# Patient Record
Sex: FEMALE | Race: WHITE | Employment: OTHER | URBAN - METROPOLITAN AREA
[De-identification: names, ages, dates, MRNs, and addresses within clinical notes are randomized per-mention and may not be internally consistent; named-entity substitution may affect disease eponyms.]

---

## 2018-06-15 LAB — DIABETIC RETINOPATHY: NEGATIVE

## 2020-04-05 ENCOUNTER — APPOINTMENT (OUTPATIENT)
Dept: CT IMAGING | Age: 75
End: 2020-04-05
Payer: MEDICARE

## 2020-04-05 ENCOUNTER — HOSPITAL ENCOUNTER (EMERGENCY)
Age: 75
Discharge: HOME | End: 2020-04-05
Attending: EMERGENCY MEDICINE
Payer: MEDICARE

## 2020-04-05 ENCOUNTER — APPOINTMENT (OUTPATIENT)
Dept: GENERAL RADIOLOGY | Age: 75
End: 2020-04-05
Payer: MEDICARE

## 2020-04-05 ENCOUNTER — APPOINTMENT (OUTPATIENT)
Dept: ULTRASOUND IMAGING | Age: 75
End: 2020-04-05
Payer: MEDICARE

## 2020-04-05 VITALS
HEART RATE: 78 BPM | TEMPERATURE: 97.9 F | BODY MASS INDEX: 27.6 KG/M2 | DIASTOLIC BLOOD PRESSURE: 75 MMHG | HEIGHT: 62 IN | OXYGEN SATURATION: 96 % | WEIGHT: 150 LBS | SYSTOLIC BLOOD PRESSURE: 189 MMHG | RESPIRATION RATE: 16 BRPM

## 2020-04-05 LAB
ALBUMIN SERPL-MCNC: 4.2 G/DL (ref 3.5–5.2)
ALP BLD-CCNC: 110 U/L (ref 35–104)
ALT SERPL-CCNC: 16 U/L (ref 0–32)
ANION GAP SERPL CALCULATED.3IONS-SCNC: 14 MMOL/L (ref 7–16)
AST SERPL-CCNC: 52 U/L (ref 0–31)
BASOPHILS ABSOLUTE: 0.07 E9/L (ref 0–0.2)
BASOPHILS RELATIVE PERCENT: 0.6 % (ref 0–2)
BILIRUB SERPL-MCNC: 0.6 MG/DL (ref 0–1.2)
BUN BLDV-MCNC: 17 MG/DL (ref 8–23)
CALCIUM SERPL-MCNC: 9.3 MG/DL (ref 8.6–10.2)
CHLORIDE BLD-SCNC: 99 MMOL/L (ref 98–107)
CO2: 24 MMOL/L (ref 22–29)
CREAT SERPL-MCNC: 0.6 MG/DL (ref 0.5–1)
EOSINOPHILS ABSOLUTE: 0.23 E9/L (ref 0.05–0.5)
EOSINOPHILS RELATIVE PERCENT: 2 % (ref 0–6)
GFR AFRICAN AMERICAN: >60
GFR NON-AFRICAN AMERICAN: >60 ML/MIN/1.73
GLUCOSE BLD-MCNC: 232 MG/DL (ref 74–99)
HCT VFR BLD CALC: 36.6 % (ref 34–48)
HEMOGLOBIN: 11.7 G/DL (ref 11.5–15.5)
IMMATURE GRANULOCYTES #: 0.11 E9/L
IMMATURE GRANULOCYTES %: 1 % (ref 0–5)
LIPASE: 24 U/L (ref 13–60)
LYMPHOCYTES ABSOLUTE: 0.9 E9/L (ref 1.5–4)
LYMPHOCYTES RELATIVE PERCENT: 7.8 % (ref 20–42)
MCH RBC QN AUTO: 37 PG (ref 26–35)
MCHC RBC AUTO-ENTMCNC: 32 % (ref 32–34.5)
MCV RBC AUTO: 115.8 FL (ref 80–99.9)
MONOCYTES ABSOLUTE: 0.57 E9/L (ref 0.1–0.95)
MONOCYTES RELATIVE PERCENT: 5 % (ref 2–12)
NEUTROPHILS ABSOLUTE: 9.6 E9/L (ref 1.8–7.3)
NEUTROPHILS RELATIVE PERCENT: 83.6 % (ref 43–80)
PDW BLD-RTO: 13.8 FL (ref 11.5–15)
PLATELET # BLD: 447 E9/L (ref 130–450)
PMV BLD AUTO: 10.1 FL (ref 7–12)
POTASSIUM REFLEX MAGNESIUM: 5.4 MMOL/L (ref 3.5–5)
RBC # BLD: 3.16 E12/L (ref 3.5–5.5)
SODIUM BLD-SCNC: 137 MMOL/L (ref 132–146)
TOTAL PROTEIN: 7.2 G/DL (ref 6.4–8.3)
TROPONIN: <0.01 NG/ML (ref 0–0.03)
WBC # BLD: 11.5 E9/L (ref 4.5–11.5)

## 2020-04-05 PROCEDURE — 99284 EMERGENCY DEPT VISIT MOD MDM: CPT

## 2020-04-05 PROCEDURE — 85025 COMPLETE CBC W/AUTO DIFF WBC: CPT

## 2020-04-05 PROCEDURE — 83690 ASSAY OF LIPASE: CPT

## 2020-04-05 PROCEDURE — 72131 CT LUMBAR SPINE W/O DYE: CPT

## 2020-04-05 PROCEDURE — 84484 ASSAY OF TROPONIN QUANT: CPT

## 2020-04-05 PROCEDURE — 93005 ELECTROCARDIOGRAM TRACING: CPT | Performed by: EMERGENCY MEDICINE

## 2020-04-05 PROCEDURE — 80053 COMPREHEN METABOLIC PANEL: CPT

## 2020-04-05 PROCEDURE — 71045 X-RAY EXAM CHEST 1 VIEW: CPT

## 2020-04-05 PROCEDURE — 93971 EXTREMITY STUDY: CPT

## 2020-04-05 PROCEDURE — 96372 THER/PROPH/DIAG INJ SC/IM: CPT

## 2020-04-05 PROCEDURE — 6360000002 HC RX W HCPCS: Performed by: EMERGENCY MEDICINE

## 2020-04-05 PROCEDURE — 6370000000 HC RX 637 (ALT 250 FOR IP): Performed by: EMERGENCY MEDICINE

## 2020-04-05 PROCEDURE — 96376 TX/PRO/DX INJ SAME DRUG ADON: CPT

## 2020-04-05 PROCEDURE — 96374 THER/PROPH/DIAG INJ IV PUSH: CPT

## 2020-04-05 RX ORDER — ONDANSETRON 2 MG/ML
4 INJECTION INTRAMUSCULAR; INTRAVENOUS ONCE
Status: COMPLETED | OUTPATIENT
Start: 2020-04-05 | End: 2020-04-05

## 2020-04-05 RX ORDER — HYDROCODONE BITARTRATE AND ACETAMINOPHEN 7.5; 325 MG/1; MG/1
1 TABLET ORAL EVERY 6 HOURS PRN
COMMUNITY
End: 2020-07-27 | Stop reason: SDUPTHER

## 2020-04-05 RX ORDER — OXYCODONE HYDROCHLORIDE AND ACETAMINOPHEN 5; 325 MG/1; MG/1
1 TABLET ORAL ONCE
Status: COMPLETED | OUTPATIENT
Start: 2020-04-05 | End: 2020-04-05

## 2020-04-05 RX ORDER — KETOROLAC TROMETHAMINE 30 MG/ML
30 INJECTION, SOLUTION INTRAMUSCULAR; INTRAVENOUS ONCE
Status: COMPLETED | OUTPATIENT
Start: 2020-04-05 | End: 2020-04-05

## 2020-04-05 RX ADMIN — ONDANSETRON 4 MG: 2 INJECTION INTRAMUSCULAR; INTRAVENOUS at 16:20

## 2020-04-05 RX ADMIN — KETOROLAC TROMETHAMINE 30 MG: 30 INJECTION, SOLUTION INTRAMUSCULAR at 15:48

## 2020-04-05 RX ADMIN — OXYCODONE HYDROCHLORIDE AND ACETAMINOPHEN 1 TABLET: 5; 325 TABLET ORAL at 15:48

## 2020-04-05 RX ADMIN — ONDANSETRON 4 MG: 2 INJECTION INTRAMUSCULAR; INTRAVENOUS at 17:41

## 2020-04-05 ASSESSMENT — ENCOUNTER SYMPTOMS
VOMITING: 0
BACK PAIN: 1
BOWEL INCONTINENCE: 0
DIARRHEA: 0
SINUS PAIN: 0
PHOTOPHOBIA: 0
ABDOMINAL PAIN: 0
CHEST TIGHTNESS: 0
SHORTNESS OF BREATH: 0
NAUSEA: 0
EYE REDNESS: 0
SINUS PRESSURE: 0
COLOR CHANGE: 0

## 2020-04-05 ASSESSMENT — PAIN SCALES - GENERAL
PAINLEVEL_OUTOF10: 10
PAINLEVEL_OUTOF10: 10

## 2020-04-05 ASSESSMENT — PAIN DESCRIPTION - LOCATION: LOCATION: BACK

## 2020-04-05 ASSESSMENT — PAIN DESCRIPTION - PAIN TYPE: TYPE: CHRONIC PAIN

## 2020-04-05 ASSESSMENT — PAIN DESCRIPTION - FREQUENCY: FREQUENCY: CONTINUOUS

## 2020-04-05 ASSESSMENT — PAIN DESCRIPTION - PROGRESSION: CLINICAL_PROGRESSION: NOT CHANGED

## 2020-04-05 ASSESSMENT — PAIN DESCRIPTION - DESCRIPTORS: DESCRIPTORS: SHARP;SHOOTING

## 2020-04-05 NOTE — ED PROVIDER NOTES
Cardiovascular: Negative for chest pain, palpitations and leg swelling. Gastrointestinal: Negative for abdominal pain, bowel incontinence, diarrhea, nausea and vomiting. Genitourinary: Negative for bladder incontinence, decreased urine volume, difficulty urinating, dysuria, flank pain, frequency and hematuria. Musculoskeletal: Positive for back pain. Negative for arthralgias, gait problem, myalgias, neck pain and neck stiffness. Skin: Negative for color change, rash and wound. Allergic/Immunologic: Negative for immunocompromised state. Neurological: Negative for dizziness, tingling, syncope, speech difficulty, weakness, light-headedness, numbness, headaches and paresthesias. Hematological: Negative for adenopathy. Does not bruise/bleed easily. Physical Exam  Vitals signs and nursing note reviewed. Constitutional:       General: She is in acute distress. Appearance: She is obese. She is diaphoretic. HENT:      Head: Normocephalic and atraumatic. Nose: Nose normal. No congestion or rhinorrhea. Mouth/Throat:      Mouth: Mucous membranes are moist.      Pharynx: Oropharynx is clear. No oropharyngeal exudate or posterior oropharyngeal erythema. Eyes:      Conjunctiva/sclera: Conjunctivae normal.      Pupils: Pupils are equal, round, and reactive to light. Neck:      Musculoskeletal: Normal range of motion and neck supple. Cardiovascular:      Rate and Rhythm: Normal rate and regular rhythm. Pulses: Normal pulses. Heart sounds: Normal heart sounds. No murmur. No friction rub. No gallop. Pulmonary:      Effort: Pulmonary effort is normal. No respiratory distress. Breath sounds: Normal breath sounds. No stridor. No wheezing, rhonchi or rales. Abdominal:      General: Abdomen is flat. Bowel sounds are normal. There is no distension. Palpations: Abdomen is soft. There is no mass. Tenderness: There is no abdominal tenderness.       Hernia: No hernia is present. Comments: Ostomy Left lower abdomen   Musculoskeletal:         General: No swelling, tenderness, deformity or signs of injury. Right lower leg: No edema. Left lower leg: No edema. Comments: Compartments are soft and compressible, pulses are was in dorsal pedal and posterior tibial bilaterally; sensation intact in all dermatomal distributions; negative straight leg raise bilaterally   Skin:     General: Skin is warm. Capillary Refill: Capillary refill takes less than 2 seconds. Coloration: Skin is not jaundiced. Findings: No bruising, erythema or rash. Neurological:      Mental Status: She is alert and oriented to person, place, and time. Procedures     MDM     ED Course as of Apr 05 1832   Sun Apr 05, 2020   1738 Patient is in no distress. She states that she feels \"no better\". Work-up is negative for signs of DVT, lumbar fractures or dislocations. She does have osteoarthritis which is not surprising given her age. She remains neurovascularly intact. She reports that she does not believe that she will be able to stand or to walk and she lives alone at home. I discussed with her that we will attempt to stand and ambulate her here and if she is unable to do so she would likely require admission with likely subsequent acute rehab placement. Patient had some hesitation at this idea because she \"does not think it will help\". Will assess ability to stand and walk. She is requesting additional antiemetic medication. Given her reported new onset nausea as well as her initial stated presentation with him diaphoretic with increased pain, consideration to withdrawal from her chronic opioid use at home is a consideration. [JL]   1756 Pt ambulating without difficulty around ED, pt has known chronic pain was folowing w pain management but stoped. Pt will be discharged she is to call pcp tomorrow to d/w adjusting her medications.  We will rx non-noncarotic of lumbar intervertebral disc        Disposition:  Patient's disposition: Discharge to home  Patient's condition is stable.        Sebastian Ortega DO  Resident  04/05/20 0304

## 2020-04-06 LAB
EKG ATRIAL RATE: 70 BPM
EKG P AXIS: 63 DEGREES
EKG P-R INTERVAL: 186 MS
EKG Q-T INTERVAL: 410 MS
EKG QRS DURATION: 82 MS
EKG QTC CALCULATION (BAZETT): 442 MS
EKG R AXIS: -2 DEGREES
EKG T AXIS: 18 DEGREES
EKG VENTRICULAR RATE: 70 BPM

## 2020-04-06 PROCEDURE — 93010 ELECTROCARDIOGRAM REPORT: CPT | Performed by: INTERNAL MEDICINE

## 2020-04-08 ENCOUNTER — TELEPHONE (OUTPATIENT)
Dept: PRIMARY CARE CLINIC | Age: 75
End: 2020-04-08

## 2020-04-08 NOTE — TELEPHONE ENCOUNTER
[facility] ED Follow Up Call    Attempted to call that patient for ED follow up. Currently the patient is not accepting calls at the listed phone number.

## 2020-05-30 LAB
ALBUMIN SERPL-MCNC: NORMAL G/DL
ALP BLD-CCNC: NORMAL U/L
ALT SERPL-CCNC: NORMAL U/L
ANION GAP SERPL CALCULATED.3IONS-SCNC: NORMAL MMOL/L
AST SERPL-CCNC: NORMAL U/L
AVERAGE GLUCOSE: NORMAL
BASOPHILS ABSOLUTE: NORMAL
BASOPHILS RELATIVE PERCENT: NORMAL
BILIRUB SERPL-MCNC: NORMAL MG/DL
BUN BLDV-MCNC: NORMAL MG/DL
CALCIUM SERPL-MCNC: NORMAL MG/DL
CHLORIDE BLD-SCNC: NORMAL MMOL/L
CHOLESTEROL, TOTAL: NORMAL
CHOLESTEROL/HDL RATIO: NORMAL
CO2: NORMAL
CREAT SERPL-MCNC: NORMAL MG/DL
EOSINOPHILS ABSOLUTE: NORMAL
EOSINOPHILS RELATIVE PERCENT: NORMAL
GFR CALCULATED: NORMAL
GLUCOSE BLD-MCNC: NORMAL MG/DL
HBA1C MFR BLD: 5.7 %
HCT VFR BLD CALC: NORMAL %
HDLC SERPL-MCNC: NORMAL MG/DL
HEMOGLOBIN: NORMAL
LDL CHOLESTEROL CALCULATED: NORMAL
LYMPHOCYTES ABSOLUTE: NORMAL
LYMPHOCYTES RELATIVE PERCENT: NORMAL
MCH RBC QN AUTO: NORMAL PG
MCHC RBC AUTO-ENTMCNC: NORMAL G/DL
MCV RBC AUTO: NORMAL FL
MONOCYTES ABSOLUTE: NORMAL
MONOCYTES RELATIVE PERCENT: NORMAL
NEUTROPHILS ABSOLUTE: NORMAL
NEUTROPHILS RELATIVE PERCENT: NORMAL
PLATELET # BLD: NORMAL 10*3/UL
PMV BLD AUTO: NORMAL FL
POTASSIUM SERPL-SCNC: NORMAL MMOL/L
RBC # BLD: NORMAL 10*6/UL
SODIUM BLD-SCNC: NORMAL MMOL/L
TOTAL PROTEIN: NORMAL
TRIGL SERPL-MCNC: NORMAL MG/DL
VLDLC SERPL CALC-MCNC: NORMAL MG/DL
WBC # BLD: NORMAL 10*3/UL

## 2020-07-23 ENCOUNTER — TELEPHONE (OUTPATIENT)
Dept: FAMILY MEDICINE CLINIC | Age: 75
End: 2020-07-23

## 2020-07-24 RX ORDER — ZOLPIDEM TARTRATE 5 MG/1
5 TABLET ORAL NIGHTLY PRN
Qty: 30 TABLET | Refills: 0 | Status: SHIPPED | OUTPATIENT
Start: 2020-07-24 | End: 2020-08-23

## 2020-07-27 RX ORDER — ZOLPIDEM TARTRATE 5 MG/1
5 TABLET ORAL NIGHTLY PRN
Qty: 30 TABLET | Refills: 0 | OUTPATIENT
Start: 2020-07-27 | End: 2020-08-26

## 2020-07-27 RX ORDER — HYDROCODONE BITARTRATE AND ACETAMINOPHEN 7.5; 325 MG/1; MG/1
1 TABLET ORAL EVERY 6 HOURS PRN
Qty: 120 TABLET | Refills: 0 | Status: SHIPPED
Start: 2020-07-27 | End: 2020-08-24 | Stop reason: SDUPTHER

## 2020-07-27 RX ORDER — CITALOPRAM 20 MG/1
20 TABLET ORAL DAILY
COMMUNITY
End: 2020-10-30 | Stop reason: SDUPTHER

## 2020-07-27 RX ORDER — CYCLOBENZAPRINE HCL 10 MG
10 TABLET ORAL 2 TIMES DAILY PRN
COMMUNITY
End: 2022-08-21

## 2020-07-27 RX ORDER — LORAZEPAM 0.5 MG/1
0.5 TABLET ORAL DAILY PRN
COMMUNITY
End: 2020-09-09 | Stop reason: SDUPTHER

## 2020-07-27 RX ORDER — ZOLPIDEM TARTRATE 5 MG/1
1 TABLET ORAL NIGHTLY
COMMUNITY
Start: 2020-03-09 | End: 2020-08-24 | Stop reason: SDUPTHER

## 2020-07-27 RX ORDER — LORATADINE 10 MG/1
10 TABLET ORAL DAILY
COMMUNITY
End: 2022-09-02

## 2020-07-27 RX ORDER — TRIAMCINOLONE ACETONIDE 55 UG/1
2 SPRAY, METERED NASAL DAILY
COMMUNITY
End: 2021-05-17 | Stop reason: SDUPTHER

## 2020-08-24 RX ORDER — ZOLPIDEM TARTRATE 5 MG/1
5 TABLET ORAL NIGHTLY
Qty: 30 TABLET | Refills: 2 | Status: SHIPPED | OUTPATIENT
Start: 2020-08-24 | End: 2020-09-23

## 2020-08-24 RX ORDER — HYDROCODONE BITARTRATE AND ACETAMINOPHEN 7.5; 325 MG/1; MG/1
1 TABLET ORAL EVERY 6 HOURS PRN
Qty: 120 TABLET | Refills: 0 | Status: SHIPPED
Start: 2020-08-24 | End: 2020-09-18 | Stop reason: SDUPTHER

## 2020-08-24 RX ORDER — FOLIC ACID 1 MG/1
1 TABLET ORAL DAILY
Qty: 30 TABLET | Refills: 5 | Status: SHIPPED
Start: 2020-08-24 | End: 2021-06-04 | Stop reason: SDUPTHER

## 2020-09-11 RX ORDER — LORAZEPAM 0.5 MG/1
0.5 TABLET ORAL DAILY PRN
Qty: 30 TABLET | Refills: 2 | Status: SHIPPED
Start: 2020-09-11 | End: 2020-09-14 | Stop reason: SDUPTHER

## 2020-09-11 RX ORDER — FUROSEMIDE 20 MG/1
20 TABLET ORAL DAILY
Qty: 90 TABLET | Refills: 1 | Status: SHIPPED
Start: 2020-09-11 | End: 2020-10-02 | Stop reason: SDUPTHER

## 2020-09-11 RX ORDER — OMEPRAZOLE 40 MG/1
40 CAPSULE, DELAYED RELEASE ORAL DAILY
Qty: 90 CAPSULE | Refills: 1 | Status: SHIPPED
Start: 2020-09-11 | End: 2021-03-29 | Stop reason: SDUPTHER

## 2020-09-14 RX ORDER — LORAZEPAM 0.5 MG/1
0.5 TABLET ORAL DAILY PRN
Qty: 30 TABLET | Refills: 2 | Status: SHIPPED | OUTPATIENT
Start: 2020-09-14 | End: 2020-10-14

## 2020-09-17 ENCOUNTER — TELEPHONE (OUTPATIENT)
Dept: FAMILY MEDICINE CLINIC | Age: 75
End: 2020-09-17

## 2020-09-17 NOTE — TELEPHONE ENCOUNTER
Pt said she asked for an rx over a week ago, is still not at her pharmacy,  Dr. Darrell Alva did print out rx for pt., she was not aware she had to pick it up. She said we have always phoned it in. Rx phoned to [facility] this time since pt. Said she was not aware, in the future she is to pick it up.

## 2020-09-18 RX ORDER — HYDROCODONE BITARTRATE AND ACETAMINOPHEN 7.5; 325 MG/1; MG/1
1 TABLET ORAL EVERY 6 HOURS PRN
Qty: 120 TABLET | Refills: 0 | Status: SHIPPED | OUTPATIENT
Start: 2020-09-18 | End: 2020-10-18

## 2020-10-02 ENCOUNTER — OFFICE VISIT (OUTPATIENT)
Dept: FAMILY MEDICINE CLINIC | Age: 75
End: 2020-10-02
Payer: MEDICARE

## 2020-10-02 VITALS
TEMPERATURE: 97.5 F | HEIGHT: 62 IN | HEART RATE: 72 BPM | OXYGEN SATURATION: 97 % | WEIGHT: 155 LBS | BODY MASS INDEX: 28.52 KG/M2 | SYSTOLIC BLOOD PRESSURE: 138 MMHG | DIASTOLIC BLOOD PRESSURE: 78 MMHG

## 2020-10-02 PROCEDURE — 99214 OFFICE O/P EST MOD 30 MIN: CPT | Performed by: FAMILY MEDICINE

## 2020-10-02 RX ORDER — METOPROLOL SUCCINATE 50 MG/1
50 TABLET, EXTENDED RELEASE ORAL DAILY
COMMUNITY
End: 2020-10-05 | Stop reason: CLARIF

## 2020-10-02 RX ORDER — FUROSEMIDE 20 MG/1
20 TABLET ORAL DAILY
Qty: 30 TABLET | Refills: 5 | Status: SHIPPED
Start: 2020-10-02 | End: 2021-05-25 | Stop reason: SDUPTHER

## 2020-10-02 RX ORDER — SULFAMETHOXAZOLE AND TRIMETHOPRIM 800; 160 MG/1; MG/1
1 TABLET ORAL 2 TIMES DAILY
Qty: 20 TABLET | Refills: 0 | Status: SHIPPED | OUTPATIENT
Start: 2020-10-02 | End: 2020-10-12

## 2020-10-02 ASSESSMENT — PATIENT HEALTH QUESTIONNAIRE - PHQ9
1. LITTLE INTEREST OR PLEASURE IN DOING THINGS: 0
SUM OF ALL RESPONSES TO PHQ9 QUESTIONS 1 & 2: 0
SUM OF ALL RESPONSES TO PHQ QUESTIONS 1-9: 0
2. FEELING DOWN, DEPRESSED OR HOPELESS: 0
SUM OF ALL RESPONSES TO PHQ QUESTIONS 1-9: 0

## 2020-10-02 ASSESSMENT — ENCOUNTER SYMPTOMS
ABDOMINAL PAIN: 1
DIARRHEA: 1

## 2020-10-02 NOTE — PROGRESS NOTES
10/2/20  Darlyn Wong : 1945 Sex: female  Age: 66 y.o. No chief complaint on file. Overall check up   Herniated stoma problems           Current Outpatient Medications:     metoprolol succinate (TOPROL XL) 50 MG extended release tablet, Take 50 mg by mouth daily, Disp: , Rfl:     furosemide (LASIX) 20 MG tablet, Take 1 tablet by mouth daily, Disp: 30 tablet, Rfl: 5    sulfamethoxazole-trimethoprim (BACTRIM DS;SEPTRA DS) 800-160 MG per tablet, Take 1 tablet by mouth 2 times daily for 10 days, Disp: 20 tablet, Rfl: 0    HYDROcodone-acetaminophen (NORCO) 7.5-325 MG per tablet, Take 1 tablet by mouth every 6 hours as needed for Pain for up to 30 days. , Disp: 120 tablet, Rfl: 0    LORazepam (ATIVAN) 0.5 MG tablet, Take 1 tablet by mouth daily as needed for Anxiety for up to 30 days. , Disp: 30 tablet, Rfl: 2    omeprazole (PRILOSEC) 40 MG delayed release capsule, Take 1 capsule by mouth daily, Disp: 90 capsule, Rfl: 1    folic acid (FOLVITE) 1 MG tablet, Take 1 tablet by mouth daily, Disp: 30 tablet, Rfl: 5    citalopram (CELEXA) 20 MG tablet, Take 20 mg by mouth daily, Disp: , Rfl:     diclofenac sodium (VOLTAREN) 1 % GEL, Apply 2 g topically 2 times daily, Disp: , Rfl:     loratadine (CLARITIN) 10 MG tablet, Take 10 mg by mouth daily prn, Disp: , Rfl:     triamcinolone (NASACORT ALLERGY 24HR) 55 MCG/ACT nasal inhaler, 2 sprays by Each Nostril route daily Prn, Disp: , Rfl:     Cholecalciferol (VITAMIN D3 PO), Take by mouth daily, Disp: , Rfl:     cyclobenzaprine (FLEXERIL) 10 MG tablet, Take 10 mg by mouth 2 times daily as needed, Disp: , Rfl:     aspirin 325 MG EC tablet, Take 325 mg by mouth daily, Disp: , Rfl:     Multiple Vitamins-Minerals (THERAPEUTIC MULTIVITAMIN-MINERALS) tablet, Take 1 tablet by mouth daily, Disp: , Rfl:     hydroxyurea (HYDREA) 500 MG chemo capsule, Take  by mouth daily. , Disp: , Rfl:     simvastatin (ZOCOR) 40 MG tablet, Take 40 mg by mouth nightly., Disp: , Rfl: Allergies   Allergen Reactions    Cataflam [Diclofenac]     Celebrex [Celecoxib]     Lamisil [Terbinafine] Swelling    Levaquin [Levofloxacin]     Lipitor [Atorvastatin Calcium]     Oxycodone        Review of Systems   Constitutional: Negative for appetite change, chills, diaphoresis, fatigue, fever and unexpected weight change. HENT: Negative for congestion, dental problem, ear discharge, ear pain, facial swelling, hearing loss, mouth sores, rhinorrhea, sinus pain, sore throat, tinnitus and voice change. Eyes: Negative for photophobia and visual disturbance (no visual changes). Respiratory: Negative for apnea, cough, chest tightness, shortness of breath and wheezing. Cardiovascular: Negative for chest pain and palpitations. Gastrointestinal: Positive for abdominal pain (stoma swelling and chrons type situation ) and diarrhea. Negative for abdominal distention, constipation, nausea, rectal pain and vomiting. Endocrine: Negative for cold intolerance, heat intolerance and polyuria. Genitourinary: Positive for dysuria, frequency and urgency. Negative for hematuria. Musculoskeletal: Negative for arthralgias, back pain, gait problem, joint swelling, myalgias, neck pain and neck stiffness. Skin: Negative for rash and wound. Allergic/Immunologic: Negative for environmental allergies. Neurological: Positive for weakness. Negative for dizziness, tremors, seizures, syncope, facial asymmetry, speech difficulty, light-headedness, numbness and headaches. Hematological: Does not bruise/bleed easily. Psychiatric/Behavioral: Negative for agitation, hallucinations and suicidal ideas. The patient is not nervous/anxious.         Past Medical History:   Diagnosis Date    Arthritis     Cro[facility] disease ([facility]     Diabetes mellitus ([facility]     Hyperlipidemia     Hypertension     Insomnia     [facility]od[facility]plastic disease ([facility]     Ulcerative colitis with intestinal obstruction ([facility]     Unspecified diseases of blood and blood-forming organs      Past Surgical History:   Procedure Laterality Date    ADENOIDECTOMY      CARPAL TUNNEL RELEASE Right     CERVICAL FUSION      COLON SURGERY      COLOSTOMY      EYE SURGERY Bilateral     CATARACT    HERNIA REPAIR      JOINT REPLACEMENT      rt and lt    JOINT REPLACEMENT Bilateral     TONSILLECTOMY      TUBAL LIGATION       Family History   Problem Relation Age of Onset    Breast Cancer Mother     Diabetes Father     Heart Failure Sister     Diabetes Sister      Social History     Socioeconomic History    Marital status:       Spouse name: Not on file    Number of children: Not on file    Years of education: Not on file    Highest education level: Not on file   Occupational History    Not on file   Social Needs    Financial resource strain: Not on file    Food insecurity     Worry: Not on file     Inability: Not on file    Transportation needs     Medical: Not on file     Non-medical: Not on file   Tobacco Use    Smoking status: Never Smoker    Smokeless tobacco: Never Used   Substance and Sexual Activity    Alcohol use: No    Drug use: No    Sexual activity: Not on file   Lifestyle    Physical activity     Days per week: Not on file     Minutes per session: Not on file    Stress: Not on file   Relationships    Social connections     Talks on phone: Not on file     Gets together: Not on file     Attends Oriental orthodox service: Not on file     Active member of club or organization: Not on file     Attends meetings of clubs or organizations: Not on file     Relationship status: Not on file    Intimate partner violence     Fear of current or ex partner: Not on file     Emotionally abused: Not on file     Physically abused: Not on file     Forced sexual activity: Not on file   Other Topics Concern    Not on file   Social History Narrative    Not on file       Patient Active Problem List   Diagnosis    [facility]    Acute blood loss anemia    Anorectal pain    Anal stricture    Attention to ileostomy ([address]    CAD (coronary artery disease), native coronary artery    Chronic back pain    Crohn's disease without complication ([address]    Demand ischemia ([address]    Dyslipidemia    Fatigue associated with anemia    History of anticoagulant therapy    History of Nikolai's disease    History of total right hip replacement    History of ulcerative colitis    Hypertension    Myeloproliferative disorder ([address]    Other hyperlipidemia    Pain of right fibula    Post-operative state    Pressure ulcer of buttock    Severe protein-calorie malnutrition (HCC)    Type 2 diabetes mellitus with diabetic nephropathy (HCC)    UTI (urinary tract infection)        Vitals:    10/02/20 1456 10/02/20 1506   BP: (!) 150/86 138/78   Pulse: 72    Temp: 97.5 °F (36.4 °C)    TempSrc: Temporal    SpO2: 97%    Weight: 155 lb (70.3 kg)    Height: 5' 2\" (1.575 m)        Physical Exam  Vitals signs reviewed. Constitutional:       General: She is not in acute distress. Appearance: She is ill-appearing. HENT:      Head: Normocephalic. Right Ear: Tympanic membrane, ear canal and external ear normal. There is no impacted cerumen. Left Ear: Tympanic membrane, ear canal and external ear normal. There is no impacted cerumen. Nose: No rhinorrhea. Mouth/Throat:      Pharynx: No posterior oropharyngeal erythema. Eyes:      General:         Right eye: No discharge. Left eye: No discharge. Extraocular Movements: Extraocular movements intact. Pupils: Pupils are equal, round, and reactive to light. Neck:      Musculoskeletal: Neck supple. Vascular: No carotid bruit. Cardiovascular:      Rate and Rhythm: Regular rhythm. Heart sounds: Normal heart sounds. No murmur. No gallop. Pulmonary:      Breath sounds: Rhonchi present. No wheezing or rales.    Abdominal:      General: There is distension (stoma abdomen). Palpations: Abdomen is soft. There is no mass. Tenderness: There is no abdominal tenderness. There is no guarding or rebound. Hernia: A hernia (of stoma ) is present. Musculoskeletal: Normal range of motion. Lymphadenopathy:      Cervical: No cervical adenopathy. Skin:     General: Skin is warm and dry. Findings: No bruising or rash. Neurological:      General: No focal deficit present. Mental Status: She is alert and oriented to person, place, and time. Cranial Nerves: No cranial nerve deficit. Motor: No weakness. Coordination: Coordination normal.      Gait: Gait normal.   Psychiatric:         Mood and Affect: Mood normal.         Behavior: Behavior normal.         Assessment and Plan:  Diagnoses and all orders for this visit:    Secondary hypertension    Attention to ileostomy [facility])    Coronary artery disease involving native coronary artery with angina pectoris, unspecified whether native or transplanted heart ([facility]    Crohn's disease without complication, unspecified gastrointestinal tract location [facility])    Type 2 diabetes mellitus with diabetic nephropathy, with long-term current use of insulin ([facility]    History of total right hip replacement    History of ulcerative colitis    Myeloproliferative disorder ([facility]    Other hyperlipidemia    Other orders  -     furosemide (LASIX) 20 MG tablet; Take 1 tablet by mouth daily  -     sulfamethoxazole-trimethoprim (BACTRIM DS;SEPTRA DS) 800-160 MG per tablet; Take 1 tablet by mouth 2 times daily for 10 days          Discussions/Education provided to patients during visit:  [] Discussed the importance to stop smoking. [x] Advised to monitor eating habits. [] Reviewed and discussed Imaging results. [x] Reviewed and discussed Lab results. [x] Discussed the importance of drinking plenty of fluids. [x] Cut down on Salt, Caffeine, and Sugar. [x] Continue Medications as Discussed.   [x] Communicated with patient any

## 2020-10-04 PROBLEM — K50.90 CROHN'S DISEASE WITHOUT COMPLICATION (HCC): Status: ACTIVE | Noted: 2020-10-04

## 2020-10-04 PROBLEM — M89.8X6 PAIN OF RIGHT FIBULA: Status: ACTIVE | Noted: 2020-10-04

## 2020-10-04 PROBLEM — Z92.29 HISTORY OF ANTICOAGULANT THERAPY: Status: ACTIVE | Noted: 2020-10-04

## 2020-10-04 PROBLEM — Z96.641 HISTORY OF TOTAL RIGHT HIP REPLACEMENT: Status: ACTIVE | Noted: 2020-10-04

## 2020-10-04 PROBLEM — Z87.39 HISTORY OF REITER'S DISEASE: Status: ACTIVE | Noted: 2020-10-04

## 2020-10-04 PROBLEM — G89.29 CHRONIC BACK PAIN: Status: ACTIVE | Noted: 2020-10-04

## 2020-10-04 PROBLEM — E78.49 OTHER HYPERLIPIDEMIA: Status: ACTIVE | Noted: 2020-10-04

## 2020-10-04 PROBLEM — M54.9 CHRONIC BACK PAIN: Status: ACTIVE | Noted: 2020-10-04

## 2020-10-04 PROBLEM — Z87.19 HISTORY OF ULCERATIVE COLITIS: Status: ACTIVE | Noted: 2020-10-04

## 2020-10-04 PROBLEM — D47.1 MYELOPROLIFERATIVE DISORDER (HCC): Status: ACTIVE | Noted: 2020-10-04

## 2020-10-04 PROBLEM — D64.9 FATIGUE ASSOCIATED WITH ANEMIA: Status: ACTIVE | Noted: 2020-10-04

## 2020-10-04 ASSESSMENT — ENCOUNTER SYMPTOMS
PHOTOPHOBIA: 0
COUGH: 0
SORE THROAT: 0
FACIAL SWELLING: 0
NAUSEA: 0
CHEST TIGHTNESS: 0
RHINORRHEA: 0
CONSTIPATION: 0
VOMITING: 0
ABDOMINAL DISTENTION: 0
SHORTNESS OF BREATH: 0
SINUS PAIN: 0
VOICE CHANGE: 0
WHEEZING: 0
BACK PAIN: 0
APNEA: 0
RECTAL PAIN: 0

## 2020-10-05 RX ORDER — METOPROLOL TARTRATE 50 MG/1
50 TABLET, FILM COATED ORAL DAILY
COMMUNITY
End: 2020-10-05 | Stop reason: SDUPTHER

## 2020-10-05 RX ORDER — METOPROLOL TARTRATE 50 MG/1
50 TABLET, FILM COATED ORAL DAILY
Qty: 90 TABLET | Refills: 1 | Status: SHIPPED
Start: 2020-10-05 | End: 2021-04-09 | Stop reason: SDUPTHER

## 2020-10-16 RX ORDER — SULFAMETHOXAZOLE AND TRIMETHOPRIM 800; 160 MG/1; MG/1
1 TABLET ORAL 2 TIMES DAILY
Qty: 20 TABLET | Refills: 0 | Status: SHIPPED | OUTPATIENT
Start: 2020-10-16 | End: 2020-10-26

## 2020-10-19 RX ORDER — HYDROCODONE BITARTRATE AND ACETAMINOPHEN 7.5; 325 MG/1; MG/1
1 TABLET ORAL 4 TIMES DAILY
COMMUNITY
End: 2020-10-19 | Stop reason: SDUPTHER

## 2020-10-19 RX ORDER — HYDROCODONE BITARTRATE AND ACETAMINOPHEN 7.5; 325 MG/1; MG/1
1 TABLET ORAL 4 TIMES DAILY
Qty: 120 TABLET | Refills: 0 | Status: SHIPPED
Start: 2020-10-19 | End: 2020-11-17 | Stop reason: SDUPTHER

## 2020-10-30 RX ORDER — CITALOPRAM 20 MG/1
20 TABLET ORAL DAILY
Qty: 90 TABLET | Refills: 1 | Status: SHIPPED
Start: 2020-10-30 | End: 2021-06-25 | Stop reason: SDUPTHER

## 2020-11-17 RX ORDER — LORAZEPAM 0.5 MG/1
TABLET ORAL
COMMUNITY
Start: 2020-10-20 | End: 2020-12-17 | Stop reason: SDUPTHER

## 2020-11-17 RX ORDER — ZOLPIDEM TARTRATE 5 MG/1
5 TABLET ORAL NIGHTLY PRN
Qty: 30 TABLET | Refills: 2 | Status: SHIPPED | OUTPATIENT
Start: 2020-11-17 | End: 2020-12-01

## 2020-11-17 RX ORDER — HYDROCODONE BITARTRATE AND ACETAMINOPHEN 7.5; 325 MG/1; MG/1
1 TABLET ORAL 4 TIMES DAILY
Qty: 120 TABLET | Refills: 0 | Status: SHIPPED
Start: 2020-11-17 | End: 2020-12-14 | Stop reason: SDUPTHER

## 2020-11-30 RX ORDER — HYDROXYUREA 500 MG/1
500 CAPSULE ORAL DAILY
Qty: 90 CAPSULE | Refills: 1 | Status: SHIPPED
Start: 2020-11-30 | End: 2021-06-24 | Stop reason: SDUPTHER

## 2020-12-14 RX ORDER — HYDROCODONE BITARTRATE AND ACETAMINOPHEN 7.5; 325 MG/1; MG/1
1 TABLET ORAL 4 TIMES DAILY
Qty: 120 TABLET | Refills: 0 | Status: SHIPPED
Start: 2020-12-14 | End: 2021-01-11 | Stop reason: SDUPTHER

## 2020-12-17 RX ORDER — LORAZEPAM 0.5 MG/1
0.5 TABLET ORAL EVERY EVENING
Qty: 30 TABLET | Refills: 2 | Status: SHIPPED | OUTPATIENT
Start: 2020-12-17 | End: 2021-01-16

## 2021-01-11 DIAGNOSIS — G89.29 CHRONIC MIDLINE LOW BACK PAIN WITH LEFT-SIDED SCIATICA: ICD-10-CM

## 2021-01-11 DIAGNOSIS — M54.42 CHRONIC MIDLINE LOW BACK PAIN WITH LEFT-SIDED SCIATICA: ICD-10-CM

## 2021-01-11 RX ORDER — HYDROCODONE BITARTRATE AND ACETAMINOPHEN 7.5; 325 MG/1; MG/1
1 TABLET ORAL 4 TIMES DAILY
Qty: 120 TABLET | Refills: 0 | Status: SHIPPED
Start: 2021-01-11 | End: 2021-02-08 | Stop reason: SDUPTHER

## 2021-02-08 ENCOUNTER — OFFICE VISIT (OUTPATIENT)
Dept: FAMILY MEDICINE CLINIC | Age: 76
End: 2021-02-08
Payer: MEDICARE

## 2021-02-08 VITALS
SYSTOLIC BLOOD PRESSURE: 112 MMHG | DIASTOLIC BLOOD PRESSURE: 58 MMHG | WEIGHT: 158.5 LBS | OXYGEN SATURATION: 96 % | BODY MASS INDEX: 29.17 KG/M2 | HEART RATE: 63 BPM | TEMPERATURE: 97.5 F | HEIGHT: 62 IN

## 2021-02-08 DIAGNOSIS — Z87.19 HISTORY OF ULCERATIVE COLITIS: ICD-10-CM

## 2021-02-08 DIAGNOSIS — R73.01 IFG (IMPAIRED FASTING GLUCOSE): ICD-10-CM

## 2021-02-08 DIAGNOSIS — I15.9 SECONDARY HYPERTENSION: Primary | ICD-10-CM

## 2021-02-08 DIAGNOSIS — K31.9 STOMACH PROBLEMS: ICD-10-CM

## 2021-02-08 DIAGNOSIS — E78.49 OTHER HYPERLIPIDEMIA: ICD-10-CM

## 2021-02-08 DIAGNOSIS — K50.90 CROHN'S DISEASE WITHOUT COMPLICATION, UNSPECIFIED GASTROINTESTINAL TRACT LOCATION (HCC): ICD-10-CM

## 2021-02-08 DIAGNOSIS — G89.29 CHRONIC MIDLINE LOW BACK PAIN WITH LEFT-SIDED SCIATICA: ICD-10-CM

## 2021-02-08 DIAGNOSIS — M54.42 CHRONIC MIDLINE LOW BACK PAIN WITH LEFT-SIDED SCIATICA: ICD-10-CM

## 2021-02-08 PROCEDURE — 1123F ACP DISCUSS/DSCN MKR DOCD: CPT | Performed by: FAMILY MEDICINE

## 2021-02-08 PROCEDURE — 1090F PRES/ABSN URINE INCON ASSESS: CPT | Performed by: FAMILY MEDICINE

## 2021-02-08 PROCEDURE — 1036F TOBACCO NON-USER: CPT | Performed by: FAMILY MEDICINE

## 2021-02-08 PROCEDURE — G8400 PT W/DXA NO RESULTS DOC: HCPCS | Performed by: FAMILY MEDICINE

## 2021-02-08 PROCEDURE — 4040F PNEUMOC VAC/ADMIN/RCVD: CPT | Performed by: FAMILY MEDICINE

## 2021-02-08 PROCEDURE — G8427 DOCREV CUR MEDS BY ELIG CLIN: HCPCS | Performed by: FAMILY MEDICINE

## 2021-02-08 PROCEDURE — G8417 CALC BMI ABV UP PARAM F/U: HCPCS | Performed by: FAMILY MEDICINE

## 2021-02-08 PROCEDURE — 99214 OFFICE O/P EST MOD 30 MIN: CPT | Performed by: FAMILY MEDICINE

## 2021-02-08 PROCEDURE — G8484 FLU IMMUNIZE NO ADMIN: HCPCS | Performed by: FAMILY MEDICINE

## 2021-02-08 RX ORDER — ASPIRIN 81 MG/1
81 TABLET ORAL
Status: ON HOLD | COMMUNITY
End: 2022-09-03 | Stop reason: SDUPTHER

## 2021-02-08 RX ORDER — CYCLOBENZAPRINE HCL 10 MG
10 TABLET ORAL 2 TIMES DAILY PRN
COMMUNITY
End: 2021-02-08

## 2021-02-08 RX ORDER — DICYCLOMINE HCL 20 MG
20 TABLET ORAL 2 TIMES DAILY
Qty: 60 TABLET | Refills: 2 | Status: SHIPPED
Start: 2021-02-08 | End: 2021-09-10 | Stop reason: SDUPTHER

## 2021-02-08 RX ORDER — ZOLPIDEM TARTRATE 5 MG/1
TABLET ORAL
COMMUNITY
Start: 2021-01-21 | End: 2021-02-24 | Stop reason: SDUPTHER

## 2021-02-08 RX ORDER — HYDROCODONE BITARTRATE AND ACETAMINOPHEN 7.5; 325 MG/1; MG/1
1 TABLET ORAL 4 TIMES DAILY
Qty: 120 TABLET | Refills: 0 | Status: SHIPPED
Start: 2021-02-08 | End: 2021-03-08 | Stop reason: SDUPTHER

## 2021-02-08 RX ORDER — LORAZEPAM 0.5 MG/1
TABLET ORAL
COMMUNITY
Start: 2021-01-21 | End: 2021-04-09 | Stop reason: SDUPTHER

## 2021-02-08 RX ORDER — SIMVASTATIN 40 MG
40 TABLET ORAL NIGHTLY
Qty: 90 TABLET | Refills: 1 | Status: SHIPPED
Start: 2021-02-08 | End: 2021-08-18 | Stop reason: SDUPTHER

## 2021-02-08 SDOH — ECONOMIC STABILITY: TRANSPORTATION INSECURITY
IN THE PAST 12 MONTHS, HAS THE LACK OF TRANSPORTATION KEPT YOU FROM MEDICAL APPOINTMENTS OR FROM GETTING MEDICATIONS?: NOT ASKED

## 2021-02-08 SDOH — ECONOMIC STABILITY: FOOD INSECURITY: WITHIN THE PAST 12 MONTHS, THE FOOD YOU BOUGHT JUST DIDN'T LAST AND YOU DIDN'T HAVE MONEY TO GET MORE.: NOT ASKED

## 2021-02-08 ASSESSMENT — PATIENT HEALTH QUESTIONNAIRE - PHQ9
SUM OF ALL RESPONSES TO PHQ QUESTIONS 1-9: 1
2. FEELING DOWN, DEPRESSED OR HOPELESS: 0
SUM OF ALL RESPONSES TO PHQ9 QUESTIONS 1 & 2: 1
1. LITTLE INTEREST OR PLEASURE IN DOING THINGS: 1
SUM OF ALL RESPONSES TO PHQ QUESTIONS 1-9: 1

## 2021-02-08 ASSESSMENT — ENCOUNTER SYMPTOMS
SHORTNESS OF BREATH: 0
NAUSEA: 0
RHINORRHEA: 0
FACIAL SWELLING: 0
BACK PAIN: 0
SORE THROAT: 0
APNEA: 0
DIARRHEA: 0
SINUS PAIN: 0
PHOTOPHOBIA: 0
ABDOMINAL DISTENTION: 1
VOMITING: 0
CONSTIPATION: 0
CHEST TIGHTNESS: 0
WHEEZING: 0
RECTAL PAIN: 0
COUGH: 0
VOICE CHANGE: 0
ABDOMINAL PAIN: 1

## 2021-02-08 NOTE — PROGRESS NOTES
21  [facility] Vanna : 1945 Sex: female  Age: 66 y.o. Chief Complaint   Patient presents with    Abdominal Pain     crohns flare up wants referral to [facility], needs refills, wants flu shot today, wants Covid vax    Other     needs AWV       Overall situation   stoma problems   Bowel problems         Current Outpatient Medications:     aspirin 81 MG EC tablet, Take 81 mg by mouth, Disp: , Rfl:     LORazepam (ATIVAN) 0.5 MG tablet, , Disp: , Rfl:     simvastatin (ZOCOR) 40 MG tablet, Take 1 tablet by mouth nightly, Disp: 90 tablet, Rfl: 1    HYDROcodone-acetaminophen (NORCO) 7.5-325 MG per tablet, Take 1 tablet by mouth 4 times daily for 30 days. , Disp: 120 tablet, Rfl: 0    hydroxyurea (HYDREA) 500 MG chemo capsule, Take 1 capsule by mouth daily, Disp: 90 capsule, Rfl: 1    citalopram (CELEXA) 20 MG tablet, Take 1 tablet by mouth daily, Disp: 90 tablet, Rfl: 1    metoprolol tartrate (LOPRESSOR) 50 MG tablet, Take 1 tablet by mouth daily, Disp: 90 tablet, Rfl: 1    furosemide (LASIX) 20 MG tablet, Take 1 tablet by mouth daily, Disp: 30 tablet, Rfl: 5    omeprazole (PRILOSEC) 40 MG delayed release capsule, Take 1 capsule by mouth daily, Disp: 90 capsule, Rfl: 1    folic acid (FOLVITE) 1 MG tablet, Take 1 tablet by mouth daily, Disp: 30 tablet, Rfl: 5    diclofenac sodium (VOLTAREN) 1 % GEL, Apply 2 g topically 2 times daily, Disp: , Rfl:     loratadine (CLARITIN) 10 MG tablet, Take 10 mg by mouth daily prn, Disp: , Rfl:     triamcinolone (NASACORT ALLERGY 24HR) 55 MCG/ACT nasal inhaler, 2 sprays by Each Nostril route daily Prn, Disp: , Rfl:     Cholecalciferol (VITAMIN D3 PO), Take by mouth daily, Disp: , Rfl:     cyclobenzaprine (FLEXERIL) 10 MG tablet, Take 10 mg by mouth 2 times daily as needed, Disp: , Rfl:     Multiple Vitamins-Minerals (THERAPEUTIC MULTIVITAMIN-MINERALS) tablet, Take 1 tablet by mouth daily, Disp: , Rfl:     zolpidem (AMBIEN) 5 MG tablet, take 1 tablet by mouth at bedtime if needed for sleep, Disp: , Rfl:   Allergies   Allergen Reactions    Cataflam [Diclofenac]     Celebrex [Celecoxib]     Lamisil [Terbinafine] Swelling    Levaquin [Levofloxacin]     Lipitor [Atorvastatin Calcium]     Oxycodone        Review of Systems   Constitutional: Negative for appetite change, chills, diaphoresis, fatigue, fever and unexpected weight change. HENT: Negative for congestion, dental problem, ear discharge, ear pain, facial swelling, hearing loss, mouth sores, rhinorrhea, sinus pain, sore throat, tinnitus and voice change. Eyes: Negative for photophobia and visual disturbance (no visual changes). Respiratory: Negative for apnea, cough, chest tightness, shortness of breath and wheezing. Cardiovascular: Negative for chest pain and palpitations. Gastrointestinal: Positive for abdominal distention and abdominal pain (sstoma bulging ). Negative for constipation, diarrhea, nausea, rectal pain and vomiting. Endocrine: Negative for cold intolerance, heat intolerance and polyuria. Genitourinary: Negative for dysuria, frequency, hematuria and urgency. Musculoskeletal: Negative for arthralgias, back pain, gait problem, joint swelling, myalgias, neck pain and neck stiffness. Skin: Negative for rash and wound. Allergic/Immunologic: Negative for environmental allergies. Neurological: Negative for dizziness, tremors, seizures, syncope, facial asymmetry, speech difficulty, weakness, light-headedness, numbness and headaches. Hematological: Does not bruise/bleed easily. Psychiatric/Behavioral: Negative for agitation, hallucinations and suicidal ideas. The patient is not nervous/anxious.         Past Medical History:   Diagnosis Date    Arthritis     [facility] [address]     Diabetes mellitus ([facility] [address] [facility]     Hyperlipidemia     Hypertension     Insomnia     Myelodysplastic disease ([facility] [address] [facility]     Ulcerative colitis with intestinal obstruction ([facility] [address]     Unspecified diseases of blood and blood-forming organs      Past Surgical History:   Procedure Laterality Date    ADENOIDECTOMY      CARPAL TUNNEL RELEASE Right     CERVICAL FUSION      COLON SURGERY      COLOSTOMY      EYE SURGERY Bilateral     CATARACT    HERNIA REPAIR      JOINT REPLACEMENT      rt and lt    JOINT REPLACEMENT Bilateral     TONSILLECTOMY      TUBAL LIGATION       Family History   Problem Relation Age of Onset    Breast Cancer Mother     Diabetes Father     Heart Failure Sister     Diabetes Sister      Social History     Socioeconomic History    Marital status:       Spouse name: Not on file    Number of children: Not on file    Years of education: Not on file    Highest education level: Not on file   Occupational History    Not on file   Social Needs    Financial resource strain: Not hard at all    Food insecurity     Worry: Not on file     Inability: Not on file    Transportation needs     Medical: Not on file     Non-medical: Not on file   Tobacco Use    Smoking status: Never Smoker    Smokeless tobacco: Never Used   Substance and Sexual Activity    Alcohol use: No    Drug use: No    Sexual activity: Not on file   Lifestyle    Physical activity     Days per week: Not on file     Minutes per session: Not on file    Stress: Not on file   Relationships    Social connections     Talks on phone: Not on file     Gets together: Not on file     Attends Zoroastrianism service: Not on file     Active member of club or organization: Not on file     Attends meetings of clubs or organizations: Not on file     Relationship status: Not on file    Intimate partner violence     Fear of current or ex partner: Not on file     Emotionally abused: Not on file     Physically abused: Not on file     Forced sexual activity: Not on file   Other Topics Concern    Not on file   Social History Narrative    Not on file       Patient Active Problem List   Diagnosis    [facility]'s disease of rectum ([facility] [address]    Acute blood loss anemia    Anorectal pain    Anal stricture    Attention to ileostomy ([address]    CAD (coronary artery disease), native coronary artery    Chronic back pain    Crohn's disease without complication ([address]    Demand ischemia ([address]    Dyslipidemia    Fatigue associated with anemia    History of anticoagulant therapy    History of Nikolai's disease    History of total right hip replacement    History of ulcerative colitis    Hypertension    Myeloproliferative disorder ([address]    Other hyperlipidemia    Pain of right fibula    Pressure ulcer of buttock    Severe protein-calorie malnutrition (HCC)    Type 2 diabetes mellitus with diabetic nephropathy (HCC)        Vitals:    02/08/21 1431   BP: (!) 112/58   Site: Left Upper Arm   Position: Sitting   Cuff Size: Large Adult   Pulse: 63   Temp: 97.5 °F (36.4 °C)   TempSrc: Temporal   SpO2: 96%   Weight: 158 lb 8 oz (71.9 kg)   Height: 5' 2\" (1.575 m)       Physical Exam  Vitals signs reviewed. Constitutional:       General: She is not in acute distress. Appearance: She is ill-appearing. HENT:      Head: Normocephalic. Right Ear: Tympanic membrane, ear canal and external ear normal. There is no impacted cerumen. Left Ear: Tympanic membrane, ear canal and external ear normal. There is no impacted cerumen. Nose: No rhinorrhea. Mouth/Throat:      Pharynx: No posterior oropharyngeal erythema. Eyes:      General:         Right eye: No discharge. Left eye: No discharge. Extraocular Movements: Extraocular movements intact. Pupils: Pupils are equal, round, and reactive to light. Neck:      Musculoskeletal: Neck supple. Vascular: No carotid bruit. Cardiovascular:      Rate and Rhythm: Regular rhythm. Heart sounds: Normal heart sounds. No murmur. No gallop. Pulmonary:      Breath sounds: Rhonchi (occasional ) present. No wheezing or rales. Abdominal:      Palpations: Abdomen is soft. There is no mass. Tenderness: There is no abdominal tenderness. There is no guarding or rebound. Hernia: No hernia is present. Comments: Colostomy with bulging around it when  sitting and standing    Musculoskeletal: Normal range of motion. Lymphadenopathy:      Cervical: No cervical adenopathy. Skin:     General: Skin is warm and dry. Findings: No bruising or rash. Neurological:      General: No focal deficit present. Mental Status: She is alert and oriented to person, place, and time. Cranial Nerves: Cranial nerve deficit present. Motor: No weakness. Coordination: Coordination normal.      Gait: Gait normal.   Psychiatric:         Mood and Affect: Mood normal.         Behavior: Behavior normal.         Assessment and Plan:  Melanie Farley was seen today for abdominal pain and other. Diagnoses and all orders for this visit:    Secondary hypertension    Chronic midline low back pain with left-sided sciatica  -     HYDROcodone-acetaminophen (NORCO) 7.5-325 MG per tablet; Take 1 tablet by mouth 4 times daily for 30 days. Crohn's disease without complication, unspecified gastrointestinal tract location [facility])    History of ulcerative colitis    Other orders  -     simvastatin (ZOCOR) 40 MG tablet; Take 1 tablet by mouth nightly          Discussions/Education provided to patients during visit:  [] Discussed the importance to stop smoking. [] Advised to monitor eating habits. [] Reviewed and discussed Imaging results. [x] Reviewed and discussed Lab results. [x] Discussed the importance of drinking plenty of fluids. [x] Cut down on Salt, Caffeine, and Sugar. [x] Continue Medications as Discussed. [x] Communicated with patient any concerns, to phone office. Stoma nurse chad will refer her to   Labs soon      No follow-ups on file.       Seen By:  Mega Bowser DO

## 2021-02-17 ENCOUNTER — TELEPHONE (OUTPATIENT)
Dept: FAMILY MEDICINE CLINIC | Age: 76
End: 2021-02-17

## 2021-02-17 RX ORDER — BENZONATATE 100 MG/1
100 CAPSULE ORAL 3 TIMES DAILY PRN
Qty: 30 CAPSULE | Refills: 0 | Status: SHIPPED | OUTPATIENT
Start: 2021-02-17 | End: 2021-02-24

## 2021-02-17 NOTE — TELEPHONE ENCOUNTER
Cough is back. She gets her next Covid vaccine 3-2-21 she wants to be able to get it. Can you send in something for her cough. Denies-fever, SOB, fatigue, loss of taste,smell. Last office visit 2-8-21.    Her pharmacy is [facility] [place]

## 2021-02-24 DIAGNOSIS — F51.01 PRIMARY INSOMNIA: Primary | ICD-10-CM

## 2021-02-24 RX ORDER — ZOLPIDEM TARTRATE 5 MG/1
5 TABLET ORAL NIGHTLY
Qty: 30 TABLET | Refills: 2 | Status: SHIPPED
Start: 2021-02-24 | End: 2021-05-25 | Stop reason: SDUPTHER

## 2021-03-01 ENCOUNTER — TELEPHONE (OUTPATIENT)
Dept: FAMILY MEDICINE CLINIC | Age: 76
End: 2021-03-01

## 2021-03-03 ENCOUNTER — TELEPHONE (OUTPATIENT)
Dept: FAMILY MEDICINE CLINIC | Age: 76
End: 2021-03-03

## 2021-03-03 NOTE — TELEPHONE ENCOUNTER
Spoke with patient and she stated she will call the office closer to her appointment with Marcus Hurst on 05- and have her blood work done so that he can review results at appointment.

## 2021-03-08 DIAGNOSIS — M54.42 CHRONIC MIDLINE LOW BACK PAIN WITH LEFT-SIDED SCIATICA: ICD-10-CM

## 2021-03-08 DIAGNOSIS — G89.29 CHRONIC MIDLINE LOW BACK PAIN WITH LEFT-SIDED SCIATICA: ICD-10-CM

## 2021-03-08 RX ORDER — HYDROCODONE BITARTRATE AND ACETAMINOPHEN 7.5; 325 MG/1; MG/1
1 TABLET ORAL 4 TIMES DAILY
Qty: 120 TABLET | Refills: 0 | Status: SHIPPED
Start: 2021-03-08 | End: 2021-04-01 | Stop reason: SDUPTHER

## 2021-03-29 RX ORDER — OMEPRAZOLE 40 MG/1
40 CAPSULE, DELAYED RELEASE ORAL DAILY
Qty: 90 CAPSULE | Refills: 1 | Status: SHIPPED
Start: 2021-03-29 | End: 2021-04-02 | Stop reason: SDUPTHER

## 2021-04-01 DIAGNOSIS — M54.42 CHRONIC MIDLINE LOW BACK PAIN WITH LEFT-SIDED SCIATICA: ICD-10-CM

## 2021-04-01 DIAGNOSIS — G89.29 CHRONIC MIDLINE LOW BACK PAIN WITH LEFT-SIDED SCIATICA: ICD-10-CM

## 2021-04-01 RX ORDER — HYDROCODONE BITARTRATE AND ACETAMINOPHEN 7.5; 325 MG/1; MG/1
1 TABLET ORAL 4 TIMES DAILY
Qty: 120 TABLET | Refills: 0 | Status: SHIPPED | OUTPATIENT
Start: 2021-04-01 | End: 2021-05-01

## 2021-04-02 RX ORDER — OMEPRAZOLE 40 MG/1
40 CAPSULE, DELAYED RELEASE ORAL DAILY
Qty: 90 CAPSULE | Refills: 1 | Status: SHIPPED
Start: 2021-04-02 | End: 2021-10-01 | Stop reason: SDUPTHER

## 2021-04-09 DIAGNOSIS — F41.9 ANXIETY: Primary | ICD-10-CM

## 2021-04-09 RX ORDER — METOPROLOL TARTRATE 50 MG/1
50 TABLET, FILM COATED ORAL DAILY
Qty: 90 TABLET | Refills: 1 | Status: SHIPPED
Start: 2021-04-09 | End: 2021-11-04 | Stop reason: SDUPTHER

## 2021-04-09 RX ORDER — LORAZEPAM 0.5 MG/1
0.5 TABLET ORAL DAILY
Qty: 30 TABLET | Refills: 2 | Status: SHIPPED | OUTPATIENT
Start: 2021-04-09 | End: 2021-05-09

## 2021-04-15 ENCOUNTER — HOSPITAL ENCOUNTER (OUTPATIENT)
Dept: WOUND CARE | Age: 76
Discharge: HOME | End: 2021-04-15
Payer: MEDICARE

## 2021-04-15 PROCEDURE — 99212 OFFICE O/P EST SF 10 MIN: CPT

## 2021-04-15 NOTE — FLOWSHEET NOTE
Clinical Level of Care Assessment    Outpatient Ostomy Care      NAME:  Errol Lyn  YOB: 1945  MEDICAL RECORD NUMBER:  [id number]   DATE:  4/15/2021      Patient Adrianna Pagan Assessment- Document in Flowsheet I&O   Points   Review of chart []   0   Assess Complete Ostomy tab in Navigator for assessment of; stoma status, peristomal skin, presence of hernia/stool consistency/diet/related medications   Simple adjustments to pouch size/pouch system, new stoma pattern, accessory addition/deletion. []   1   Assess Complete Ostomy tab in Navigator for assessment of; stoma status, peristomal skin, presence of hernia/stool consistency/diet/related medications   Moderate adjustments to pouch size/pouch system, new stoma pattern, accessory addition/deletion. Observe patient/caregiver with hands-on care. 1-2 adjustments to pouch size/system/skin care/accessory addition or deletion. [x]   2   Assess Complete Ostomy tab in Navigator for assessment of; stoma status, peristomal skin, presence of hernia/stool consistency/diet/related medications   Complex adjustments to pouch size/pouch system, new stoma pattern, accessory addition/deletion. 3 or more complex adjustments to pouch size/system/skin care/accessory addition or deletion. Observe patient/caregiver with hands-on care. Assess patient/patient abdomen for optimal pre-marked stoma site. Assess patient abdomen for type of hernia belt/accessory needed. []   3         Ambulation Status Documented in CN Clinical Note  Status Definition Points   Independent Independently able to ambulate. Fully able (without any assistance) to get on/off exam table/chair. []   0   Minimal Physical Assistance Requires physical assistance of one person to ambulate and/or position patient to be examined. Includes necessary physical assistance to position lower extremities on/off stool.    [x]   1   Moderate Physical Assistance Requires at least one staff member to physically assist patient in ambulating into treatment room, and/or on off chair/bed. Requires assistance to bathroom. []   2   Full Assistance Requires assistance of at least two staff members to transfer patient into treatment room and/or on/off bed/chair. \"Total Transfer\". Unable to use bathroom requires bedside commode and/or bedpan []   3       Teaching Effort Documented in [facility] Clinical Note  Effort Definition Points   No Teaching  []   0   General Initial/Simple lesson:  Assess readiness to learn, assess patient learning style to determine educational flow/special needs for learning. Teaching related to 1-3 topics  Documentation in [facility] completed. []   1   Intermediate Assess readiness to learn, assess patient learning style to determine educational flow/special needs for learning. Teaching related to 3-4 topics. Hernia belt application and care considerations  Documentation in [facility] completed. [x]   2   Complex Assess readiness to learn, assess patient learning style to determine educational flow/special needs for learning. Teaching of greater than 5 additional topics   Pre-operative ostomy education with review of written resources for patient/family/caregiver as needed. Demonstration/return demonstration of ostomy irrigation  Documentation in [facility] completed. []   3     Patient Assessment and Planning in [facility] Clinical Note   Planning Definition Points   Simple Simple pouch change procedure completed and reviewed with patient/family/caregiver   Documentation in [facility] completed. [x]   1   Intermediate Moderate level of follow-up needs:   Pouch change/discharge procedure revised and reviewed with patient/caregiver. Communications with outside resources; i.e. Telephone calls to Surgeon/ PCP, family/caregiver, home health, ECF. Documentation in [facility] completed.      []   2   Complex Complex level of instructions/changes:   Family/Caregiver learning/demonstration/return demonstration visit. Pouching/discharge procedure revised/reviewed with patient/family/caregiver. Contact with outside resources; i.e. communication with Surgeon/ PCP, home health, ECF. Contact/referral to ostomy appliance supplier for new or additional products. Review when to call [facility] or schedule follow-up visit. Referral to Emergency Department   Documentation in [facility] completed. []   3       Is this the Patient's First Visit with [facility] @ [facility]? Yes    Is this Patient Established to this [facility] within the last 3 years? 04/15/21 1300   Ileostomy Ileostomy   No Placement Date or Time found. Pre-existing: Yes  Ileostomy Type: Ileostomy   Stomal Appliance 2 piece; Changed   Stoma  Assessment Red;Moist;Protrudes   Peristomal Assessment Excoriation   Treatment Bag change;Site care;Stoma powder   Stool Color Brown   Stool Appearance Watery   Stool Amount Small   Yes            Clinical Level of Care      Points  0-3  Level 1 []     Points  4-6  Level 2 [x]     Points  7-8  Level 3 []     Points  9-10  Level 4 []     Points  11-12  Level 5 []     Patient brought own supplies. Pouch change with alon convex pouch and barrier ring. Belt also applied.   Patient educated on need for adequate nutrition, 5-6 small meals daily  Patient to make an appointment with a local gastroenterologist.      Electronically signed by Kindra Kc RN on 4/15/2021 at 4:48 PM

## 2021-04-21 ENCOUNTER — TELEPHONE (OUTPATIENT)
Dept: FAMILY MEDICINE CLINIC | Age: 76
End: 2021-04-21

## 2021-04-21 NOTE — TELEPHONE ENCOUNTER
Saw pt. Yesterday for problems with her colostomy leaking. Needs order  \"ET Nurse to Eval and treat from Dr. Jamie Nathan as pt.'s Clev. Surgeon is not at [facility]. Any more. Fx order to [phone].

## 2021-04-22 ENCOUNTER — TELEPHONE (OUTPATIENT)
Dept: FAMILY MEDICINE CLINIC | Age: 76
End: 2021-04-22

## 2021-05-04 DIAGNOSIS — M54.42 CHRONIC MIDLINE LOW BACK PAIN WITH LEFT-SIDED SCIATICA: Primary | ICD-10-CM

## 2021-05-04 DIAGNOSIS — G89.29 CHRONIC MIDLINE LOW BACK PAIN WITH LEFT-SIDED SCIATICA: Primary | ICD-10-CM

## 2021-05-04 RX ORDER — HYDROCODONE BITARTRATE AND ACETAMINOPHEN 7.5; 325 MG/1; MG/1
1 TABLET ORAL 4 TIMES DAILY
Qty: 120 TABLET | Refills: 0 | Status: SHIPPED
Start: 2021-05-04 | End: 2021-06-02 | Stop reason: SDUPTHER

## 2021-05-04 RX ORDER — HYDROCODONE BITARTRATE AND ACETAMINOPHEN 7.5; 325 MG/1; MG/1
1 TABLET ORAL 4 TIMES DAILY
COMMUNITY
End: 2021-05-04 | Stop reason: SDUPTHER

## 2021-05-11 ENCOUNTER — TELEPHONE (OUTPATIENT)
Dept: FAMILY MEDICINE CLINIC | Age: 76
End: 2021-05-11

## 2021-05-14 DIAGNOSIS — M54.42 CHRONIC MIDLINE LOW BACK PAIN WITH LEFT-SIDED SCIATICA: ICD-10-CM

## 2021-05-14 DIAGNOSIS — Z87.19 HISTORY OF ULCERATIVE COLITIS: ICD-10-CM

## 2021-05-14 DIAGNOSIS — R73.01 IFG (IMPAIRED FASTING GLUCOSE): ICD-10-CM

## 2021-05-14 DIAGNOSIS — I15.9 SECONDARY HYPERTENSION: ICD-10-CM

## 2021-05-14 DIAGNOSIS — G89.29 CHRONIC MIDLINE LOW BACK PAIN WITH LEFT-SIDED SCIATICA: ICD-10-CM

## 2021-05-14 DIAGNOSIS — K50.90 CROHN'S DISEASE WITHOUT COMPLICATION, UNSPECIFIED GASTROINTESTINAL TRACT LOCATION (HCC): ICD-10-CM

## 2021-05-14 DIAGNOSIS — E78.49 OTHER HYPERLIPIDEMIA: ICD-10-CM

## 2021-05-14 LAB
ALBUMIN SERPL-MCNC: 4.2 G/DL (ref 3.5–5.2)
ALP BLD-CCNC: 95 U/L (ref 35–104)
ALT SERPL-CCNC: 9 U/L (ref 0–32)
ANION GAP SERPL CALCULATED.3IONS-SCNC: 19 MMOL/L (ref 7–16)
ANISOCYTOSIS: ABNORMAL
AST SERPL-CCNC: 31 U/L (ref 0–31)
BASOPHILS ABSOLUTE: 0.09 E9/L (ref 0–0.2)
BASOPHILS RELATIVE PERCENT: 1 % (ref 0–2)
BILIRUB SERPL-MCNC: 0.4 MG/DL (ref 0–1.2)
BUN BLDV-MCNC: 14 MG/DL (ref 6–23)
BURR CELLS: ABNORMAL
CALCIUM SERPL-MCNC: 9.9 MG/DL (ref 8.6–10.2)
CHLORIDE BLD-SCNC: 100 MMOL/L (ref 98–107)
CHOLESTEROL, TOTAL: 212 MG/DL (ref 0–199)
CO2: 20 MMOL/L (ref 22–29)
CREAT SERPL-MCNC: 0.7 MG/DL (ref 0.5–1)
EOSINOPHILS ABSOLUTE: 0.28 E9/L (ref 0.05–0.5)
EOSINOPHILS RELATIVE PERCENT: 3 % (ref 0–6)
GFR AFRICAN AMERICAN: >60
GFR NON-AFRICAN AMERICAN: >60 ML/MIN/1.73
GLUCOSE BLD-MCNC: 99 MG/DL (ref 74–99)
HBA1C MFR BLD: 6.5 % (ref 4–5.6)
HCT VFR BLD CALC: 44.8 % (ref 34–48)
HDLC SERPL-MCNC: 52 MG/DL
HEMOGLOBIN: 14.5 G/DL (ref 11.5–15.5)
IMMATURE GRANULOCYTES #: 0.05 E9/L
IMMATURE GRANULOCYTES %: 0.5 % (ref 0–5)
LDL CHOLESTEROL CALCULATED: 124 MG/DL (ref 0–99)
LYMPHOCYTES ABSOLUTE: 1.21 E9/L (ref 1.5–4)
LYMPHOCYTES RELATIVE PERCENT: 12.9 % (ref 20–42)
MCH RBC QN AUTO: 38.4 PG (ref 26–35)
MCHC RBC AUTO-ENTMCNC: 32.4 % (ref 32–34.5)
MCV RBC AUTO: 118.5 FL (ref 80–99.9)
MONOCYTES ABSOLUTE: 0.68 E9/L (ref 0.1–0.95)
MONOCYTES RELATIVE PERCENT: 7.3 % (ref 2–12)
NEUTROPHILS ABSOLUTE: 7.05 E9/L (ref 1.8–7.3)
NEUTROPHILS RELATIVE PERCENT: 75.3 % (ref 43–80)
OVALOCYTES: ABNORMAL
PDW BLD-RTO: 14.2 FL (ref 11.5–15)
PLATELET # BLD: 399 E9/L (ref 130–450)
PMV BLD AUTO: 9.9 FL (ref 7–12)
POIKILOCYTES: ABNORMAL
POTASSIUM SERPL-SCNC: 4.9 MMOL/L (ref 3.5–5)
RBC # BLD: 3.78 E12/L (ref 3.5–5.5)
SODIUM BLD-SCNC: 139 MMOL/L (ref 132–146)
T4 FREE: 1.23 NG/DL (ref 0.93–1.7)
TOTAL PROTEIN: 7.1 G/DL (ref 6.4–8.3)
TRIGL SERPL-MCNC: 178 MG/DL (ref 0–149)
TSH SERPL DL<=0.05 MIU/L-ACNC: 1.75 UIU/ML (ref 0.27–4.2)
VLDLC SERPL CALC-MCNC: 36 MG/DL
WBC # BLD: 9.4 E9/L (ref 4.5–11.5)

## 2021-05-17 ENCOUNTER — OFFICE VISIT (OUTPATIENT)
Dept: FAMILY MEDICINE CLINIC | Age: 76
End: 2021-05-17
Payer: MEDICARE

## 2021-05-17 VITALS
SYSTOLIC BLOOD PRESSURE: 128 MMHG | TEMPERATURE: 97.8 F | WEIGHT: 162 LBS | HEIGHT: 62 IN | BODY MASS INDEX: 29.81 KG/M2 | RESPIRATION RATE: 20 BRPM | DIASTOLIC BLOOD PRESSURE: 80 MMHG | HEART RATE: 89 BPM | OXYGEN SATURATION: 99 %

## 2021-05-17 VITALS
HEIGHT: 62 IN | DIASTOLIC BLOOD PRESSURE: 80 MMHG | TEMPERATURE: 97.8 F | OXYGEN SATURATION: 99 % | RESPIRATION RATE: 20 BRPM | WEIGHT: 162 LBS | HEART RATE: 89 BPM | SYSTOLIC BLOOD PRESSURE: 128 MMHG | BODY MASS INDEX: 29.81 KG/M2

## 2021-05-17 DIAGNOSIS — Z78.0 ASYMPTOMATIC MENOPAUSAL STATE: ICD-10-CM

## 2021-05-17 DIAGNOSIS — D47.1 MYELOPROLIFERATIVE DISORDER (HCC): ICD-10-CM

## 2021-05-17 DIAGNOSIS — Z43.2 ATTENTION TO ILEOSTOMY (HCC): ICD-10-CM

## 2021-05-17 DIAGNOSIS — Z00.00 ROUTINE GENERAL MEDICAL EXAMINATION AT A HEALTH CARE FACILITY: ICD-10-CM

## 2021-05-17 DIAGNOSIS — Z87.19 HISTORY OF ULCERATIVE COLITIS: Primary | ICD-10-CM

## 2021-05-17 DIAGNOSIS — Z79.4 TYPE 2 DIABETES MELLITUS WITH DIABETIC NEPHROPATHY, WITH LONG-TERM CURRENT USE OF INSULIN (HCC): ICD-10-CM

## 2021-05-17 DIAGNOSIS — E11.21 TYPE 2 DIABETES MELLITUS WITH DIABETIC NEPHROPATHY, WITH LONG-TERM CURRENT USE OF INSULIN (HCC): ICD-10-CM

## 2021-05-17 DIAGNOSIS — E43 SEVERE PROTEIN-CALORIE MALNUTRITION (HCC): ICD-10-CM

## 2021-05-17 DIAGNOSIS — I25.119 CORONARY ARTERY DISEASE INVOLVING NATIVE CORONARY ARTERY WITH ANGINA PECTORIS, UNSPECIFIED WHETHER NATIVE OR TRANSPLANTED HEART (HCC): ICD-10-CM

## 2021-05-17 PROCEDURE — 4040F PNEUMOC VAC/ADMIN/RCVD: CPT | Performed by: FAMILY MEDICINE

## 2021-05-17 PROCEDURE — 99214 OFFICE O/P EST MOD 30 MIN: CPT | Performed by: FAMILY MEDICINE

## 2021-05-17 PROCEDURE — G8400 PT W/DXA NO RESULTS DOC: HCPCS | Performed by: FAMILY MEDICINE

## 2021-05-17 PROCEDURE — 1123F ACP DISCUSS/DSCN MKR DOCD: CPT | Performed by: FAMILY MEDICINE

## 2021-05-17 PROCEDURE — 4040F PNEUMOC VAC/ADMIN/RCVD: CPT | Performed by: NURSE PRACTITIONER

## 2021-05-17 PROCEDURE — G8417 CALC BMI ABV UP PARAM F/U: HCPCS | Performed by: FAMILY MEDICINE

## 2021-05-17 PROCEDURE — 1090F PRES/ABSN URINE INCON ASSESS: CPT | Performed by: FAMILY MEDICINE

## 2021-05-17 PROCEDURE — 1036F TOBACCO NON-USER: CPT | Performed by: FAMILY MEDICINE

## 2021-05-17 PROCEDURE — 1123F ACP DISCUSS/DSCN MKR DOCD: CPT | Performed by: NURSE PRACTITIONER

## 2021-05-17 PROCEDURE — G8427 DOCREV CUR MEDS BY ELIG CLIN: HCPCS | Performed by: FAMILY MEDICINE

## 2021-05-17 PROCEDURE — G0438 PPPS, INITIAL VISIT: HCPCS | Performed by: NURSE PRACTITIONER

## 2021-05-17 RX ORDER — TRIAMCINOLONE ACETONIDE 55 UG/1
2 SPRAY, METERED NASAL DAILY
Qty: 1 INHALER | Refills: 5 | Status: SHIPPED
Start: 2021-05-17 | End: 2022-07-26

## 2021-05-17 ASSESSMENT — LIFESTYLE VARIABLES
AUDIT TOTAL SCORE: INCOMPLETE
HOW OFTEN DO YOU HAVE A DRINK CONTAINING ALCOHOL: NEVER
HOW OFTEN DO YOU HAVE A DRINK CONTAINING ALCOHOL: 0
AUDIT-C TOTAL SCORE: INCOMPLETE

## 2021-05-17 ASSESSMENT — ENCOUNTER SYMPTOMS
COUGH: 0
VOICE CHANGE: 0
CHEST TIGHTNESS: 0
WHEEZING: 0
ABDOMINAL DISTENTION: 0
SINUS PAIN: 0
DIARRHEA: 0
APNEA: 0
BACK PAIN: 0
PHOTOPHOBIA: 0
VOMITING: 0
SORE THROAT: 0
NAUSEA: 0
FACIAL SWELLING: 0
RHINORRHEA: 0
CONSTIPATION: 0
SHORTNESS OF BREATH: 0
RECTAL PAIN: 0
ABDOMINAL PAIN: 1

## 2021-05-17 ASSESSMENT — PATIENT HEALTH QUESTIONNAIRE - PHQ9
SUM OF ALL RESPONSES TO PHQ QUESTIONS 1-9: 0
1. LITTLE INTEREST OR PLEASURE IN DOING THINGS: 0
SUM OF ALL RESPONSES TO PHQ9 QUESTIONS 1 & 2: 0
1. LITTLE INTEREST OR PLEASURE IN DOING THINGS: 0
SUM OF ALL RESPONSES TO PHQ9 QUESTIONS 1 & 2: 0

## 2021-05-17 NOTE — PROGRESS NOTES
21  Amber Andrade : 1945 Sex: female  Age: 64 y.o. Chief Complaint   Patient presents with   [address]     lab review today     Other     wants to talk about the stoma team going there, needs a new script fpr handi cap placard    Incontinence     patient is incontineceoff and on       Overall check up       Review of Systems   Constitutional: Negative for appetite change, chills, diaphoresis, fatigue, fever and unexpected weight change. HENT: Negative for congestion, dental problem, ear discharge, ear pain, facial swelling, hearing loss, mouth sores, rhinorrhea, sinus pain, sore throat, tinnitus and voice change. Eyes: Negative for photophobia and visual disturbance (no visual changes). Respiratory: Negative for apnea, cough, chest tightness, shortness of breath and wheezing. Cardiovascular: Negative for chest pain and palpitations. Gastrointestinal: Positive for abdominal pain. Negative for abdominal distention, constipation, diarrhea, nausea, rectal pain and vomiting. Problems with eating and stoma    Endocrine: Negative for cold intolerance, heat intolerance and polyuria. Genitourinary: Negative for dysuria, frequency, hematuria and urgency. Musculoskeletal: Positive for arthralgias. Negative for back pain, gait problem, joint swelling, myalgias, neck pain and neck stiffness. Skin: Negative for rash and wound. Allergic/Immunologic: Negative for environmental allergies. Neurological: Negative for dizziness, tremors, seizures, syncope, facial asymmetry, speech difficulty, weakness, light-headedness, numbness and headaches. Hematological: Does not bruise/bleed easily. Psychiatric/Behavioral: Negative for agitation, hallucinations and suicidal ideas. The patient is nervous/anxious. Physical Exam  Vitals reviewed. Constitutional:       General: She is not in acute distress. Appearance: She is not ill-appearing. HENT:      Head: Normocephalic. Right Ear: Tympanic membrane, ear canal and external ear normal. There is no impacted cerumen. Left Ear: Tympanic membrane, ear canal and external ear normal. There is no impacted cerumen. Nose: No rhinorrhea. Mouth/Throat:      Pharynx: No posterior oropharyngeal erythema. Eyes:      General:         Right eye: No discharge. Left eye: No discharge. Extraocular Movements: Extraocular movements intact. Pupils: Pupils are equal, round, and reactive to light. Neck:      Vascular: No carotid bruit. Cardiovascular:      Rate and Rhythm: Regular rhythm. Heart sounds: Murmur (1/6) heard. No gallop. Pulmonary:      Breath sounds: Normal breath sounds. No wheezing, rhonchi or rales. Abdominal:      Palpations: Abdomen is soft. There is no mass. Tenderness: There is abdominal tenderness. There is no guarding or rebound. Hernia: A hernia (stoma and hernia looks secure ) is present. Comments: stoma   Musculoskeletal:         General: Normal range of motion. Cervical back: Neck supple. Comments: Multiple muscular apin area back joints    Lymphadenopathy:      Cervical: No cervical adenopathy. Skin:     General: Skin is warm and dry. Findings: No bruising or rash. Neurological:      General: No focal deficit present. Mental Status: She is alert and oriented to person, place, and time. Cranial Nerves: No cranial nerve deficit. Motor: Weakness present. Coordination: Coordination normal.      Gait: Gait normal.   Psychiatric:         Mood and Affect: Mood normal.         Behavior: Behavior normal.      Comments: Many family happenings some depression from that and passing of           Assessment and Plan:  Tho Melendez was seen today for discuss labs, other and incontinence.     Diagnoses and all orders for this visit:    History of ulcerative colitis  -     External Referral To Gastroenterology    Coronary artery disease involving native coronary artery with angina pectoris, unspecified whether native or transplanted heart [facility])    Attention to ileostomy ([facility] [address]    Severe protein-calorie malnutrition ([facility] [address]    Type 2 diabetes mellitus with diabetic nephropathy, with long-term current use of insulin ([facility] [address]    Myeloproliferative disorder ([facility] [address]    Other orders  -     triamcinolone (NASACORT ALLERGY 24HR) 55 MCG/ACT nasal inhaler; 2 sprays by Each Nostril route daily Prn  -     Handicap Placard MISC; Permanent disability   5 years          Discussions/Education provided to patients during visit:  [] Discussed the importance to stop smoking. [] Advised to monitor eating habits. [] Reviewed and discussed Imaging results. [x] Reviewed and discussed Lab results. [x] Discussed the importance of drinking plenty of fluids. [x] Cut down on Salt, Caffeine, and Sugar. [x] Continue Medications as Discussed. [x] Communicated with patient any concerns, to phone office. Consoled on her situation and reer to Dr Contreras Navas ,daughter goes to him, for intermitant abdominal pain and stoma situation   She does not want any meds for depression at this time       Return in about 1 month (around 6/17/2021).       Seen By:  Gordon Montilla DO

## 2021-05-17 NOTE — PATIENT INSTRUCTIONS
Personalized Preventive Plan for Tres Khan - 5/17/2021  Medicare offers a range of preventive health benefits. Some of the tests and screenings are paid in full while other may be subject to a deductible, co-insurance, and/or copay. Some of these benefits include a comprehensive review of your medical history including lifestyle, illnesses that may run in your family, and various assessments and screenings as appropriate. After reviewing your medical record and screening and assessments performed today your provider may have ordered immunizations, labs, imaging, and/or referrals for you. A list of these orders (if applicable) as well as your Preventive Care list are included within your After Visit Summary for your review. Other Preventive Recommendations:    · A preventive eye exam performed by an eye specialist is recommended every 1-2 years to screen for glaucoma; cataracts, macular degeneration, and other eye disorders. · A preventive dental visit is recommended every 6 months. · Try to get at least 150 minutes of exercise per week or 10,000 steps per day on a pedometer . · Order or download the FREE \"Exercise & Physical Activity: Your Everyday Guide\" from [employer]. Call 3-248.725.7295 or search [employer] online. · You need 7368-4872 mg of calcium and 2282-5480 IU of vitamin D per day. It is possible to meet your calcium requirement with diet alone, but a vitamin D supplement is usually necessary to meet this goal.  · When exposed to the sun, use a sunscreen that protects against both UVA and UVB radiation with an SPF of 30 or greater. Reapply every 2 to 3 hours or after sweating, drying off with a towel, or swimming. · Always wear a seat belt when traveling in a car. Always wear a helmet when riding a bicycle or motorcycle.

## 2021-05-25 DIAGNOSIS — F51.01 PRIMARY INSOMNIA: ICD-10-CM

## 2021-05-25 RX ORDER — FUROSEMIDE 20 MG/1
20 TABLET ORAL DAILY
Qty: 90 TABLET | Refills: 1 | Status: SHIPPED
Start: 2021-05-25 | End: 2022-08-24 | Stop reason: SDUPTHER

## 2021-05-25 RX ORDER — ZOLPIDEM TARTRATE 5 MG/1
5 TABLET ORAL NIGHTLY
Qty: 30 TABLET | Refills: 2 | Status: SHIPPED
Start: 2021-05-25 | End: 2021-05-28 | Stop reason: SDUPTHER

## 2021-05-28 DIAGNOSIS — F51.01 PRIMARY INSOMNIA: ICD-10-CM

## 2021-05-28 RX ORDER — ZOLPIDEM TARTRATE 5 MG/1
5 TABLET ORAL NIGHTLY
Qty: 30 TABLET | Refills: 2 | Status: SHIPPED
Start: 2021-05-28 | End: 2021-08-23 | Stop reason: SDUPTHER

## 2021-06-02 DIAGNOSIS — M54.42 CHRONIC MIDLINE LOW BACK PAIN WITH LEFT-SIDED SCIATICA: ICD-10-CM

## 2021-06-02 DIAGNOSIS — G89.29 CHRONIC MIDLINE LOW BACK PAIN WITH LEFT-SIDED SCIATICA: ICD-10-CM

## 2021-06-02 RX ORDER — HYDROCODONE BITARTRATE AND ACETAMINOPHEN 7.5; 325 MG/1; MG/1
1 TABLET ORAL 4 TIMES DAILY
Qty: 120 TABLET | Refills: 0 | Status: SHIPPED
Start: 2021-06-02 | End: 2021-06-24 | Stop reason: SDUPTHER

## 2021-06-04 ENCOUNTER — TELEPHONE (OUTPATIENT)
Dept: FAMILY MEDICINE CLINIC | Age: 76
End: 2021-06-04

## 2021-06-04 RX ORDER — FOLIC ACID 1 MG/1
1 TABLET ORAL DAILY
Qty: 90 TABLET | Refills: 1 | Status: SHIPPED
Start: 2021-06-04 | End: 2022-02-17 | Stop reason: SDUPTHER

## 2021-06-04 RX ORDER — DOXYCYCLINE HYCLATE 100 MG
100 TABLET ORAL 2 TIMES DAILY
Qty: 20 TABLET | Refills: 0 | Status: SHIPPED | OUTPATIENT
Start: 2021-06-04 | End: 2021-06-14

## 2021-06-04 NOTE — TELEPHONE ENCOUNTER
Daughter has pneumonia. Toshia Saba has been exposed to her. Toshia Saba asking if Dr. Dunia Tirado would send her in an antibiotic as she is starting to feel a little chest congestion. Denies any fever or chills. [facility]    Does not want Z-carlos it does not work for her.

## 2021-06-24 DIAGNOSIS — G89.29 CHRONIC MIDLINE LOW BACK PAIN WITH LEFT-SIDED SCIATICA: ICD-10-CM

## 2021-06-24 DIAGNOSIS — M54.42 CHRONIC MIDLINE LOW BACK PAIN WITH LEFT-SIDED SCIATICA: ICD-10-CM

## 2021-06-24 RX ORDER — LORAZEPAM 0.5 MG/1
TABLET ORAL
COMMUNITY
Start: 2021-05-21 | End: 2021-06-24 | Stop reason: SDUPTHER

## 2021-06-24 RX ORDER — LORAZEPAM 0.5 MG/1
0.5 TABLET ORAL NIGHTLY
Qty: 30 TABLET | Refills: 2 | Status: SHIPPED
Start: 2021-06-24 | End: 2021-09-21 | Stop reason: SDUPTHER

## 2021-06-24 RX ORDER — HYDROCODONE BITARTRATE AND ACETAMINOPHEN 7.5; 325 MG/1; MG/1
1 TABLET ORAL 4 TIMES DAILY
Qty: 120 TABLET | Refills: 0 | Status: SHIPPED
Start: 2021-06-24 | End: 2021-07-27 | Stop reason: SDUPTHER

## 2021-06-24 RX ORDER — HYDROXYUREA 500 MG/1
500 CAPSULE ORAL DAILY
Qty: 90 CAPSULE | Refills: 1 | Status: SHIPPED
Start: 2021-06-24 | End: 2021-10-01 | Stop reason: SDUPTHER

## 2021-06-25 RX ORDER — CITALOPRAM 20 MG/1
20 TABLET ORAL DAILY
Qty: 90 TABLET | Refills: 1 | Status: SHIPPED
Start: 2021-06-25 | End: 2021-10-01 | Stop reason: SDUPTHER

## 2021-07-01 ENCOUNTER — TELEPHONE (OUTPATIENT)
Dept: FAMILY MEDICINE CLINIC | Age: 76
End: 2021-07-01

## 2021-07-22 NOTE — TELEPHONE ENCOUNTER
Starting with leg cramps again,some times pretty severe. Could she have that cream again, something like diclo. ...

## 2021-07-27 DIAGNOSIS — M54.42 CHRONIC MIDLINE LOW BACK PAIN WITH LEFT-SIDED SCIATICA: ICD-10-CM

## 2021-07-27 DIAGNOSIS — G89.29 CHRONIC MIDLINE LOW BACK PAIN WITH LEFT-SIDED SCIATICA: ICD-10-CM

## 2021-07-27 RX ORDER — HYDROCODONE BITARTRATE AND ACETAMINOPHEN 7.5; 325 MG/1; MG/1
1 TABLET ORAL 4 TIMES DAILY
Qty: 120 TABLET | Refills: 0 | Status: SHIPPED
Start: 2021-07-27 | End: 2021-08-23 | Stop reason: SDUPTHER

## 2021-07-30 ENCOUNTER — TELEPHONE (OUTPATIENT)
Dept: FAMILY MEDICINE CLINIC | Age: 76
End: 2021-07-30

## 2021-07-30 RX ORDER — DOXYCYCLINE HYCLATE 100 MG
100 TABLET ORAL 2 TIMES DAILY
Qty: 20 TABLET | Refills: 0 | Status: SHIPPED | OUTPATIENT
Start: 2021-07-30 | End: 2021-08-09

## 2021-07-30 NOTE — TELEPHONE ENCOUNTER
----- Message from Arlette Samples sent at 7/30/2021 10:16 AM EDT -----  Subject: Message to Provider    QUESTIONS  Information for Provider? Pt is requesting something be called in for her,   she has some chest congesting that just recently started up again but has   been on and off [id number]. She states she has a tight cough, wanting   something to also help break that up. [facility] ([facility]) [phone].   ---------------------------------------------------------------------------  --------------  Marielos ROBLES  What is the best way for the office to contact you? OK to leave message on   voicemail  Preferred Call Back Phone Number? [phone]  ---------------------------------------------------------------------------  --------------  SCRIPT ANSWERS  Relationship to Patient?  Self

## 2021-08-18 RX ORDER — SIMVASTATIN 40 MG
40 TABLET ORAL NIGHTLY
Qty: 90 TABLET | Refills: 1 | Status: SHIPPED
Start: 2021-08-18 | End: 2022-02-17 | Stop reason: SDUPTHER

## 2021-08-23 DIAGNOSIS — F51.01 PRIMARY INSOMNIA: ICD-10-CM

## 2021-08-23 DIAGNOSIS — G89.29 CHRONIC MIDLINE LOW BACK PAIN WITH LEFT-SIDED SCIATICA: ICD-10-CM

## 2021-08-23 DIAGNOSIS — M54.42 CHRONIC MIDLINE LOW BACK PAIN WITH LEFT-SIDED SCIATICA: ICD-10-CM

## 2021-08-23 RX ORDER — HYDROCODONE BITARTRATE AND ACETAMINOPHEN 7.5; 325 MG/1; MG/1
1 TABLET ORAL 4 TIMES DAILY
Qty: 120 TABLET | Refills: 0 | Status: SHIPPED
Start: 2021-08-23 | End: 2021-09-21 | Stop reason: SDUPTHER

## 2021-08-23 RX ORDER — ZOLPIDEM TARTRATE 5 MG/1
5 TABLET ORAL NIGHTLY
Qty: 30 TABLET | Refills: 2 | Status: SHIPPED
Start: 2021-08-23 | End: 2021-11-23 | Stop reason: SDUPTHER

## 2021-08-23 NOTE — TELEPHONE ENCOUNTER
Patients last appointment 5/17/2021.   Patients next scheduled appointment   Future Appointments   Date Time Provider Brent Rueda   9/20/2021  3:00 PM DO TEA [facility]

## 2021-09-10 RX ORDER — DICYCLOMINE HCL 20 MG
20 TABLET ORAL 2 TIMES DAILY
Qty: 60 TABLET | Refills: 2 | Status: SHIPPED
Start: 2021-09-10 | End: 2022-01-10 | Stop reason: SDUPTHER

## 2021-09-20 DIAGNOSIS — G89.29 CHRONIC MIDLINE LOW BACK PAIN WITH LEFT-SIDED SCIATICA: ICD-10-CM

## 2021-09-20 DIAGNOSIS — M54.42 CHRONIC MIDLINE LOW BACK PAIN WITH LEFT-SIDED SCIATICA: ICD-10-CM

## 2021-09-20 NOTE — TELEPHONE ENCOUNTER
Patients last appointment 5/17/2021. Patients next scheduled appointment No future appointments. Informed patient must be seen every 3 months due to pain medications and she stated she had an appt today but had to cancel due to Crohn's flare up rescheduled for 10-.

## 2021-09-21 RX ORDER — LORAZEPAM 0.5 MG/1
0.5 TABLET ORAL NIGHTLY
Qty: 30 TABLET | Refills: 2 | Status: SHIPPED
Start: 2021-09-21 | End: 2022-01-03 | Stop reason: SDUPTHER

## 2021-09-21 RX ORDER — HYDROCODONE BITARTRATE AND ACETAMINOPHEN 7.5; 325 MG/1; MG/1
1 TABLET ORAL 4 TIMES DAILY
Qty: 120 TABLET | Refills: 0 | Status: SHIPPED
Start: 2021-09-21 | End: 2021-10-18 | Stop reason: SDUPTHER

## 2021-10-01 ENCOUNTER — OFFICE VISIT (OUTPATIENT)
Dept: FAMILY MEDICINE CLINIC | Age: 76
End: 2021-10-01
Payer: MEDICARE

## 2021-10-01 VITALS
SYSTOLIC BLOOD PRESSURE: 124 MMHG | HEART RATE: 56 BPM | WEIGHT: 149 LBS | BODY MASS INDEX: 27.42 KG/M2 | TEMPERATURE: 97.1 F | DIASTOLIC BLOOD PRESSURE: 80 MMHG | OXYGEN SATURATION: 99 % | HEIGHT: 62 IN

## 2021-10-01 DIAGNOSIS — Z23 INFLUENZA VACCINE NEEDED: ICD-10-CM

## 2021-10-01 DIAGNOSIS — F33.0 MAJOR DEPRESSIVE DISORDER, RECURRENT, MILD (HCC): ICD-10-CM

## 2021-10-01 DIAGNOSIS — F33.1 MAJOR DEPRESSIVE DISORDER, RECURRENT, MODERATE (HCC): ICD-10-CM

## 2021-10-01 DIAGNOSIS — K50.90 CROHN'S DISEASE WITHOUT COMPLICATION, UNSPECIFIED GASTROINTESTINAL TRACT LOCATION (HCC): ICD-10-CM

## 2021-10-01 DIAGNOSIS — E78.49 OTHER HYPERLIPIDEMIA: ICD-10-CM

## 2021-10-01 DIAGNOSIS — D47.1 MYELOPROLIFERATIVE DISORDER (HCC): ICD-10-CM

## 2021-10-01 DIAGNOSIS — Z92.29 HISTORY OF ANTICOAGULANT THERAPY: ICD-10-CM

## 2021-10-01 DIAGNOSIS — F33.41 RECURRENT MAJOR DEPRESSIVE DISORDER, IN PARTIAL REMISSION (HCC): ICD-10-CM

## 2021-10-01 DIAGNOSIS — Z79.4 TYPE 2 DIABETES MELLITUS WITH DIABETIC NEPHROPATHY, WITH LONG-TERM CURRENT USE OF INSULIN (HCC): ICD-10-CM

## 2021-10-01 DIAGNOSIS — E11.21 TYPE 2 DIABETES MELLITUS WITH DIABETIC NEPHROPATHY, WITH LONG-TERM CURRENT USE OF INSULIN (HCC): ICD-10-CM

## 2021-10-01 DIAGNOSIS — J41.1 BRONCHITIS, MUCOPURULENT RECURRENT (HCC): ICD-10-CM

## 2021-10-01 DIAGNOSIS — J32.9 RECURRENT SINUSITIS: ICD-10-CM

## 2021-10-01 DIAGNOSIS — M54.9 CHRONIC BACK PAIN, UNSPECIFIED BACK LOCATION, UNSPECIFIED BACK PAIN LATERALITY: ICD-10-CM

## 2021-10-01 DIAGNOSIS — Z87.39 HISTORY OF REITER'S DISEASE: ICD-10-CM

## 2021-10-01 DIAGNOSIS — D64.9 FATIGUE ASSOCIATED WITH ANEMIA: ICD-10-CM

## 2021-10-01 DIAGNOSIS — Z87.19 HISTORY OF ULCERATIVE COLITIS: ICD-10-CM

## 2021-10-01 DIAGNOSIS — Z43.2 ATTENTION TO ILEOSTOMY (HCC): ICD-10-CM

## 2021-10-01 DIAGNOSIS — K50.119 CROHN'S COLITIS, UNSPECIFIED COMPLICATION (HCC): ICD-10-CM

## 2021-10-01 DIAGNOSIS — R05.3 CHRONIC COUGH: Primary | ICD-10-CM

## 2021-10-01 DIAGNOSIS — G89.29 CHRONIC BACK PAIN, UNSPECIFIED BACK LOCATION, UNSPECIFIED BACK PAIN LATERALITY: ICD-10-CM

## 2021-10-01 PROBLEM — F33.9 MAJOR DEPRESSIVE DISORDER, RECURRENT, UNSPECIFIED (HCC): Status: ACTIVE | Noted: 2021-10-01

## 2021-10-01 PROCEDURE — G8484 FLU IMMUNIZE NO ADMIN: HCPCS | Performed by: FAMILY MEDICINE

## 2021-10-01 PROCEDURE — G0008 ADMIN INFLUENZA VIRUS VAC: HCPCS | Performed by: FAMILY MEDICINE

## 2021-10-01 PROCEDURE — G8417 CALC BMI ABV UP PARAM F/U: HCPCS | Performed by: FAMILY MEDICINE

## 2021-10-01 PROCEDURE — 1090F PRES/ABSN URINE INCON ASSESS: CPT | Performed by: FAMILY MEDICINE

## 2021-10-01 PROCEDURE — G8427 DOCREV CUR MEDS BY ELIG CLIN: HCPCS | Performed by: FAMILY MEDICINE

## 2021-10-01 PROCEDURE — 4040F PNEUMOC VAC/ADMIN/RCVD: CPT | Performed by: FAMILY MEDICINE

## 2021-10-01 PROCEDURE — 1036F TOBACCO NON-USER: CPT | Performed by: FAMILY MEDICINE

## 2021-10-01 PROCEDURE — 1123F ACP DISCUSS/DSCN MKR DOCD: CPT | Performed by: FAMILY MEDICINE

## 2021-10-01 PROCEDURE — 99214 OFFICE O/P EST MOD 30 MIN: CPT | Performed by: FAMILY MEDICINE

## 2021-10-01 PROCEDURE — 90694 VACC AIIV4 NO PRSRV 0.5ML IM: CPT | Performed by: FAMILY MEDICINE

## 2021-10-01 PROCEDURE — 3023F SPIROM DOC REV: CPT | Performed by: FAMILY MEDICINE

## 2021-10-01 PROCEDURE — G8926 SPIRO NO PERF OR DOC: HCPCS | Performed by: FAMILY MEDICINE

## 2021-10-01 PROCEDURE — G8400 PT W/DXA NO RESULTS DOC: HCPCS | Performed by: FAMILY MEDICINE

## 2021-10-01 RX ORDER — OMEPRAZOLE 40 MG/1
40 CAPSULE, DELAYED RELEASE ORAL DAILY
Qty: 90 CAPSULE | Refills: 1 | Status: SHIPPED
Start: 2021-10-01 | End: 2022-04-20 | Stop reason: SDUPTHER

## 2021-10-01 RX ORDER — CITALOPRAM 40 MG/1
40 TABLET ORAL DAILY
Qty: 90 TABLET | Refills: 1 | Status: SHIPPED | OUTPATIENT
Start: 2021-10-01 | End: 2022-11-04

## 2021-10-01 RX ORDER — HYDROXYUREA 500 MG/1
500 CAPSULE ORAL DAILY
Qty: 90 CAPSULE | Refills: 1 | Status: SHIPPED
Start: 2021-10-01 | End: 2022-04-20 | Stop reason: SDUPTHER

## 2021-10-01 NOTE — PROGRESS NOTES
HENT:      Head: Normocephalic. Right Ear: Tympanic membrane, ear canal and external ear normal. There is no impacted cerumen. Left Ear: Tympanic membrane, ear canal and external ear normal. There is no impacted cerumen. Nose: No rhinorrhea. Mouth/Throat:      Pharynx: No posterior oropharyngeal erythema. Eyes:      General:         Right eye: No discharge. Left eye: No discharge. Extraocular Movements: Extraocular movements intact. Pupils: Pupils are equal, round, and reactive to light. Neck:      Vascular: No carotid bruit. Cardiovascular:      Rate and Rhythm: Regular rhythm. Heart sounds: Murmur (1/6 murmur ) heard. No gallop. Pulmonary:      Breath sounds: Normal breath sounds. No wheezing, rhonchi or rales. Abdominal:      Palpations: Abdomen is soft. There is no mass. Tenderness: There is no abdominal tenderness. There is no guarding or rebound. Hernia: No hernia is present. Comments: Ostomy wit hernia and some abdominal pain    Musculoskeletal:         General: Normal range of motion. Cervical back: Neck supple. Lymphadenopathy:      Cervical: No cervical adenopathy. Skin:     General: Skin is warm and dry. Findings: No bruising or rash. Neurological:      General: No focal deficit present. Mental Status: She is alert and oriented to person, place, and time. Cranial Nerves: No cranial nerve deficit. Motor: Weakness present. Coordination: Coordination normal.      Gait: Gait abnormal.   Psychiatric:         Mood and Affect: Mood normal.         Behavior: Behavior normal.         Assessment and Plan:  Jonas Julian was seen today for medication refill, depression and fatigue. Diagnoses and all orders for this visit:    Chronic cough  -     XR CHEST (2 VW); Future    Bronchitis, mucopurulent recurrent (HCC)  -     XR CHEST (2 VW); Future    Recurrent sinusitis  -     XR SINUSES (MIN 3 VIEWS );  Future    Major

## 2021-10-10 ASSESSMENT — ENCOUNTER SYMPTOMS
SHORTNESS OF BREATH: 0
RECTAL PAIN: 0
SORE THROAT: 0
RHINORRHEA: 0
BACK PAIN: 1
ABDOMINAL DISTENTION: 0
PHOTOPHOBIA: 0
COUGH: 0
FACIAL SWELLING: 0
CHEST TIGHTNESS: 0
CONSTIPATION: 0
VOICE CHANGE: 0
DIARRHEA: 0
APNEA: 0
VOMITING: 0
SINUS PAIN: 0
WHEEZING: 0
NAUSEA: 0
ABDOMINAL PAIN: 1

## 2021-10-18 ENCOUNTER — TELEPHONE (OUTPATIENT)
Dept: FAMILY MEDICINE CLINIC | Age: 76
End: 2021-10-18

## 2021-10-18 DIAGNOSIS — M54.42 CHRONIC MIDLINE LOW BACK PAIN WITH LEFT-SIDED SCIATICA: ICD-10-CM

## 2021-10-18 DIAGNOSIS — G89.29 CHRONIC MIDLINE LOW BACK PAIN WITH LEFT-SIDED SCIATICA: ICD-10-CM

## 2021-10-18 RX ORDER — HYDROCODONE BITARTRATE AND ACETAMINOPHEN 7.5; 325 MG/1; MG/1
1 TABLET ORAL 4 TIMES DAILY
Qty: 120 TABLET | Refills: 0 | Status: SHIPPED
Start: 2021-10-18 | End: 2021-11-16 | Stop reason: SDUPTHER

## 2021-10-18 NOTE — TELEPHONE ENCOUNTER
Patients last appointment 10/1/2021.   Patients next scheduled appointment   Future Appointments   Date Time Provider Brent Rueda   11/1/2021  1:00 PM DO TEA [facility]

## 2021-11-04 RX ORDER — METOPROLOL TARTRATE 50 MG/1
50 TABLET, FILM COATED ORAL DAILY
Qty: 90 TABLET | Refills: 1 | Status: SHIPPED
Start: 2021-11-04 | End: 2022-05-12 | Stop reason: SDUPTHER

## 2021-11-16 DIAGNOSIS — G89.29 CHRONIC MIDLINE LOW BACK PAIN WITH LEFT-SIDED SCIATICA: ICD-10-CM

## 2021-11-16 DIAGNOSIS — M54.42 CHRONIC MIDLINE LOW BACK PAIN WITH LEFT-SIDED SCIATICA: ICD-10-CM

## 2021-11-16 RX ORDER — HYDROCODONE BITARTRATE AND ACETAMINOPHEN 7.5; 325 MG/1; MG/1
1 TABLET ORAL 4 TIMES DAILY
Qty: 120 TABLET | Refills: 0 | Status: SHIPPED
Start: 2021-11-16 | End: 2021-12-13 | Stop reason: SDUPTHER

## 2021-11-23 DIAGNOSIS — F51.01 PRIMARY INSOMNIA: ICD-10-CM

## 2021-11-23 RX ORDER — ZOLPIDEM TARTRATE 5 MG/1
5 TABLET ORAL NIGHTLY
Qty: 30 TABLET | Refills: 2 | Status: SHIPPED
Start: 2021-11-23 | End: 2022-02-24 | Stop reason: SDUPTHER

## 2021-11-26 ENCOUNTER — TELEPHONE (OUTPATIENT)
Dept: FAMILY MEDICINE CLINIC | Age: 76
End: 2021-11-26

## 2021-12-13 ENCOUNTER — OFFICE VISIT (OUTPATIENT)
Dept: FAMILY MEDICINE CLINIC | Age: 76
End: 2021-12-13
Payer: MEDICARE

## 2021-12-13 VITALS
HEIGHT: 62 IN | OXYGEN SATURATION: 97 % | RESPIRATION RATE: 18 BRPM | WEIGHT: 148.5 LBS | TEMPERATURE: 96.8 F | HEART RATE: 97 BPM | DIASTOLIC BLOOD PRESSURE: 72 MMHG | BODY MASS INDEX: 27.33 KG/M2 | SYSTOLIC BLOOD PRESSURE: 118 MMHG

## 2021-12-13 DIAGNOSIS — D64.9 FATIGUE ASSOCIATED WITH ANEMIA: ICD-10-CM

## 2021-12-13 DIAGNOSIS — I15.9 SECONDARY HYPERTENSION: ICD-10-CM

## 2021-12-13 DIAGNOSIS — I25.119 CORONARY ARTERY DISEASE INVOLVING NATIVE CORONARY ARTERY WITH ANGINA PECTORIS, UNSPECIFIED WHETHER NATIVE OR TRANSPLANTED HEART (HCC): ICD-10-CM

## 2021-12-13 DIAGNOSIS — Z43.2 ATTENTION TO ILEOSTOMY (HCC): ICD-10-CM

## 2021-12-13 DIAGNOSIS — D47.1 MYELOPROLIFERATIVE DISORDER (HCC): ICD-10-CM

## 2021-12-13 DIAGNOSIS — F51.01 PRIMARY INSOMNIA: Primary | ICD-10-CM

## 2021-12-13 DIAGNOSIS — M54.42 CHRONIC MIDLINE LOW BACK PAIN WITH LEFT-SIDED SCIATICA: ICD-10-CM

## 2021-12-13 DIAGNOSIS — G89.29 CHRONIC MIDLINE LOW BACK PAIN WITH LEFT-SIDED SCIATICA: ICD-10-CM

## 2021-12-13 DIAGNOSIS — K50.119 CROHN'S COLITIS, UNSPECIFIED COMPLICATION (HCC): ICD-10-CM

## 2021-12-13 DIAGNOSIS — R53.83 OTHER FATIGUE: ICD-10-CM

## 2021-12-13 DIAGNOSIS — F33.0 MAJOR DEPRESSIVE DISORDER, RECURRENT, MILD (HCC): ICD-10-CM

## 2021-12-13 DIAGNOSIS — R42 LIGHTHEADED: ICD-10-CM

## 2021-12-13 DIAGNOSIS — R05.3 CHRONIC COUGH: ICD-10-CM

## 2021-12-13 PROCEDURE — 1123F ACP DISCUSS/DSCN MKR DOCD: CPT | Performed by: FAMILY MEDICINE

## 2021-12-13 PROCEDURE — G8427 DOCREV CUR MEDS BY ELIG CLIN: HCPCS | Performed by: FAMILY MEDICINE

## 2021-12-13 PROCEDURE — 4040F PNEUMOC VAC/ADMIN/RCVD: CPT | Performed by: FAMILY MEDICINE

## 2021-12-13 PROCEDURE — 1090F PRES/ABSN URINE INCON ASSESS: CPT | Performed by: FAMILY MEDICINE

## 2021-12-13 PROCEDURE — 36415 COLL VENOUS BLD VENIPUNCTURE: CPT | Performed by: FAMILY MEDICINE

## 2021-12-13 PROCEDURE — 1036F TOBACCO NON-USER: CPT | Performed by: FAMILY MEDICINE

## 2021-12-13 PROCEDURE — 99214 OFFICE O/P EST MOD 30 MIN: CPT | Performed by: FAMILY MEDICINE

## 2021-12-13 PROCEDURE — G8417 CALC BMI ABV UP PARAM F/U: HCPCS | Performed by: FAMILY MEDICINE

## 2021-12-13 PROCEDURE — G8484 FLU IMMUNIZE NO ADMIN: HCPCS | Performed by: FAMILY MEDICINE

## 2021-12-13 PROCEDURE — G8400 PT W/DXA NO RESULTS DOC: HCPCS | Performed by: FAMILY MEDICINE

## 2021-12-13 RX ORDER — LORAZEPAM 0.5 MG/1
0.5 TABLET ORAL DAILY PRN
COMMUNITY
Start: 2021-10-22 | End: 2022-02-17 | Stop reason: SDUPTHER

## 2021-12-13 RX ORDER — HYDROCODONE BITARTRATE AND ACETAMINOPHEN 7.5; 325 MG/1; MG/1
1 TABLET ORAL 4 TIMES DAILY
Qty: 120 TABLET | Refills: 0 | Status: SHIPPED
Start: 2021-12-13 | End: 2022-01-10 | Stop reason: SDUPTHER

## 2021-12-13 NOTE — PROGRESS NOTES
21  Sundar Goldberg : 1945 Sex: female  Age: 64 y.o. Chief Complaint   Patient presents with    Medication Check     f/u for celexa, making pt sleepy wants to discuss dosage    Other     pt wants you to know that she fell last week while putting on her shoes, she hit the door frame, pt had to call daughter to get her up off the floor, pt has a bruise on her left arm and states that her back is sore       Overall situation checkup back pain as well as possibly medication problems with Celexa      Review of Systems   Constitutional: Negative for appetite change, chills, diaphoresis, fatigue, fever and unexpected weight change. HENT: Positive for sinus pressure and sinus pain (on and off ). Negative for congestion, dental problem, ear discharge, ear pain, facial swelling, hearing loss, mouth sores, rhinorrhea, sore throat, tinnitus and voice change. Eyes: Negative for photophobia and visual disturbance (no visual changes). Respiratory: Positive for shortness of breath (on occasion ). Negative for apnea, cough, chest tightness and wheezing. Cardiovascular: Negative for chest pain and palpitations. Gastrointestinal: Positive for abdominal pain (ostomy  overall tenderness and pain in abdomen ). Negative for abdominal distention, constipation, diarrhea, nausea, rectal pain and vomiting. Endocrine: Negative for cold intolerance, heat intolerance and polyuria. Genitourinary: Positive for flank pain (on occasion ). Negative for dysuria, frequency, hematuria and urgency. Musculoskeletal: Positive for arthralgias, back pain (s since she had a fall) and myalgias. Negative for gait problem, joint swelling, neck pain and neck stiffness. Skin: Negative for rash and wound. Allergic/Immunologic: Negative for environmental allergies. Neurological: Positive for light-headedness (o on occasion).  Negative for dizziness, tremors, seizures, syncope, facial asymmetry, speech difficulty, weakness, numbness and headaches. Hematological: Does not bruise/bleed easily. Psychiatric/Behavioral: Negative for agitation, hallucinations and suicidal ideas. The patient is not nervous/anxious. Physical Exam  Vitals reviewed. Constitutional:       General: She is not in acute distress. Appearance: She is ill-appearing. HENT:      Head: Normocephalic. Right Ear: Tympanic membrane, ear canal and external ear normal. There is no impacted cerumen. Left Ear: Tympanic membrane, ear canal and external ear normal. There is no impacted cerumen. Nose: No rhinorrhea. Mouth/Throat:      Pharynx: No posterior oropharyngeal erythema. Eyes:      General:         Right eye: No discharge. Left eye: No discharge. Extraocular Movements: Extraocular movements intact. Pupils: Pupils are equal, round, and reactive to light. Neck:      Vascular: No carotid bruit. Cardiovascular:      Rate and Rhythm: Regular rhythm. Heart sounds: Murmur (1/6 murmur ) heard. No gallop. Pulmonary:      Breath sounds: Normal breath sounds. No wheezing, rhonchi or rales. Abdominal:      Palpations: Abdomen is soft. There is no mass. Tenderness: There is no abdominal tenderness. There is no guarding or rebound. Hernia: No hernia is present. Comments: Ostomy wit hernia and some abdominal pain    Musculoskeletal:         General: Normal range of motion. Cervical back: Neck supple. Comments: Back pain and tenderness in the low back and sacral area   Lymphadenopathy:      Cervical: No cervical adenopathy. Skin:     General: Skin is warm and dry. Findings: No bruising or rash. Neurological:      General: No focal deficit present. Mental Status: She is alert and oriented to person, place, and time. Cranial Nerves: No cranial nerve deficit. Motor: Weakness present.       Coordination: Coordination normal.      Gait: Gait abnormal.   Psychiatric: Mood and Affect: Mood normal.         Behavior: Behavior normal.         Assessment and Plan:  Angelique Castrejon was seen today for medication check and other. Diagnoses and all orders for this visit:    Primary insomnia  -     CBC Auto Differential; Future  -     Comprehensive Metabolic Panel; Future    Chronic midline low back pain with left-sided sciatica  -     HYDROcodone-acetaminophen (NORCO) 7.5-325 MG per tablet; Take 1 tablet by mouth 4 times daily for 30 days. -     CBC Auto Differential; Future  -     Comprehensive Metabolic Panel; Future    Other fatigue  -     CBC Auto Differential; Future  -     Comprehensive Metabolic Panel; Future    Lightheaded  -     CBC Auto Differential; Future  -     Comprehensive Metabolic Panel; Future    Secondary hypertension    Major depressive disorder, recurrent, mild    Attention to ileostomy ([facility] [address]    Crohn's colitis, unspecified complication ([facility] [address]    Myeloproliferative disorder ([facility] [address]    Coronary artery disease involving native coronary artery with angina pectoris, unspecified whether native or transplanted heart ([facility] [address]    Fatigue associated with anemia    Chronic cough          Discussions/Education provided to patients during visit:  [] Discussed the importance to stop smoking. [] Advised to monitor eating habits. [] Reviewed and discussed Imaging results. [x] Reviewed and discussed Lab results. [x] Discussed the importance of drinking plenty of fluids. [x] Cut down on Salt, Caffeine, and Sugar. [x] Continue Medications as Discussed. [x] Communicated with patient any concerns, to phone office. Cut  celexa in half   Ativan is when needed   Concern on meds and the combination of meds   Draw blood today   Consider x-rays of back and low back situations hips in the future    No follow-ups on file.       Seen by:     Katie Ramirez DO

## 2021-12-14 LAB
ALBUMIN SERPL-MCNC: 4.4 G/DL (ref 3.5–5.2)
ALP BLD-CCNC: 91 U/L (ref 35–104)
ALT SERPL-CCNC: 15 U/L (ref 0–32)
ANION GAP SERPL CALCULATED.3IONS-SCNC: 10 MMOL/L (ref 7–16)
ANISOCYTOSIS: ABNORMAL
AST SERPL-CCNC: 21 U/L (ref 0–31)
BASOPHILS ABSOLUTE: 0.05 E9/L (ref 0–0.2)
BASOPHILS RELATIVE PERCENT: 0.7 % (ref 0–2)
BILIRUB SERPL-MCNC: 0.3 MG/DL (ref 0–1.2)
BUN BLDV-MCNC: 16 MG/DL (ref 6–23)
CALCIUM SERPL-MCNC: 9.7 MG/DL (ref 8.6–10.2)
CHLORIDE BLD-SCNC: 101 MMOL/L (ref 98–107)
CO2: 26 MMOL/L (ref 22–29)
CREAT SERPL-MCNC: 0.7 MG/DL (ref 0.5–1)
EOSINOPHILS ABSOLUTE: 0.16 E9/L (ref 0.05–0.5)
EOSINOPHILS RELATIVE PERCENT: 2.2 % (ref 0–6)
GFR AFRICAN AMERICAN: >60
GFR NON-AFRICAN AMERICAN: >60 ML/MIN/1.73
GLUCOSE BLD-MCNC: 111 MG/DL (ref 74–99)
HCT VFR BLD CALC: 37.4 % (ref 34–48)
HEMOGLOBIN: 12.4 G/DL (ref 11.5–15.5)
IMMATURE GRANULOCYTES #: 0.07 E9/L
IMMATURE GRANULOCYTES %: 1 % (ref 0–5)
LYMPHOCYTES ABSOLUTE: 1.52 E9/L (ref 1.5–4)
LYMPHOCYTES RELATIVE PERCENT: 21.3 % (ref 20–42)
MCH RBC QN AUTO: 38 PG (ref 26–35)
MCHC RBC AUTO-ENTMCNC: 33.2 % (ref 32–34.5)
MCV RBC AUTO: 114.7 FL (ref 80–99.9)
MONOCYTES ABSOLUTE: 0.6 E9/L (ref 0.1–0.95)
MONOCYTES RELATIVE PERCENT: 8.4 % (ref 2–12)
NEUTROPHILS ABSOLUTE: 4.73 E9/L (ref 1.8–7.3)
NEUTROPHILS RELATIVE PERCENT: 66.4 % (ref 43–80)
OVALOCYTES: ABNORMAL
PDW BLD-RTO: 15.7 FL (ref 11.5–15)
PLATELET # BLD: 347 E9/L (ref 130–450)
PMV BLD AUTO: 10.4 FL (ref 7–12)
POIKILOCYTES: ABNORMAL
POLYCHROMASIA: ABNORMAL
POTASSIUM SERPL-SCNC: 5.1 MMOL/L (ref 3.5–5)
RBC # BLD: 3.26 E12/L (ref 3.5–5.5)
SCHISTOCYTES: ABNORMAL
SODIUM BLD-SCNC: 137 MMOL/L (ref 132–146)
STOMATOCYTES: ABNORMAL
TOTAL PROTEIN: 6.8 G/DL (ref 6.4–8.3)
WBC # BLD: 7.1 E9/L (ref 4.5–11.5)

## 2021-12-22 ENCOUNTER — TELEPHONE (OUTPATIENT)
Dept: FAMILY MEDICINE CLINIC | Age: 76
End: 2021-12-22

## 2021-12-22 NOTE — TELEPHONE ENCOUNTER
Spoke with patient and she is having blood work done at Dr. Claire Salas office the first of the year. Informed patient I will fax our orders to her office.

## 2022-01-02 ASSESSMENT — ENCOUNTER SYMPTOMS
SHORTNESS OF BREATH: 1
RHINORRHEA: 0
APNEA: 0
COUGH: 0
CONSTIPATION: 0
FACIAL SWELLING: 0
WHEEZING: 0
RECTAL PAIN: 0
DIARRHEA: 0
CHEST TIGHTNESS: 0
PHOTOPHOBIA: 0
ABDOMINAL PAIN: 1
VOICE CHANGE: 0
SINUS PAIN: 1
ABDOMINAL DISTENTION: 0
NAUSEA: 0
SINUS PRESSURE: 1
SORE THROAT: 0
BACK PAIN: 1
VOMITING: 0

## 2022-01-03 DIAGNOSIS — M54.42 CHRONIC MIDLINE LOW BACK PAIN WITH LEFT-SIDED SCIATICA: ICD-10-CM

## 2022-01-03 DIAGNOSIS — G89.29 CHRONIC MIDLINE LOW BACK PAIN WITH LEFT-SIDED SCIATICA: ICD-10-CM

## 2022-01-03 RX ORDER — LORAZEPAM 0.5 MG/1
0.5 TABLET ORAL NIGHTLY
Qty: 30 TABLET | Refills: 2 | Status: SHIPPED | OUTPATIENT
Start: 2022-01-03 | End: 2022-02-02

## 2022-01-10 DIAGNOSIS — G89.29 CHRONIC MIDLINE LOW BACK PAIN WITH LEFT-SIDED SCIATICA: ICD-10-CM

## 2022-01-10 DIAGNOSIS — M54.42 CHRONIC MIDLINE LOW BACK PAIN WITH LEFT-SIDED SCIATICA: ICD-10-CM

## 2022-01-10 RX ORDER — DICYCLOMINE HCL 20 MG
20 TABLET ORAL 2 TIMES DAILY
Qty: 60 TABLET | Refills: 5 | Status: SHIPPED
Start: 2022-01-10 | End: 2022-01-12 | Stop reason: SDUPTHER

## 2022-01-10 RX ORDER — HYDROCODONE BITARTRATE AND ACETAMINOPHEN 7.5; 325 MG/1; MG/1
1 TABLET ORAL 4 TIMES DAILY
Qty: 120 TABLET | Refills: 0 | Status: SHIPPED
Start: 2022-01-10 | End: 2022-02-03 | Stop reason: SDUPTHER

## 2022-01-12 RX ORDER — DICYCLOMINE HCL 20 MG
20 TABLET ORAL 2 TIMES DAILY
Qty: 60 TABLET | Refills: 5 | Status: SHIPPED
Start: 2022-01-12 | End: 2022-05-09 | Stop reason: SDUPTHER

## 2022-02-03 DIAGNOSIS — G89.29 CHRONIC MIDLINE LOW BACK PAIN WITH LEFT-SIDED SCIATICA: ICD-10-CM

## 2022-02-03 DIAGNOSIS — M54.42 CHRONIC MIDLINE LOW BACK PAIN WITH LEFT-SIDED SCIATICA: ICD-10-CM

## 2022-02-03 RX ORDER — HYDROCODONE BITARTRATE AND ACETAMINOPHEN 7.5; 325 MG/1; MG/1
1 TABLET ORAL 4 TIMES DAILY
Qty: 120 TABLET | Refills: 0 | Status: SHIPPED
Start: 2022-02-03 | End: 2022-03-04 | Stop reason: SDUPTHER

## 2022-02-08 ENCOUNTER — TELEPHONE (OUTPATIENT)
Dept: FAMILY MEDICINE CLINIC | Age: 77
End: 2022-02-08

## 2022-02-08 NOTE — TELEPHONE ENCOUNTER
Spoke with patient and she stated that she gets her blood work done at Dr. Kenny Knowles office and she faxes the results to our office.

## 2022-02-17 DIAGNOSIS — F51.01 PRIMARY INSOMNIA: Primary | ICD-10-CM

## 2022-02-17 RX ORDER — FOLIC ACID 1 MG/1
1 TABLET ORAL DAILY
Qty: 90 TABLET | Refills: 1 | Status: SHIPPED
Start: 2022-02-17 | End: 2022-10-26 | Stop reason: SDUPTHER

## 2022-02-17 RX ORDER — SIMVASTATIN 40 MG
40 TABLET ORAL NIGHTLY
Qty: 90 TABLET | Refills: 1 | Status: SHIPPED
Start: 2022-02-17 | End: 2022-08-24 | Stop reason: SDUPTHER

## 2022-02-17 RX ORDER — LORAZEPAM 0.5 MG/1
0.5 TABLET ORAL DAILY PRN
Qty: 30 TABLET | Refills: 2 | Status: SHIPPED
Start: 2022-02-17 | End: 2022-07-08 | Stop reason: SDUPTHER

## 2022-02-24 DIAGNOSIS — F51.01 PRIMARY INSOMNIA: ICD-10-CM

## 2022-02-24 RX ORDER — ZOLPIDEM TARTRATE 5 MG/1
5 TABLET ORAL NIGHTLY
Qty: 30 TABLET | Refills: 2 | Status: SHIPPED
Start: 2022-02-24 | End: 2022-03-08

## 2022-03-04 DIAGNOSIS — G89.29 CHRONIC MIDLINE LOW BACK PAIN WITH LEFT-SIDED SCIATICA: ICD-10-CM

## 2022-03-04 DIAGNOSIS — M54.42 CHRONIC MIDLINE LOW BACK PAIN WITH LEFT-SIDED SCIATICA: ICD-10-CM

## 2022-03-04 RX ORDER — HYDROCODONE BITARTRATE AND ACETAMINOPHEN 7.5; 325 MG/1; MG/1
1 TABLET ORAL 4 TIMES DAILY
Qty: 120 TABLET | Refills: 0 | Status: SHIPPED
Start: 2022-03-04 | End: 2022-03-28 | Stop reason: SDUPTHER

## 2022-03-08 DIAGNOSIS — F51.01 PRIMARY INSOMNIA: ICD-10-CM

## 2022-03-08 NOTE — TELEPHONE ENCOUNTER
Patients last appointment 12/13/2021. Patients next scheduled appointment No future appointments. Patient informed that [facility] has refills and to check with them.

## 2022-03-09 RX ORDER — ZOLPIDEM TARTRATE 5 MG/1
TABLET ORAL
Qty: 30 TABLET | Refills: 2 | Status: SHIPPED
Start: 2022-03-09 | End: 2022-05-26 | Stop reason: SDUPTHER

## 2022-03-28 DIAGNOSIS — G89.29 CHRONIC MIDLINE LOW BACK PAIN WITH LEFT-SIDED SCIATICA: ICD-10-CM

## 2022-03-28 DIAGNOSIS — M54.42 CHRONIC MIDLINE LOW BACK PAIN WITH LEFT-SIDED SCIATICA: ICD-10-CM

## 2022-03-28 RX ORDER — HYDROCODONE BITARTRATE AND ACETAMINOPHEN 7.5; 325 MG/1; MG/1
1 TABLET ORAL 4 TIMES DAILY
Qty: 120 TABLET | Refills: 0 | Status: SHIPPED
Start: 2022-03-28 | End: 2022-04-25 | Stop reason: SDUPTHER

## 2022-03-28 NOTE — TELEPHONE ENCOUNTER
Patients last appointment 12/13/2021. Patients next scheduled appointment No future appointments. Informed patient needs seen soon call transferred to [facility] to schedule appt.

## 2022-04-20 RX ORDER — OMEPRAZOLE 40 MG/1
40 CAPSULE, DELAYED RELEASE ORAL DAILY
Qty: 90 CAPSULE | Refills: 1 | Status: SHIPPED | OUTPATIENT
Start: 2022-04-20 | End: 2022-11-04

## 2022-04-20 RX ORDER — HYDROXYUREA 500 MG/1
500 CAPSULE ORAL DAILY
Qty: 90 CAPSULE | Refills: 1 | Status: SHIPPED | OUTPATIENT
Start: 2022-04-20 | End: 2022-10-17

## 2022-04-25 ENCOUNTER — OFFICE VISIT (OUTPATIENT)
Dept: FAMILY MEDICINE CLINIC | Age: 77
End: 2022-04-25
Payer: MEDICARE

## 2022-04-25 VITALS
BODY MASS INDEX: 25.4 KG/M2 | OXYGEN SATURATION: 100 % | TEMPERATURE: 96.8 F | HEIGHT: 62 IN | HEART RATE: 51 BPM | WEIGHT: 138 LBS | DIASTOLIC BLOOD PRESSURE: 78 MMHG | SYSTOLIC BLOOD PRESSURE: 126 MMHG

## 2022-04-25 DIAGNOSIS — D62 ACUTE BLOOD LOSS ANEMIA: ICD-10-CM

## 2022-04-25 DIAGNOSIS — I25.119 CORONARY ARTERY DISEASE INVOLVING NATIVE CORONARY ARTERY WITH ANGINA PECTORIS, UNSPECIFIED WHETHER NATIVE OR TRANSPLANTED HEART (HCC): Primary | ICD-10-CM

## 2022-04-25 DIAGNOSIS — D64.9 FATIGUE ASSOCIATED WITH ANEMIA: ICD-10-CM

## 2022-04-25 DIAGNOSIS — E11.21 TYPE 2 DIABETES MELLITUS WITH DIABETIC NEPHROPATHY, WITH LONG-TERM CURRENT USE OF INSULIN (HCC): ICD-10-CM

## 2022-04-25 DIAGNOSIS — M54.42 CHRONIC MIDLINE LOW BACK PAIN WITH LEFT-SIDED SCIATICA: ICD-10-CM

## 2022-04-25 DIAGNOSIS — Z79.4 TYPE 2 DIABETES MELLITUS WITH DIABETIC NEPHROPATHY, WITH LONG-TERM CURRENT USE OF INSULIN (HCC): ICD-10-CM

## 2022-04-25 DIAGNOSIS — K62.4 ANAL STRICTURE: ICD-10-CM

## 2022-04-25 DIAGNOSIS — G89.29 CHRONIC MIDLINE LOW BACK PAIN WITH LEFT-SIDED SCIATICA: ICD-10-CM

## 2022-04-25 DIAGNOSIS — K50.119 CROHN'S COLITIS, UNSPECIFIED COMPLICATION (HCC): ICD-10-CM

## 2022-04-25 PROCEDURE — 4040F PNEUMOC VAC/ADMIN/RCVD: CPT | Performed by: FAMILY MEDICINE

## 2022-04-25 PROCEDURE — 1090F PRES/ABSN URINE INCON ASSESS: CPT | Performed by: FAMILY MEDICINE

## 2022-04-25 PROCEDURE — 1036F TOBACCO NON-USER: CPT | Performed by: FAMILY MEDICINE

## 2022-04-25 PROCEDURE — G8427 DOCREV CUR MEDS BY ELIG CLIN: HCPCS | Performed by: FAMILY MEDICINE

## 2022-04-25 PROCEDURE — 99214 OFFICE O/P EST MOD 30 MIN: CPT | Performed by: FAMILY MEDICINE

## 2022-04-25 PROCEDURE — G8417 CALC BMI ABV UP PARAM F/U: HCPCS | Performed by: FAMILY MEDICINE

## 2022-04-25 PROCEDURE — 1123F ACP DISCUSS/DSCN MKR DOCD: CPT | Performed by: FAMILY MEDICINE

## 2022-04-25 PROCEDURE — G8400 PT W/DXA NO RESULTS DOC: HCPCS | Performed by: FAMILY MEDICINE

## 2022-04-25 RX ORDER — MIRTAZAPINE 7.5 MG/1
7.5 TABLET, FILM COATED ORAL NIGHTLY
Qty: 30 TABLET | Refills: 1 | Status: SHIPPED
Start: 2022-04-25 | End: 2022-07-26

## 2022-04-25 RX ORDER — HYDROCODONE BITARTRATE AND ACETAMINOPHEN 7.5; 325 MG/1; MG/1
1 TABLET ORAL 4 TIMES DAILY
Qty: 120 TABLET | Refills: 0 | Status: SHIPPED
Start: 2022-04-25 | End: 2022-05-23 | Stop reason: SDUPTHER

## 2022-04-25 RX ORDER — DOXYCYCLINE HYCLATE 100 MG
100 TABLET ORAL 2 TIMES DAILY
Qty: 20 TABLET | Refills: 0 | Status: SHIPPED | OUTPATIENT
Start: 2022-04-25 | End: 2022-05-05

## 2022-04-25 RX ORDER — CHOLESTYRAMINE 4 G/9G
1 POWDER, FOR SUSPENSION ORAL DAILY
COMMUNITY
Start: 2022-03-10

## 2022-04-25 SDOH — ECONOMIC STABILITY: FOOD INSECURITY: WITHIN THE PAST 12 MONTHS, THE FOOD YOU BOUGHT JUST DIDN'T LAST AND YOU DIDN'T HAVE MONEY TO GET MORE.: NEVER TRUE

## 2022-04-25 SDOH — ECONOMIC STABILITY: FOOD INSECURITY: WITHIN THE PAST 12 MONTHS, YOU WORRIED THAT YOUR FOOD WOULD RUN OUT BEFORE YOU GOT MONEY TO BUY MORE.: NEVER TRUE

## 2022-04-25 ASSESSMENT — SOCIAL DETERMINANTS OF HEALTH (SDOH): HOW HARD IS IT FOR YOU TO PAY FOR THE VERY BASICS LIKE FOOD, HOUSING, MEDICAL CARE, AND HEATING?: NOT HARD AT ALL

## 2022-04-25 NOTE — PROGRESS NOTES
for agitation, hallucinations and suicidal ideas. The patient is not nervous/anxious. Physical Exam  Vitals reviewed. Constitutional:       General: She is not in acute distress. Appearance: She is ill-appearing. HENT:      Head: Normocephalic. Right Ear: Tympanic membrane, ear canal and external ear normal. There is no impacted cerumen. Left Ear: Tympanic membrane, ear canal and external ear normal. There is no impacted cerumen. Nose: No rhinorrhea. Mouth/Throat:      Pharynx: No posterior oropharyngeal erythema. Eyes:      General:         Right eye: No discharge. Left eye: No discharge. Extraocular Movements: Extraocular movements intact. Pupils: Pupils are equal, round, and reactive to light. Neck:      Vascular: No carotid bruit. Cardiovascular:      Rate and Rhythm: Regular rhythm. Heart sounds: Murmur (1/6 murmur ) heard. No gallop. Pulmonary:      Breath sounds: Wheezing and rhonchi present. No rales. Abdominal:      Palpations: Abdomen is soft. There is no mass. Tenderness: There is no abdominal tenderness. There is no guarding or rebound. Hernia: No hernia is present. Comments: Ostomy wit hernia and some abdominal pain    Musculoskeletal:         General: Normal range of motion. Cervical back: Neck supple. Comments: Back pain and tenderness in the low back and sacral area   Lymphadenopathy:      Cervical: No cervical adenopathy. Skin:     General: Skin is warm and dry. Findings: No bruising or rash. Neurological:      General: No focal deficit present. Mental Status: She is alert and oriented to person, place, and time. Cranial Nerves: No cranial nerve deficit. Motor: Weakness present.       Coordination: Coordination normal.      Gait: Gait abnormal.   Psychiatric:         Mood and Affect: Mood normal.         Behavior: Behavior normal.         Assessment and Plan:  Bob Lofton was seen today

## 2022-04-27 ENCOUNTER — TELEPHONE (OUTPATIENT)
Dept: FAMILY MEDICINE CLINIC | Age: 77
End: 2022-04-27

## 2022-04-27 NOTE — TELEPHONE ENCOUNTER
Per Dr. Destinee Ray instructions, patient was informed that she has small gallstones, but everything else on the CT was normal.

## 2022-04-29 ENCOUNTER — TELEPHONE (OUTPATIENT)
Dept: FAMILY MEDICINE CLINIC | Age: 77
End: 2022-04-29

## 2022-04-29 RX ORDER — AMOXICILLIN 500 MG/1
500 CAPSULE ORAL 3 TIMES DAILY
Qty: 21 CAPSULE | Refills: 0 | Status: SHIPPED | OUTPATIENT
Start: 2022-04-29 | End: 2022-05-06

## 2022-04-29 NOTE — TELEPHONE ENCOUNTER
Given Doxycycline Monday, she gets nauseated every time she takes it. She would like something else please. Pharmacy is [facility]. Pt. Has 6 allergies.

## 2022-05-09 RX ORDER — DICYCLOMINE HCL 20 MG
20 TABLET ORAL 2 TIMES DAILY
Qty: 60 TABLET | Refills: 5 | Status: ON HOLD
Start: 2022-05-09 | End: 2022-09-03 | Stop reason: SDUPTHER

## 2022-05-09 NOTE — TELEPHONE ENCOUNTER
Patients last appointment 4/25/2022.   Patients next scheduled appointment   Future Appointments   Date Time Provider Brent Rueda   5/23/2022 11:00 AM Jeanette Sever, DO [facility]

## 2022-05-12 RX ORDER — METOPROLOL TARTRATE 50 MG/1
50 TABLET, FILM COATED ORAL DAILY
Qty: 90 TABLET | Refills: 1 | Status: SHIPPED | OUTPATIENT
Start: 2022-05-12

## 2022-05-16 ASSESSMENT — ENCOUNTER SYMPTOMS
SORE THROAT: 0
APNEA: 0
RHINORRHEA: 0
RECTAL PAIN: 0
DIARRHEA: 0
FACIAL SWELLING: 0
SINUS PAIN: 1
SINUS PRESSURE: 1
WHEEZING: 1
PHOTOPHOBIA: 0
VOICE CHANGE: 0
BACK PAIN: 1
VOMITING: 0
SHORTNESS OF BREATH: 1
ABDOMINAL PAIN: 1
ABDOMINAL DISTENTION: 0
COUGH: 1
CONSTIPATION: 0
CHEST TIGHTNESS: 1
NAUSEA: 0

## 2022-05-23 DIAGNOSIS — M54.42 CHRONIC MIDLINE LOW BACK PAIN WITH LEFT-SIDED SCIATICA: ICD-10-CM

## 2022-05-23 DIAGNOSIS — G89.29 CHRONIC MIDLINE LOW BACK PAIN WITH LEFT-SIDED SCIATICA: ICD-10-CM

## 2022-05-23 RX ORDER — HYDROCODONE BITARTRATE AND ACETAMINOPHEN 7.5; 325 MG/1; MG/1
1 TABLET ORAL 4 TIMES DAILY
Qty: 120 TABLET | Refills: 0 | Status: SHIPPED
Start: 2022-05-23 | End: 2022-06-21 | Stop reason: SDUPTHER

## 2022-05-23 NOTE — TELEPHONE ENCOUNTER
----- Message from Kimberley Brittle sent at 5/23/2022 10:30 AM EDT -----  Subject: Refill Request    QUESTIONS  Name of Medication? HYDROcodone-acetaminophen (NORCO) 7.5-325 MG per   tablet  Patient-reported dosage and instructions? every 6 hours   How many days do you have left? 7  Preferred Pharmacy? [address] phone number (if available)? [phone]  ---------------------------------------------------------------------------  --------------  CALL BACK INFO  What is the best way for the office to contact you? OK to leave message on   voicemail  Preferred Call Back Phone Number? [phone]  ---------------------------------------------------------------------------  --------------  SCRIPT ANSWERS  Relationship to Patient?  Self

## 2022-05-26 DIAGNOSIS — F51.01 PRIMARY INSOMNIA: ICD-10-CM

## 2022-05-26 RX ORDER — ZOLPIDEM TARTRATE 5 MG/1
TABLET ORAL
Qty: 30 TABLET | Refills: 2 | Status: SHIPPED
Start: 2022-05-26 | End: 2022-08-24 | Stop reason: SDUPTHER

## 2022-06-21 DIAGNOSIS — M54.42 CHRONIC MIDLINE LOW BACK PAIN WITH LEFT-SIDED SCIATICA: ICD-10-CM

## 2022-06-21 DIAGNOSIS — G89.29 CHRONIC MIDLINE LOW BACK PAIN WITH LEFT-SIDED SCIATICA: ICD-10-CM

## 2022-06-21 RX ORDER — HYDROCODONE BITARTRATE AND ACETAMINOPHEN 7.5; 325 MG/1; MG/1
1 TABLET ORAL 4 TIMES DAILY
Qty: 120 TABLET | Refills: 0 | Status: SHIPPED
Start: 2022-06-21 | End: 2022-07-14 | Stop reason: SDUPTHER

## 2022-07-08 DIAGNOSIS — F51.01 PRIMARY INSOMNIA: ICD-10-CM

## 2022-07-08 RX ORDER — LORAZEPAM 0.5 MG/1
0.5 TABLET ORAL DAILY PRN
Qty: 30 TABLET | Refills: 2 | Status: SHIPPED | OUTPATIENT
Start: 2022-07-08 | End: 2022-08-07

## 2022-07-14 DIAGNOSIS — G89.29 CHRONIC MIDLINE LOW BACK PAIN WITH LEFT-SIDED SCIATICA: ICD-10-CM

## 2022-07-14 DIAGNOSIS — M54.42 CHRONIC MIDLINE LOW BACK PAIN WITH LEFT-SIDED SCIATICA: ICD-10-CM

## 2022-07-14 RX ORDER — HYDROCODONE BITARTRATE AND ACETAMINOPHEN 7.5; 325 MG/1; MG/1
1 TABLET ORAL 4 TIMES DAILY
Qty: 120 TABLET | Refills: 0 | Status: SHIPPED
Start: 2022-07-14 | End: 2022-08-12 | Stop reason: SDUPTHER

## 2022-07-14 NOTE — TELEPHONE ENCOUNTER
----- Message from [place] LPN sent at 9/64/9755  3:01 PM EDT -----  Subject: Message to Provider    QUESTIONS  Information for Provider? Patient scheduled follow up for pain medication   for next available 7/25 her medication will run out and need to be filled   by 7/22 will she be able to get refill since appt is scheduled please call   and follow up with patient   ---------------------------------------------------------------------------  --------------  [id number]  [phone]; OK to leave message on voicemail  ---------------------------------------------------------------------------  --------------  SCRIPT ANSWERS  Relationship to Patient?  Self

## 2022-07-14 NOTE — TELEPHONE ENCOUNTER
Patients last appointment 4/25/2022. Patients next scheduled appointment   Future Appointments   Date Time Provider Brent Rueda   7/25/2022  1:00 PM DO [facility]      Patient scheduled her 3 month follow up for her pain medication, but she will run out by 7/22.

## 2022-07-19 DIAGNOSIS — G89.29 CHRONIC MIDLINE LOW BACK PAIN WITH LEFT-SIDED SCIATICA: ICD-10-CM

## 2022-07-19 DIAGNOSIS — M54.42 CHRONIC MIDLINE LOW BACK PAIN WITH LEFT-SIDED SCIATICA: ICD-10-CM

## 2022-07-19 RX ORDER — HYDROCODONE BITARTRATE AND ACETAMINOPHEN 7.5; 325 MG/1; MG/1
1 TABLET ORAL 4 TIMES DAILY
Qty: 120 TABLET | Refills: 0 | OUTPATIENT
Start: 2022-07-19 | End: 2022-08-18

## 2022-07-25 ENCOUNTER — OFFICE VISIT (OUTPATIENT)
Dept: FAMILY MEDICINE CLINIC | Age: 77
End: 2022-07-25
Payer: MEDICARE

## 2022-07-25 VITALS
HEIGHT: 62 IN | BODY MASS INDEX: 25.25 KG/M2 | DIASTOLIC BLOOD PRESSURE: 66 MMHG | SYSTOLIC BLOOD PRESSURE: 136 MMHG | TEMPERATURE: 97.6 F | OXYGEN SATURATION: 99 % | WEIGHT: 137.2 LBS | HEART RATE: 67 BPM | RESPIRATION RATE: 17 BRPM

## 2022-07-25 VITALS
BODY MASS INDEX: 25.25 KG/M2 | HEART RATE: 67 BPM | DIASTOLIC BLOOD PRESSURE: 66 MMHG | SYSTOLIC BLOOD PRESSURE: 136 MMHG | HEIGHT: 62 IN | WEIGHT: 137.2 LBS | TEMPERATURE: 97.3 F | OXYGEN SATURATION: 99 % | RESPIRATION RATE: 17 BRPM

## 2022-07-25 DIAGNOSIS — E43 SEVERE PROTEIN-CALORIE MALNUTRITION (HCC): ICD-10-CM

## 2022-07-25 DIAGNOSIS — Z43.2 ATTENTION TO ILEOSTOMY (HCC): ICD-10-CM

## 2022-07-25 DIAGNOSIS — R06.00 DYSPNEA, UNSPECIFIED TYPE: Primary | ICD-10-CM

## 2022-07-25 DIAGNOSIS — Z00.00 MEDICARE ANNUAL WELLNESS VISIT, SUBSEQUENT: Primary | ICD-10-CM

## 2022-07-25 DIAGNOSIS — D47.1 MYELOPROLIFERATIVE DISORDER (HCC): ICD-10-CM

## 2022-07-25 DIAGNOSIS — J41.1 BRONCHITIS, MUCOPURULENT RECURRENT (HCC): ICD-10-CM

## 2022-07-25 DIAGNOSIS — F33.0 MAJOR DEPRESSIVE DISORDER, RECURRENT, MILD (HCC): ICD-10-CM

## 2022-07-25 PROCEDURE — 1123F ACP DISCUSS/DSCN MKR DOCD: CPT | Performed by: FAMILY MEDICINE

## 2022-07-25 PROCEDURE — 1090F PRES/ABSN URINE INCON ASSESS: CPT | Performed by: FAMILY MEDICINE

## 2022-07-25 PROCEDURE — 99213 OFFICE O/P EST LOW 20 MIN: CPT | Performed by: FAMILY MEDICINE

## 2022-07-25 PROCEDURE — G8417 CALC BMI ABV UP PARAM F/U: HCPCS | Performed by: FAMILY MEDICINE

## 2022-07-25 PROCEDURE — G0439 PPPS, SUBSEQ VISIT: HCPCS | Performed by: FAMILY MEDICINE

## 2022-07-25 PROCEDURE — G8400 PT W/DXA NO RESULTS DOC: HCPCS | Performed by: FAMILY MEDICINE

## 2022-07-25 PROCEDURE — G8427 DOCREV CUR MEDS BY ELIG CLIN: HCPCS | Performed by: FAMILY MEDICINE

## 2022-07-25 PROCEDURE — 1036F TOBACCO NON-USER: CPT | Performed by: FAMILY MEDICINE

## 2022-07-25 PROCEDURE — 3023F SPIROM DOC REV: CPT | Performed by: FAMILY MEDICINE

## 2022-07-25 RX ORDER — ALBUTEROL SULFATE 90 UG/1
2 AEROSOL, METERED RESPIRATORY (INHALATION) EVERY 6 HOURS PRN
Qty: 18 G | Refills: 3 | Status: SHIPPED | OUTPATIENT
Start: 2022-07-25

## 2022-07-25 ASSESSMENT — PATIENT HEALTH QUESTIONNAIRE - PHQ9
10. IF YOU CHECKED OFF ANY PROBLEMS, HOW DIFFICULT HAVE THESE PROBLEMS MADE IT FOR YOU TO DO YOUR WORK, TAKE CARE OF THINGS AT HOME, OR GET ALONG WITH OTHER PEOPLE: 0
2. FEELING DOWN, DEPRESSED OR HOPELESS: 3
7. TROUBLE CONCENTRATING ON THINGS, SUCH AS READING THE NEWSPAPER OR WATCHING TELEVISION: 0
5. POOR APPETITE OR OVEREATING: 1
SUM OF ALL RESPONSES TO PHQ QUESTIONS 1-9: 14
4. FEELING TIRED OR HAVING LITTLE ENERGY: 2
10. IF YOU CHECKED OFF ANY PROBLEMS, HOW DIFFICULT HAVE THESE PROBLEMS MADE IT FOR YOU TO DO YOUR WORK, TAKE CARE OF THINGS AT HOME, OR GET ALONG WITH OTHER PEOPLE: 0
5. POOR APPETITE OR OVEREATING: 2
2. FEELING DOWN, DEPRESSED OR HOPELESS: 3
7. TROUBLE CONCENTRATING ON THINGS, SUCH AS READING THE NEWSPAPER OR WATCHING TELEVISION: 0
SUM OF ALL RESPONSES TO PHQ9 QUESTIONS 1 & 2: 5
SUM OF ALL RESPONSES TO PHQ QUESTIONS 1-9: 12
8. MOVING OR SPEAKING SO SLOWLY THAT OTHER PEOPLE COULD HAVE NOTICED. OR THE OPPOSITE, BEING SO FIGETY OR RESTLESS THAT YOU HAVE BEEN MOVING AROUND A LOT MORE THAN USUAL: 0
9. THOUGHTS THAT YOU WOULD BE BETTER OFF DEAD, OR OF HURTING YOURSELF: 0
SUM OF ALL RESPONSES TO PHQ QUESTIONS 1-9: 14
3. TROUBLE FALLING OR STAYING ASLEEP: 3
8. MOVING OR SPEAKING SO SLOWLY THAT OTHER PEOPLE COULD HAVE NOTICED. OR THE OPPOSITE, BEING SO FIGETY OR RESTLESS THAT YOU HAVE BEEN MOVING AROUND A LOT MORE THAN USUAL: 0
SUM OF ALL RESPONSES TO PHQ QUESTIONS 1-9: 12
3. TROUBLE FALLING OR STAYING ASLEEP: 3
6. FEELING BAD ABOUT YOURSELF - OR THAT YOU ARE A FAILURE OR HAVE LET YOURSELF OR YOUR FAMILY DOWN: 2
SUM OF ALL RESPONSES TO PHQ QUESTIONS 1-9: 14
1. LITTLE INTEREST OR PLEASURE IN DOING THINGS: 2
6. FEELING BAD ABOUT YOURSELF - OR THAT YOU ARE A FAILURE OR HAVE LET YOURSELF OR YOUR FAMILY DOWN: 1
SUM OF ALL RESPONSES TO PHQ9 QUESTIONS 1 & 2: 5
9. THOUGHTS THAT YOU WOULD BE BETTER OFF DEAD, OR OF HURTING YOURSELF: 0
SUM OF ALL RESPONSES TO PHQ QUESTIONS 1-9: 12
SUM OF ALL RESPONSES TO PHQ QUESTIONS 1-9: 14
SUM OF ALL RESPONSES TO PHQ QUESTIONS 1-9: 12
4. FEELING TIRED OR HAVING LITTLE ENERGY: 2
1. LITTLE INTEREST OR PLEASURE IN DOING THINGS: 2

## 2022-07-25 ASSESSMENT — ENCOUNTER SYMPTOMS
VOICE CHANGE: 0
BACK PAIN: 1
RHINORRHEA: 0
SORE THROAT: 0
NAUSEA: 0
RECTAL PAIN: 0
CONSTIPATION: 0
COUGH: 0
WHEEZING: 1
ABDOMINAL PAIN: 1
ABDOMINAL DISTENTION: 0
PHOTOPHOBIA: 0
SHORTNESS OF BREATH: 0
CHEST TIGHTNESS: 0
FACIAL SWELLING: 0
APNEA: 0
SINUS PAIN: 0
DIARRHEA: 0
VOMITING: 0

## 2022-07-25 ASSESSMENT — LIFESTYLE VARIABLES: HOW OFTEN DO YOU HAVE A DRINK CONTAINING ALCOHOL: NEVER

## 2022-07-25 NOTE — PROGRESS NOTES
22  Raji Taylor : 1945 Sex: female  Age: 76 y.o. Chief Complaint   Patient presents with    Medication Check     3 month follow up    Wheezing     Difficulty taking a deep breath, thinks it might be allergies or sinuses       HPI   overall check up     Review of Systems   Constitutional:  Negative for appetite change, chills, diaphoresis, fatigue, fever and unexpected weight change. HENT:  Negative for congestion, dental problem, ear discharge, ear pain, facial swelling, hearing loss, mouth sores, rhinorrhea, sinus pain, sore throat, tinnitus and voice change. Eyes:  Negative for photophobia and visual disturbance (no visual changes). Respiratory:  Positive for wheezing (on occasion   with a deep breath). Negative for apnea, cough, chest tightness and shortness of breath. Cardiovascular:  Negative for chest pain and palpitations. Gastrointestinal:  Positive for abdominal pain. Negative for abdominal distention, constipation, diarrhea, nausea, rectal pain and vomiting. Following with Dr Rosalio Rodriguez    Endocrine: Negative for cold intolerance, heat intolerance and polyuria. Genitourinary:  Positive for urgency. Negative for dysuria, frequency and hematuria. Musculoskeletal:  Positive for arthralgias and back pain. Negative for gait problem, joint swelling, myalgias, neck pain and neck stiffness. Skin:  Negative for rash and wound. Allergic/Immunologic: Negative for environmental allergies. Neurological:  Negative for dizziness, tremors, seizures, syncope, facial asymmetry, speech difficulty, weakness, light-headedness, numbness and headaches. Hematological:  Does not bruise/bleed easily. Psychiatric/Behavioral:  Negative for agitation, hallucinations and suicidal ideas. The patient is not nervous/anxious. Physical Exam  Vitals reviewed. Constitutional:       General: She is not in acute distress. Appearance: She is ill-appearing.    HENT:      Head: Normocephalic. Right Ear: Tympanic membrane, ear canal and external ear normal. There is no impacted cerumen. Left Ear: Tympanic membrane, ear canal and external ear normal. There is no impacted cerumen. Nose: No rhinorrhea. Mouth/Throat:      Pharynx: No posterior oropharyngeal erythema. Eyes:      General:         Right eye: No discharge. Left eye: No discharge. Extraocular Movements: Extraocular movements intact. Pupils: Pupils are equal, round, and reactive to light. Neck:      Vascular: No carotid bruit. Cardiovascular:      Rate and Rhythm: Regular rhythm. Heart sounds: Murmur (1/6 murmur ) heard. No gallop. Pulmonary:      Breath sounds: Rhonchi present. No rales. Abdominal:      Palpations: Abdomen is soft. There is no mass. Tenderness: There is no abdominal tenderness. There is no guarding or rebound. Hernia: No hernia is present. Comments: Ostomy    Musculoskeletal:         General: Normal range of motion. Cervical back: Neck supple. Comments: Back pain and tenderness in the low back and sacral area   Lymphadenopathy:      Cervical: No cervical adenopathy. Skin:     General: Skin is warm and dry. Findings: No bruising or rash. Neurological:      General: No focal deficit present. Mental Status: She is alert and oriented to person, place, and time. Cranial Nerves: No cranial nerve deficit. Motor: Weakness present. Coordination: Coordination abnormal.      Gait: Gait abnormal.   Psychiatric:         Mood and Affect: Mood normal.         Behavior: Behavior normal.       Assessment and Plan:  Twan López was seen today for medication check and wheezing. Diagnoses and all orders for this visit:    Dyspnea, unspecified type  -     XR CHEST (2 VW);  Future    Bronchitis, mucopurulent recurrent (HCC)    Attention to ileostomy (HCC)    Severe protein-calorie malnutrition (HCC)    Major depressive disorder, recurrent, mild (HCC)    Myeloproliferative disorder ([facility] [address]    Other orders  -     albuterol sulfate HFA (PROVENTIL HFA) 108 (90 Base) MCG/ACT inhaler; Inhale 2 puffs into the lungs every 6 hours as needed for Wheezing        Discussions/Education provided to patients during visit:  [] Discussed the importance to stop smoking. [] Advised to monitor eating habits. [] Reviewed and discussed Imaging results. [x] Reviewed and discussed Lab results. [x] Discussed the importance of drinking plenty of fluids. [x] Cut down on Salt, Caffeine, and Sugar.  [] Non Compliant Patient   [x] Communicated with patient any concerns, to phone office. Discussed grief reaction from  still going on for quite a long period of time. 2months and labs  We sent her for chest x-ray because of some wheezing and some difficulty with breathing. Gave her an albuterol inhaler to help with that  Following up with GI and various problems with her ostomy and hernia has that sort of phenomenon going on so she is following up with GI on.  Recheck 1 month        Time spent face to face with patient discussing medications and labs:30 minutes. No follow-ups on file.       Seen by:     Raf Schaffer DO

## 2022-07-25 NOTE — PROGRESS NOTES
Medicare Annual Wellness Visit    Stephen Fox is here for Medicare AWV    Assessment & Plan   Medicare annual wellness visit, subsequent    Recommendations for Preventive Services Due: see orders and patient instructions/AVS.  Recommended screening schedule for the next 5-10 years is provided to the patient in written form: see Patient Instructions/AVS.     Return for Medicare Annual Wellness Visit in 1 year. Subjective       Patient's complete Health Risk Assessment and screening values have been reviewed and are found in Flowsheets. The following problems were reviewed today and where indicated follow up appointments were made and/or referrals ordered. Positive Risk Factor Screenings with Interventions:    Fall Risk:  Do you feel unsteady or are you worried about falling? : (!) yes  2 or more falls in past year?: (!) yes  Fall with injury in past year?: no   Fall Risk Interventions:    Physical therapy referral ordered for strength and balance training she declines      Depression:  PHQ-2 Score: 5  PHQ-9 Total Score: 14    Severity:1-4 = minimal depression, 5-9 = mild depression, 10-14 = moderate depression, 15-19 = moderately severe depression, 20-27 = severe depression  Depression Interventions:offered meds  declined unless I can make her  come back to life Grief reaction,good family support . Opioid Risk: (Low risk score <55) Opioid risk score: 14    Patient is low risk for opioid use disorder or overdose.   Last PDMP [facility] as Reviewed:  Review User Review Instant Review Result   SHAKILA MENDOZA 3/28/2022  5:08 PM Reviewed PDMP [1]        General Health and ACP:  General  In general, how would you say your health is?: Good  In the past 7 days, have you experienced any of the following: New or Increased Pain, New or Increased Fatigue, Loneliness, Social Isolation, Stress or Anger?: No  Do you get the social and emotional support that you need?: Yes  Do you have a Living Will?: Yes    Advance Directives       [facility] ACP-Advance Directive ACP-Power of     Not on File Not on File Not on File Not on File          General Health Risk Interventions: discussed no changes     Health Habits/Nutrition:  Physical Activity: Inactive    Days of Exercise per Week: 0 days    Minutes of Exercise per Session: 0 min     Have you lost any weight without trying in the past 3 months?: (!) Yes  Body mass index: (!) 25.09  Have you seen the dentist within the past year?: (!) No  Health Habits/Nutrition Interventions:  Dental exam overdue:  patient encouraged to make appointment with his/her dentist             Objective   Vitals:    07/25/22 1324   BP: 136/66   Site: Left Upper Arm   Position: Sitting   Cuff Size: Large Adult   Pulse: 67   Resp: 17   Temp: 97.6 °F (36.4 °C)   TempSrc: Temporal   SpO2: 99%   Weight: 137 lb 3.2 oz (62.2 kg)   Height: 5' 2\" (1.575 m)      Body mass index is 25.09 kg/m². Allergies   Allergen Reactions    Doxycycline Hyclate Nausea Only    Cataflam [Diclofenac]     Celebrex [Celecoxib]     Lamisil [Terbinafine] Swelling    Levaquin [Levofloxacin]     Lipitor [Atorvastatin Calcium]     Oxycodone      Prior to Visit Medications    Medication Sig Taking? Authorizing Provider   HYDROcodone-acetaminophen (NORCO) 7.5-325 MG per tablet Take 1 tablet by mouth 4 times daily for 30 days. Yes Cynthia Larsen, DO   LORazepam (ATIVAN) 0.5 MG tablet Take 1 tablet by mouth daily as needed for Anxiety for up to 30 days.  Yes Cynthia Larsen, DO   zolpidem (AMBIEN) 5 MG tablet take 1 tablet by mouth nightly Yes Cynthia Larsen, DO   metoprolol tartrate (LOPRESSOR) 50 MG tablet Take 1 tablet by mouth daily Yes WIL Merrill CNP   dicyclomine (BENTYL) 20 MG tablet Take 1 tablet by mouth 2 times daily Yes WIL Ng CNP   cholestyramine (QUESTRAN) 4 g packet  Yes Historical Provider, MD   omeprazole (PRILOSEC) 40 MG delayed release capsule Take 1 capsule by mouth daily Yes Jose Montilla DO   hydroxyurea (HYDREA) 500 MG chemo capsule Take 1 capsule by mouth daily Yes Jose Montilla DO   folic acid (FOLVITE) 1 MG tablet Take 1 tablet by mouth daily Yes WIL Askew CNP   simvastatin (ZOCOR) 40 MG tablet Take 1 tablet by mouth nightly Yes WIL Askew CNP   citalopram (CELEXA) 40 MG tablet Take 1 tablet by mouth daily Yes Jose Montilla DO   furosemide (LASIX) 20 MG tablet Take 1 tablet by mouth daily  Patient taking differently: Take 20 mg by mouth every other day Yes Jose Montilla DO   Handicap Placard MISC Permanent disability   5 years Yes Jose Montilla DO   aspirin 81 MG EC tablet Take 81 mg by mouth Yes Historical Provider, MD   diclofenac sodium (VOLTAREN) 1 % GEL Apply 2 g topically 2 times daily Yes Historical Provider, MD   loratadine (CLARITIN) 10 MG tablet Take 10 mg by mouth in the morning. prn.  Yes Historical Provider, MD   Cholecalciferol (VITAMIN D3 PO) Take by mouth daily Yes Historical Provider, MD   Multiple Vitamins-Minerals (THERAPEUTIC MULTIVITAMIN-MINERALS) tablet Take 1 tablet by mouth daily Yes Historical Provider, MD   albuterol sulfate HFA (PROVENTIL HFA) 108 (90 Base) MCG/ACT inhaler Inhale 2 puffs into the lungs every 6 hours as needed for Wheezing  Jose Montilla DO   mirtazapine (REMERON) 7.5 MG tablet Take 1 tablet by mouth nightly  Patient not taking: No sig reported  Jose Montilla DO   diclofenac sodium (VOLTAREN) 1 % GEL Apply 2 g topically 2 times daily  Patient not taking: No sig reported  Jose Montilla DO   triamcinolone (NASACORT ALLERGY 24HR) 55 MCG/ACT nasal inhaler 2 sprays by Each Nostril route daily Prn  Patient not taking: No sig reported  Jose Montilla DO   cyclobenzaprine (FLEXERIL) 10 MG tablet Take 10 mg by mouth 2 times daily as needed  Patient not taking: No sig reported  Historical Provider, MD Ambrosio (Including outside providers/suppliers regularly

## 2022-07-26 ENCOUNTER — TELEPHONE (OUTPATIENT)
Dept: FAMILY MEDICINE CLINIC | Age: 77
End: 2022-07-26

## 2022-07-26 NOTE — TELEPHONE ENCOUNTER
Patient called and stated that she saw Dr. Veronica Chambers yesterday and had mentioned that she was having some chest congestion with some wheezing and slight SOB. She hasn't picked up the albuterol sent to the pharmacy yet. She is asking for an antibiotic to be sent to the pharmacy also.

## 2022-07-27 DIAGNOSIS — J40 BRONCHITIS: Primary | ICD-10-CM

## 2022-07-27 RX ORDER — AZITHROMYCIN 250 MG/1
250 TABLET, FILM COATED ORAL SEE ADMIN INSTRUCTIONS
Qty: 6 TABLET | Refills: 0 | Status: SHIPPED | OUTPATIENT
Start: 2022-07-27 | End: 2022-08-01

## 2022-08-12 DIAGNOSIS — M54.42 CHRONIC MIDLINE LOW BACK PAIN WITH LEFT-SIDED SCIATICA: ICD-10-CM

## 2022-08-12 DIAGNOSIS — G89.29 CHRONIC MIDLINE LOW BACK PAIN WITH LEFT-SIDED SCIATICA: ICD-10-CM

## 2022-08-12 RX ORDER — HYDROCODONE BITARTRATE AND ACETAMINOPHEN 7.5; 325 MG/1; MG/1
1 TABLET ORAL 4 TIMES DAILY
Qty: 120 TABLET | Refills: 0 | Status: SHIPPED
Start: 2022-08-12 | End: 2022-09-14 | Stop reason: SDUPTHER

## 2022-08-24 DIAGNOSIS — F51.01 PRIMARY INSOMNIA: ICD-10-CM

## 2022-08-24 RX ORDER — SIMVASTATIN 40 MG
40 TABLET ORAL NIGHTLY
Qty: 90 TABLET | Refills: 1 | Status: SHIPPED | OUTPATIENT
Start: 2022-08-24 | End: 2022-11-22

## 2022-08-24 RX ORDER — FUROSEMIDE 20 MG/1
20 TABLET ORAL DAILY
Qty: 90 TABLET | Refills: 1 | Status: SHIPPED | OUTPATIENT
Start: 2022-08-24 | End: 2023-02-20

## 2022-08-24 RX ORDER — ZOLPIDEM TARTRATE 5 MG/1
TABLET ORAL
Qty: 30 TABLET | Refills: 2 | Status: SHIPPED | OUTPATIENT
Start: 2022-08-24 | End: 2022-11-16

## 2022-09-02 ENCOUNTER — APPOINTMENT (OUTPATIENT)
Dept: GENERAL RADIOLOGY | Age: 77
DRG: 482 | End: 2022-09-02
Payer: MEDICARE

## 2022-09-02 ENCOUNTER — APPOINTMENT (OUTPATIENT)
Dept: CT IMAGING | Age: 77
DRG: 482 | End: 2022-09-02
Payer: MEDICARE

## 2022-09-02 ENCOUNTER — HOSPITAL ENCOUNTER (INPATIENT)
Age: 77
LOS: 4 days | Discharge: NURSING, REHAB OR LONG-TERM CARE | DRG: 482 | End: 2022-09-06
Attending: EMERGENCY MEDICINE | Admitting: INTERNAL MEDICINE
Payer: MEDICARE

## 2022-09-02 ENCOUNTER — ANESTHESIA EVENT (OUTPATIENT)
Dept: OPERATING ROOM | Age: 77
DRG: 482 | End: 2022-09-02
Payer: MEDICARE

## 2022-09-02 ENCOUNTER — ANESTHESIA (OUTPATIENT)
Dept: OPERATING ROOM | Age: 77
DRG: 482 | End: 2022-09-02
Payer: MEDICARE
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
## 2022-09-02 DIAGNOSIS — S72.145A CLOSED NONDISPLACED INTERTROCHANTERIC FRACTURE OF LEFT FEMUR, INITIAL ENCOUNTER (HCC): Primary | ICD-10-CM

## 2022-09-02 PROBLEM — S72.142A CLOSED INTERTROCHANTERIC FRACTURE, LEFT, INITIAL ENCOUNTER (HCC): Status: ACTIVE | Noted: 2022-09-02

## 2022-09-02 LAB
ANION GAP SERPL CALCULATED.3IONS-SCNC: 11 MMOL/L (ref 7–16)
BASOPHILS ABSOLUTE: 0.07 E9/L (ref 0–0.2)
BASOPHILS RELATIVE PERCENT: 0.9 % (ref 0–2)
BUN BLDV-MCNC: 15 MG/DL (ref 6–23)
CALCIUM SERPL-MCNC: 9 MG/DL (ref 8.6–10.2)
CHLORIDE BLD-SCNC: 103 MMOL/L (ref 98–107)
CO2: 25 MMOL/L (ref 22–29)
CREAT SERPL-MCNC: 0.6 MG/DL (ref 0.5–1)
EKG ATRIAL RATE: 76 BPM
EKG P AXIS: 28 DEGREES
EKG P-R INTERVAL: 168 MS
EKG Q-T INTERVAL: 430 MS
EKG QRS DURATION: 118 MS
EKG QTC CALCULATION (BAZETT): 483 MS
EKG R AXIS: -47 DEGREES
EKG T AXIS: 25 DEGREES
EKG VENTRICULAR RATE: 76 BPM
EOSINOPHILS ABSOLUTE: 0 E9/L (ref 0.05–0.5)
EOSINOPHILS RELATIVE PERCENT: 0 % (ref 0–6)
GFR AFRICAN AMERICAN: >60
GFR NON-AFRICAN AMERICAN: >60 ML/MIN/1.73
GLUCOSE BLD-MCNC: 110 MG/DL (ref 74–99)
HCT VFR BLD CALC: 33.7 % (ref 34–48)
HEMOGLOBIN: 11.5 G/DL (ref 11.5–15.5)
INR BLD: 1.1
LYMPHOCYTES ABSOLUTE: 0.41 E9/L (ref 1.5–4)
LYMPHOCYTES RELATIVE PERCENT: 5.2 % (ref 20–42)
MCH RBC QN AUTO: 40.8 PG (ref 26–35)
MCHC RBC AUTO-ENTMCNC: 34.1 % (ref 32–34.5)
MCV RBC AUTO: 119.5 FL (ref 80–99.9)
MONOCYTES ABSOLUTE: 0.24 E9/L (ref 0.1–0.95)
MONOCYTES RELATIVE PERCENT: 2.6 % (ref 2–12)
NEUTROPHILS ABSOLUTE: 7.37 E9/L (ref 1.8–7.3)
NEUTROPHILS RELATIVE PERCENT: 91.3 % (ref 43–80)
NUCLEATED RED BLOOD CELLS: 0 /100 WBC
OVALOCYTES: ABNORMAL
PDW BLD-RTO: 13.9 FL (ref 11.5–15)
PLATELET # BLD: 223 E9/L (ref 130–450)
PMV BLD AUTO: 10.1 FL (ref 7–12)
POIKILOCYTES: ABNORMAL
POTASSIUM REFLEX MAGNESIUM: 4.1 MMOL/L (ref 3.5–5)
PROTHROMBIN TIME: 11.4 SEC (ref 9.3–12.4)
RBC # BLD: 2.82 E12/L (ref 3.5–5.5)
SODIUM BLD-SCNC: 139 MMOL/L (ref 132–146)
WBC # BLD: 8.1 E9/L (ref 4.5–11.5)

## 2022-09-02 PROCEDURE — 6370000000 HC RX 637 (ALT 250 FOR IP): Performed by: FAMILY MEDICINE

## 2022-09-02 PROCEDURE — 96375 TX/PRO/DX INJ NEW DRUG ADDON: CPT

## 2022-09-02 PROCEDURE — 6370000000 HC RX 637 (ALT 250 FOR IP): Performed by: EMERGENCY MEDICINE

## 2022-09-02 PROCEDURE — 6360000002 HC RX W HCPCS: Performed by: STUDENT IN AN ORGANIZED HEALTH CARE EDUCATION/TRAINING PROGRAM

## 2022-09-02 PROCEDURE — 6360000002 HC RX W HCPCS: Performed by: FAMILY MEDICINE

## 2022-09-02 PROCEDURE — 72125 CT NECK SPINE W/O DYE: CPT

## 2022-09-02 PROCEDURE — 85025 COMPLETE CBC W/AUTO DIFF WBC: CPT

## 2022-09-02 PROCEDURE — 2580000003 HC RX 258: Performed by: ORTHOPAEDIC SURGERY

## 2022-09-02 PROCEDURE — 85610 PROTHROMBIN TIME: CPT

## 2022-09-02 PROCEDURE — 72131 CT LUMBAR SPINE W/O DYE: CPT

## 2022-09-02 PROCEDURE — 73502 X-RAY EXAM HIP UNI 2-3 VIEWS: CPT

## 2022-09-02 PROCEDURE — 96374 THER/PROPH/DIAG INJ IV PUSH: CPT

## 2022-09-02 PROCEDURE — 36415 COLL VENOUS BLD VENIPUNCTURE: CPT

## 2022-09-02 PROCEDURE — 99285 EMERGENCY DEPT VISIT HI MDM: CPT

## 2022-09-02 PROCEDURE — 93005 ELECTROCARDIOGRAM TRACING: CPT

## 2022-09-02 PROCEDURE — 70450 CT HEAD/BRAIN W/O DYE: CPT

## 2022-09-02 PROCEDURE — 1200000000 HC SEMI PRIVATE

## 2022-09-02 PROCEDURE — 73590 X-RAY EXAM OF LOWER LEG: CPT

## 2022-09-02 PROCEDURE — 73552 X-RAY EXAM OF FEMUR 2/>: CPT

## 2022-09-02 PROCEDURE — 80048 BASIC METABOLIC PNL TOTAL CA: CPT

## 2022-09-02 RX ORDER — ACETAMINOPHEN 325 MG/1
650 TABLET ORAL EVERY 6 HOURS PRN
Status: DISCONTINUED | OUTPATIENT
Start: 2022-09-02 | End: 2022-09-07 | Stop reason: HOSPADM

## 2022-09-02 RX ORDER — ATORVASTATIN CALCIUM 20 MG/1
20 TABLET, FILM COATED ORAL DAILY
Refills: 1 | Status: DISCONTINUED | OUTPATIENT
Start: 2022-09-02 | End: 2022-09-07 | Stop reason: HOSPADM

## 2022-09-02 RX ORDER — SODIUM CHLORIDE 0.9 % (FLUSH) 0.9 %
5-40 SYRINGE (ML) INJECTION EVERY 12 HOURS SCHEDULED
Status: DISCONTINUED | OUTPATIENT
Start: 2022-09-02 | End: 2022-09-07 | Stop reason: HOSPADM

## 2022-09-02 RX ORDER — HYDROXYUREA 500 MG/1
500 CAPSULE ORAL DAILY
Status: DISCONTINUED | OUTPATIENT
Start: 2022-09-02 | End: 2022-09-07 | Stop reason: HOSPADM

## 2022-09-02 RX ORDER — ACETAMINOPHEN 500 MG
1000 TABLET ORAL ONCE
Status: DISCONTINUED | OUTPATIENT
Start: 2022-09-02 | End: 2022-09-02

## 2022-09-02 RX ORDER — SODIUM CHLORIDE 0.9 % (FLUSH) 0.9 %
5-40 SYRINGE (ML) INJECTION PRN
Status: DISCONTINUED | OUTPATIENT
Start: 2022-09-02 | End: 2022-09-03 | Stop reason: HOSPADM

## 2022-09-02 RX ORDER — CITALOPRAM 20 MG/1
20 TABLET ORAL DAILY
Status: DISCONTINUED | OUTPATIENT
Start: 2022-09-02 | End: 2022-09-07 | Stop reason: HOSPADM

## 2022-09-02 RX ORDER — ALBUTEROL SULFATE 90 UG/1
2 AEROSOL, METERED RESPIRATORY (INHALATION) EVERY 6 HOURS PRN
Status: DISCONTINUED | OUTPATIENT
Start: 2022-09-02 | End: 2022-09-02 | Stop reason: CLARIF

## 2022-09-02 RX ORDER — FUROSEMIDE 20 MG/1
20 TABLET ORAL DAILY
Status: DISCONTINUED | OUTPATIENT
Start: 2022-09-02 | End: 2022-09-07 | Stop reason: HOSPADM

## 2022-09-02 RX ORDER — ONDANSETRON 2 MG/ML
4 INJECTION INTRAMUSCULAR; INTRAVENOUS EVERY 6 HOURS PRN
Status: DISCONTINUED | OUTPATIENT
Start: 2022-09-02 | End: 2022-09-07 | Stop reason: HOSPADM

## 2022-09-02 RX ORDER — HYDROCODONE BITARTRATE AND ACETAMINOPHEN 7.5; 325 MG/1; MG/1
1 TABLET ORAL 4 TIMES DAILY
Status: DISCONTINUED | OUTPATIENT
Start: 2022-09-02 | End: 2022-09-04

## 2022-09-02 RX ORDER — SODIUM CHLORIDE 9 MG/ML
INJECTION, SOLUTION INTRAVENOUS PRN
Status: DISCONTINUED | OUTPATIENT
Start: 2022-09-02 | End: 2022-09-07 | Stop reason: HOSPADM

## 2022-09-02 RX ORDER — SODIUM CHLORIDE 0.9 % (FLUSH) 0.9 %
10 SYRINGE (ML) INJECTION PRN
Status: DISCONTINUED | OUTPATIENT
Start: 2022-09-02 | End: 2022-09-07 | Stop reason: HOSPADM

## 2022-09-02 RX ORDER — POLYETHYLENE GLYCOL 3350 17 G/17G
17 POWDER, FOR SOLUTION ORAL DAILY PRN
Status: DISCONTINUED | OUTPATIENT
Start: 2022-09-02 | End: 2022-09-07 | Stop reason: HOSPADM

## 2022-09-02 RX ORDER — HYDROCODONE BITARTRATE AND ACETAMINOPHEN 7.5; 325 MG/1; MG/1
1 TABLET ORAL ONCE
Status: COMPLETED | OUTPATIENT
Start: 2022-09-02 | End: 2022-09-02

## 2022-09-02 RX ORDER — ACETAMINOPHEN 500 MG
TABLET ORAL
Status: DISPENSED
Start: 2022-09-02 | End: 2022-09-03

## 2022-09-02 RX ORDER — MORPHINE SULFATE 2 MG/ML
2 INJECTION, SOLUTION INTRAMUSCULAR; INTRAVENOUS EVERY 4 HOURS PRN
Status: DISCONTINUED | OUTPATIENT
Start: 2022-09-02 | End: 2022-09-07 | Stop reason: HOSPADM

## 2022-09-02 RX ORDER — ASPIRIN 81 MG/1
81 TABLET ORAL DAILY
Status: DISCONTINUED | OUTPATIENT
Start: 2022-09-02 | End: 2022-09-03

## 2022-09-02 RX ORDER — SODIUM CHLORIDE 9 MG/ML
INJECTION, SOLUTION INTRAVENOUS PRN
Status: DISCONTINUED | OUTPATIENT
Start: 2022-09-02 | End: 2022-09-03 | Stop reason: HOSPADM

## 2022-09-02 RX ORDER — SODIUM CHLORIDE 9 MG/ML
INJECTION, SOLUTION INTRAVENOUS CONTINUOUS
Status: DISCONTINUED | OUTPATIENT
Start: 2022-09-02 | End: 2022-09-03

## 2022-09-02 RX ORDER — ONDANSETRON 4 MG/1
4 TABLET, ORALLY DISINTEGRATING ORAL EVERY 8 HOURS PRN
Status: DISCONTINUED | OUTPATIENT
Start: 2022-09-02 | End: 2022-09-07 | Stop reason: HOSPADM

## 2022-09-02 RX ORDER — POTASSIUM CHLORIDE 20 MEQ/1
40 TABLET, EXTENDED RELEASE ORAL PRN
Status: DISCONTINUED | OUTPATIENT
Start: 2022-09-02 | End: 2022-09-06

## 2022-09-02 RX ORDER — FENTANYL CITRATE 50 UG/ML
25 INJECTION, SOLUTION INTRAMUSCULAR; INTRAVENOUS ONCE
Status: COMPLETED | OUTPATIENT
Start: 2022-09-02 | End: 2022-09-02

## 2022-09-02 RX ORDER — POTASSIUM CHLORIDE 7.45 MG/ML
10 INJECTION INTRAVENOUS PRN
Status: DISCONTINUED | OUTPATIENT
Start: 2022-09-02 | End: 2022-09-06

## 2022-09-02 RX ORDER — SODIUM CHLORIDE 0.9 % (FLUSH) 0.9 %
5-40 SYRINGE (ML) INJECTION EVERY 12 HOURS SCHEDULED
Status: DISCONTINUED | OUTPATIENT
Start: 2022-09-02 | End: 2022-09-03 | Stop reason: HOSPADM

## 2022-09-02 RX ORDER — LORAZEPAM 0.5 MG/1
1 TABLET ORAL DAILY PRN
COMMUNITY
Start: 2022-08-16 | End: 2022-10-26 | Stop reason: SDUPTHER

## 2022-09-02 RX ORDER — ZOLPIDEM TARTRATE 5 MG/1
5 TABLET ORAL NIGHTLY
Status: DISCONTINUED | OUTPATIENT
Start: 2022-09-02 | End: 2022-09-07 | Stop reason: HOSPADM

## 2022-09-02 RX ORDER — METOPROLOL TARTRATE 50 MG/1
50 TABLET, FILM COATED ORAL DAILY
Status: DISCONTINUED | OUTPATIENT
Start: 2022-09-02 | End: 2022-09-07 | Stop reason: HOSPADM

## 2022-09-02 RX ORDER — ACETAMINOPHEN 650 MG/1
650 SUPPOSITORY RECTAL EVERY 6 HOURS PRN
Status: DISCONTINUED | OUTPATIENT
Start: 2022-09-02 | End: 2022-09-07 | Stop reason: HOSPADM

## 2022-09-02 RX ORDER — ONDANSETRON 2 MG/ML
4 INJECTION INTRAMUSCULAR; INTRAVENOUS ONCE
Status: COMPLETED | OUTPATIENT
Start: 2022-09-02 | End: 2022-09-02

## 2022-09-02 RX ORDER — ALBUTEROL SULFATE 2.5 MG/3ML
2.5 SOLUTION RESPIRATORY (INHALATION) EVERY 6 HOURS PRN
Status: DISCONTINUED | OUTPATIENT
Start: 2022-09-02 | End: 2022-09-07 | Stop reason: HOSPADM

## 2022-09-02 RX ADMIN — ONDANSETRON 4 MG: 2 INJECTION INTRAMUSCULAR; INTRAVENOUS at 14:46

## 2022-09-02 RX ADMIN — HYDROCODONE BITARTRATE AND ACETAMINOPHEN 1 TABLET: 7.5; 325 TABLET ORAL at 12:26

## 2022-09-02 RX ADMIN — ONDANSETRON 4 MG: 2 INJECTION INTRAMUSCULAR; INTRAVENOUS at 18:31

## 2022-09-02 RX ADMIN — SODIUM CHLORIDE: 9 INJECTION, SOLUTION INTRAVENOUS at 18:18

## 2022-09-02 RX ADMIN — ZOLPIDEM TARTRATE 5 MG: 5 TABLET ORAL at 23:48

## 2022-09-02 RX ADMIN — CITALOPRAM HYDROBROMIDE 20 MG: 20 TABLET ORAL at 18:37

## 2022-09-02 RX ADMIN — MORPHINE SULFATE 2 MG: 2 INJECTION, SOLUTION INTRAMUSCULAR; INTRAVENOUS at 22:31

## 2022-09-02 RX ADMIN — METOPROLOL TARTRATE 50 MG: 50 TABLET, FILM COATED ORAL at 18:37

## 2022-09-02 RX ADMIN — FENTANYL CITRATE 25 MCG: 50 INJECTION, SOLUTION INTRAMUSCULAR; INTRAVENOUS at 14:46

## 2022-09-02 RX ADMIN — HYDROCODONE BITARTRATE AND ACETAMINOPHEN 1 TABLET: 7.5; 325 TABLET ORAL at 21:12

## 2022-09-02 RX ADMIN — MORPHINE SULFATE 2 MG: 2 INJECTION, SOLUTION INTRAMUSCULAR; INTRAVENOUS at 18:32

## 2022-09-02 ASSESSMENT — PAIN - FUNCTIONAL ASSESSMENT
PAIN_FUNCTIONAL_ASSESSMENT: PREVENTS OR INTERFERES SOME ACTIVE ACTIVITIES AND ADLS
PAIN_FUNCTIONAL_ASSESSMENT: 0-10
PAIN_FUNCTIONAL_ASSESSMENT: PREVENTS OR INTERFERES WITH ALL ACTIVE AND SOME PASSIVE ACTIVITIES
PAIN_FUNCTIONAL_ASSESSMENT: PREVENTS OR INTERFERES SOME ACTIVE ACTIVITIES AND ADLS

## 2022-09-02 ASSESSMENT — PAIN DESCRIPTION - DESCRIPTORS
DESCRIPTORS: ACHING;DISCOMFORT
DESCRIPTORS: ACHING;DISCOMFORT;SHARP
DESCRIPTORS: ACHING;DISCOMFORT

## 2022-09-02 ASSESSMENT — PAIN SCALES - GENERAL
PAINLEVEL_OUTOF10: 7
PAINLEVEL_OUTOF10: 7
PAINLEVEL_OUTOF10: 10
PAINLEVEL_OUTOF10: 10
PAINLEVEL_OUTOF10: 8
PAINLEVEL_OUTOF10: 9
PAINLEVEL_OUTOF10: 8

## 2022-09-02 ASSESSMENT — PAIN DESCRIPTION - ORIENTATION
ORIENTATION: LEFT
ORIENTATION: LEFT
ORIENTATION: RIGHT

## 2022-09-02 ASSESSMENT — ENCOUNTER SYMPTOMS
DIARRHEA: 0
VOICE CHANGE: 0
COUGH: 0
ABDOMINAL PAIN: 0
SHORTNESS OF BREATH: 0
TROUBLE SWALLOWING: 0
VOMITING: 0
NAUSEA: 0
PHOTOPHOBIA: 0

## 2022-09-02 ASSESSMENT — PAIN DESCRIPTION - PAIN TYPE
TYPE: ACUTE PAIN;CHRONIC PAIN
TYPE: ACUTE PAIN
TYPE: ACUTE PAIN

## 2022-09-02 ASSESSMENT — PAIN DESCRIPTION - LOCATION
LOCATION: HIP;LEG
LOCATION: HIP
LOCATION: HIP

## 2022-09-02 ASSESSMENT — PAIN DESCRIPTION - FREQUENCY: FREQUENCY: CONTINUOUS

## 2022-09-02 ASSESSMENT — PAIN DESCRIPTION - ONSET: ONSET: ON-GOING

## 2022-09-02 NOTE — PROGRESS NOTES
Orthopaedic Surgery    Consult received, imaging reviewed showing left hip intertrochanteric fracture. Plan for operative management Saturday AM 7:30. Please keep NPO, hold anticoagulation.     Full consult note to follow     Gabi Aranda MD  Orthopaedic Surgery   9/2/22  5:42 PM

## 2022-09-02 NOTE — ED PROVIDER NOTES
HPI   Patient is a 47-year-old female with past medical history of Crohn's, CAD, hyperlipidemia, hypertension and diabetes presenting to the emergency department due to mechanical fall. Patient states that she was grabbing supplies for ileostomy and changing into her robe when she lost her balance and fell over onto her left side. Patient states that she did bump her head but denies any loss of consciousness. Patient was down for about an hour before she was able to get to her phone to call for help. Patient's daughter lives close by and was able to help the patient and calling EMS to bring her into the ED for evaluation. Patient endorses pain in her left leg and hip but denies any pain anywhere else. There are no obvious injuries or deformities to the head, neck, chest, trunk or extremities on initial evaluation. No obvious limb deformity noted as well. Patient is not on any blood thinners. He does admit to recent right hip replacement completed at [place]. She denies any chest pain, shortness of breath, nausea, vomiting, lightheadedness, dizziness, fever, chills, syncope, urinary symptoms, abdominal pain, constipation or diarrhea. Her symptoms are moderate with no remitting or exacerbating factors. No treatment prior to arrival to the ED. Review of Systems   Constitutional:  Negative for chills and fever. HENT:  Negative for trouble swallowing and voice change. Eyes:  Negative for photophobia and visual disturbance. Respiratory:  Negative for cough and shortness of breath. Cardiovascular:  Negative for chest pain and palpitations. Gastrointestinal:  Negative for abdominal pain, diarrhea, nausea and vomiting. Genitourinary:  Negative for dysuria and frequency. Musculoskeletal:  Positive for arthralgias. Skin:  Negative for rash and wound. Neurological:  Negative for dizziness, weakness, numbness and headaches.    Psychiatric/Behavioral:  Negative for behavioral problems and confusion. Physical Exam  Constitutional:       General: She is not in acute distress. Appearance: Normal appearance. She is not ill-appearing. HENT:      Head: Normocephalic and atraumatic. Right Ear: External ear normal.      Left Ear: External ear normal.      Nose: Nose normal.      Mouth/Throat:      Mouth: Mucous membranes are moist.      Pharynx: Oropharynx is clear. Eyes:      Conjunctiva/sclera: Conjunctivae normal.      Pupils: Pupils are equal, round, and reactive to light. Cardiovascular:      Rate and Rhythm: Normal rate and regular rhythm. Pulses: Normal pulses. Heart sounds: Normal heart sounds. Pulmonary:      Effort: Pulmonary effort is normal. No respiratory distress. Breath sounds: Normal breath sounds. No wheezing or rales. Abdominal:      General: Abdomen is flat. Palpations: Abdomen is soft. Tenderness: There is no abdominal tenderness. There is no guarding or rebound. Musculoskeletal:         General: Tenderness present. Cervical back: Normal range of motion and neck supple. Comments: Left hip and leg tenderness. Bilaterally LE neurovascularly intact. Good DP/TP pulse palpated. No sensory deficits. Left lower extremity has no deformities with no external rotation or shortening seen. Skin:     General: Skin is warm and dry. Capillary Refill: Capillary refill takes less than 2 seconds. Neurological:      General: No focal deficit present. Mental Status: She is alert and oriented to person, place, and time. Cranial Nerves: No cranial nerve deficit. Coordination: Coordination normal.   Psychiatric:         Mood and Affect: Mood normal.         Behavior: Behavior normal.        Procedures   EKG: This EKG is signed and interpreted by me. Sinus rhythm with ventricular rate of 76 bpm.  Left axis deviation. IA interval normal.  QTc prolonged at 483 MS.   Right bundle branch block with left anterior fascicular block. No evidence of acute ST elevation MI. Right bundle branch block new compared to previous EKG on 8/17/2022    MDM  Patient is a 77-year-old female with past medical history of Crohn's, CAD, hyperlipidemia, hypertension and diabetes presenting to the emergency department due to mechanical fall. Patient was brought in from home by EMS for evaluation. Physical exam essentially benign. Patient did have tenderness over her left hip and leg but lower extremities were vastly intact. Work-up significant for left intertrochanteric fracture on hip x-ray. Remaining imaging studies were unremarkable. Given pain medication and antinausea medication in the emergency department. Spoke with orthopedic surgery who agreed to follow the patient once admitted. Discussed case with Celia Truong who accepted the patient for admission under Dr. Naldo Acevedo. Patient remained stable in the ED. Patient and daughter who is at bedside were updated on work-up and results as well as plan. They are agreeable to this. Patient remained stable in the ED. ED Course as of 09/27/22 2213   Fri Sep 02, 2022   1433 Spoke with Dr. Saranya Orellana. He will follow the patient when she is admitted to the hospital.  He is aware of her left hip fracture. [PP]   9868 Patient accepted to admission by Celia Truong under Dr. Naldo Acveedo.  [PP]      ED Course User Index  [PP] Leonardo Parrish, DO        --------------------------------------------- PAST HISTORY ---------------------------------------------  Past Medical History:  has a past medical history of Arthritis, Crohn disease ([facility] [address] Diabetes mellitus ([facility] [address] Hyperlipidemia, Hypertension, Insomnia, Myelodysplastic disease ([facility] [address] Ulcerative colitis with intestinal obstruction ([facility] [address] and Unspecified diseases of blood and blood-forming organs.     Past Surgical History:  has a past surgical history that includes Colon surgery; cervical fusion; joint replacement; colostomy; hernia repair; Tubal ligation; Carpal tunnel release (Right); joint replacement (Bilateral); eye surgery (Bilateral); Adenoidectomy; and Tonsillectomy. Social History:  reports that she quit smoking about 22 years ago. Her smoking use included cigarettes. She has a 45.00 pack-year smoking history. She has never used smokeless tobacco. She reports that she does not drink alcohol and does not use drugs. Family History: family history includes Breast Cancer in her mother; Diabetes in her father and sister; Heart Failure in her sister. The patients home medications have been reviewed. Allergies: Doxycycline hyclate, Cataflam [diclofenac], Celebrex [celecoxib], Lamisil [terbinafine], Levaquin [levofloxacin], Lipitor [atorvastatin calcium], and Oxycodone    -------------------------------------------------- RESULTS -------------------------------------------------    LABS:  Results for orders placed or performed during the hospital encounter of 09/02/22   CBC with Auto Differential   Result Value Ref Range    WBC 8.1 4.5 - 11.5 E9/L    RBC 2.82 (L) 3.50 - 5.50 E12/L    Hemoglobin 11.5 11.5 - 15.5 g/dL    Hematocrit 33.7 (L) 34.0 - 48.0 %    .5 (H) 80.0 - 99.9 fL    MCH 40.8 (H) 26.0 - 35.0 pg    MCHC 34.1 32.0 - 34.5 %    RDW 13.9 11.5 - 15.0 fL    Platelets 083 398 - 998 E9/L    MPV 10.1 7.0 - 12.0 fL   Basic Metabolic Panel w/ Reflex to MG   Result Value Ref Range    Sodium 139 132 - 146 mmol/L    Potassium reflex Magnesium 4.1 3.5 - 5.0 mmol/L    Chloride 103 98 - 107 mmol/L    CO2 25 22 - 29 mmol/L    Anion Gap 11 7 - 16 mmol/L    Glucose 110 (H) 74 - 99 mg/dL    BUN 15 6 - 23 mg/dL    Creatinine 0.6 0.5 - 1.0 mg/dL    GFR Non-African American >60 >=60 mL/min/1.73    GFR African American >60     Calcium 9.0 8.6 - 10.2 mg/dL   Protime-INR   Result Value Ref Range    Protime 11.4 9.3 - 12.4 sec    INR 1.1        RADIOLOGY:  XR HIP 2-3 VW W PELVIS LEFT   Final Result   Nondisplaced intertrochanteric fracture on the left.          XR FEMUR LEFT (MIN 2 VIEWS)   Final Result   Nondisplaced intertrochanteric fracture on the left. XR TIBIA FIBULA LEFT (2 VIEWS)   Final Result   No acute bony abnormalities or prosthetic complications. CT Head WO Contrast   Final Result   1. There is no acute intracranial abnormality. Specifically, there is no   intracranial hemorrhage. 2. Atrophy and periventricular leukomalacia,   3. Small right frontal scalp contusion. .         CT Cervical Spine WO Contrast   Final Result   1. There is no acute compression fracture or subluxation of the cervical   spine. 2. Multilevel degenerative disc and degenerative joint disease. 3. Previous anterior fusion of C3 through C6. There is no hardware   complication. CT LUMBAR SPINE WO CONTRAST   Final Result   1. No acute fracture or subluxation. 2. Marked bony demineralization with degenerative changes throughout the   lumbar spine. There is multilevel central canal stenosis and neural   foraminal narrowing. 3. Cholelithiasis. 4. Left nonobstructive nephrolithiasis.             ------------------------- NURSING NOTES AND VITALS REVIEWED ---------------------------  Date / Time Roomed:  9/2/2022 11:37 AM  ED Bed Assignment:  14/14    The nursing notes within the ED encounter and vital signs as below have been reviewed. Patient Vitals for the past 24 hrs:   BP Temp Temp src Pulse Resp Height Weight   09/02/22 1151 (!) 192/91 97.9 °F (36.6 °C) Oral 62 18 5' 2\" (1.575 m) 138 lb (62.6 kg)       Oxygen Saturation Interpretation: Normal    ------------------------------------------ PROGRESS NOTES ------------------------------------------    Counseling:  I have spoken with the patient and discussed todays results, in addition to providing specific details for the plan of care and counseling regarding the diagnosis and prognosis.   Their questions are answered at this time and they are agreeable with the plan of admission.    --------------------------------- fracture of left femur, initial encounter ([facility] [address] A diagnosis of Fall, initial encounter was also pertinent to this visit.   Current Discharge Medication List        Lissy Mejia, [address], DO  09/02/22 [facility],   09/27/22 2995

## 2022-09-02 NOTE — ANESTHESIA PRE PROCEDURE
Department of Anesthesiology  Preprocedure Note       Name:  Lance Spatz   Age:  76 y.o.  :  1945                                          MRN:  [id number]         Date:  2022      Surgeon: * No surgeons listed *    Procedure: * No procedures listed *    Medications prior to admission:   Prior to Admission medications    Medication Sig Start Date End Date Taking? Authorizing Provider   furosemide (LASIX) 20 MG tablet Take 1 tablet by mouth daily 22  Foye Every, DO   simvastatin (ZOCOR) 40 MG tablet Take 1 tablet by mouth nightly 22  Foye Every, DO   zolpidem (AMBIEN) 5 MG tablet take 1 tablet by mouth nightly 22  Foye Every, DO   LORazepam (ATIVAN) 0.5 MG tablet Take 1 tablet by mouth daily as needed. 22   Historical Provider, MD   HYDROcodone-acetaminophen (NORCO) 7.5-325 MG per tablet Take 1 tablet by mouth in the morning and 1 tablet at noon and 1 tablet in the evening and 1 tablet before bedtime. Do all this for 30 days.  22  Foye Every, DO   albuterol sulfate HFA (PROVENTIL HFA) 108 (90 Base) MCG/ACT inhaler Inhale 2 puffs into the lungs every 6 hours as needed for Wheezing 22   Foye Every, DO   metoprolol tartrate (LOPRESSOR) 50 MG tablet Take 1 tablet by mouth daily 22   WIL Hurst CNP   dicyclomine (BENTYL) 20 MG tablet Take 1 tablet by mouth 2 times daily  Patient taking differently: Take 20 mg by mouth 2 times daily as needed 22  WIL Hurst CNP   cholestyramine Verla Mend) 4 g packet Take 1 packet by mouth daily 3/10/22   Historical Provider, MD   omeprazole (PRILOSEC) 40 MG delayed release capsule Take 1 capsule by mouth daily 4/20/22 10/17/22  Foye Every, DO   hydroxyurea (HYDREA) 500 MG chemo capsule Take 1 capsule by mouth daily 4/20/22 10/17/22  Foye Every, DO   folic acid (FOLVITE) 1 MG tablet Take 1 tablet by mouth daily 22  Fabian Millard APRN - CNP   citalopram (CELEXA) 40 MG tablet Take 1 tablet by mouth daily  Patient taking differently: Take 20 mg by mouth daily 10/1/21 9/2/22  Acton Rings, DO   Handicap Placard MISC Permanent disability   5 years 5/17/21   Acton Rings, DO   aspirin 81 MG EC tablet Take 81 mg by mouth    Historical Provider, MD   diclofenac sodium (VOLTAREN) 1 % GEL Apply 2 g topically 2 times daily as needed    Historical Provider, MD   Cholecalciferol (VITAMIN D3 PO) Take 1,000 Units by mouth daily    Historical Provider, MD   Multiple Vitamins-Minerals (THERAPEUTIC MULTIVITAMIN-MINERALS) tablet Take 1 tablet by mouth daily    Historical Provider, MD       Current medications:    Current Facility-Administered Medications   Medication Dose Route Frequency Provider Last Rate Last Admin    [START ON 9/3/2022] ceFAZolin (ANCEF) 2,000 mg in sterile water 20 mL IV syringe  2,000 mg IntraVENous See Admin Instructions Clifton Guerra DO        0.9 % sodium chloride infusion   IntraVENous Continuous Sunni Colon  mL/hr at 09/02/22 1818 New Bag at 09/02/22 1818    sodium chloride flush 0.9 % injection 5-40 mL  5-40 mL IntraVENous 2 times per day Sunni Colon MD        sodium chloride flush 0.9 % injection 5-40 mL  5-40 mL IntraVENous PRN Sunni Colon MD        0.9 % sodium chloride infusion   IntraVENous PRN Sunni Colon MD        ceFAZolin (ANCEF) 2,000 mg in sterile water 20 mL IV syringe  2,000 mg IntraVENous See Admin Instructions Sunni Colon MD        [Held by provider] aspirin EC tablet 81 mg  81 mg Oral Daily Guille Hollingsworth APRN - BING        citalopram (CELEXA) tablet 20 mg  20 mg Oral Daily Guille Hollingsworth APRN - CNP   20 mg at 09/02/22 1837    furosemide (LASIX) tablet 20 mg  20 mg Oral Daily Guille Hollingsworth APRN - CNP        HYDROcodone-acetaminophen (NORCO) 7.5-325 MG per tablet 1 tablet  1 tablet Oral 4x Daily Guille Hollingsworth, APRN - CNP       Ayo Barron hydroxyurea (HYDREA) chemo capsule 500 mg  500 mg Oral Daily Ian Dearth Learn, APRN - CNP        metoprolol tartrate (LOPRESSOR) tablet 50 mg  50 mg Oral Daily Ian Dearth Learn, APRN - CNP   50 mg at 09/02/22 1837    atorvastatin (LIPITOR) tablet 20 mg  20 mg Oral Daily Ian Dearth Learn, APRN - CNP        zolpidem (AMBIEN) tablet 5 mg  5 mg Oral Nightly Ian Dearth Learn, APRN - CNP        sodium chloride flush 0.9 % injection 5-40 mL  5-40 mL IntraVENous 2 times per day Ian Dearth Learn, APRN - CNP        sodium chloride flush 0.9 % injection 10 mL  10 mL IntraVENous PRN Ian Dearth Learn, APRN - CNP        0.9 % sodium chloride infusion   IntraVENous PRN Ian Dearth Learn, APRN - CNP        ondansetron (ZOFRAN-ODT) disintegrating tablet 4 mg  4 mg Oral Q8H PRN Ian Dearth Learn, APRN - CNP        Or    ondansetron (ZOFRAN) injection 4 mg  4 mg IntraVENous Q6H PRN Ian Dearth Learn, APRN - CNP   4 mg at 09/02/22 1831    polyethylene glycol (GLYCOLAX) packet 17 g  17 g Oral Daily PRN Ian Dearth Learn, APRN - CNP        acetaminophen (TYLENOL) tablet 650 mg  650 mg Oral Q6H PRN Ian Dearth Learn, APRN - CNP        Or    acetaminophen (TYLENOL) suppository 650 mg  650 mg Rectal Q6H PRN Ian Dearth Learn, APRN - CNP        morphine (PF) injection 2 mg  2 mg IntraVENous Q4H PRN Ian Dearth Learn, APRN - CNP   2 mg at 09/02/22 1832    potassium chloride (KLOR-CON M) extended release tablet 40 mEq  40 mEq Oral PRN Ian Dearth Learn, APRN - CNP        Or    potassium bicarb-citric acid (EFFER-K) effervescent tablet 40 mEq  40 mEq Oral PRN Ain Dearth Learn, APRN - CNP        Or    potassium chloride 10 mEq/100 mL IVPB (Peripheral Line)  10 mEq IntraVENous PRN Ian Dearth Learn, APRN - CNP        acetaminophen (TYLENOL) 500 MG tablet             albuterol (PROVENTIL) nebulizer solution 2.5 mg  2.5 mg Nebulization Q6H PRN Ian Hollingsworth, APRN - CNP           Allergies:     Allergies   Allergen Reactions    Doxycycline Hyclate Nausea Only    Cataflam [Diclofenac]     Celebrex [Celecoxib]     Lamisil [Terbinafine] Swelling    Levaquin [Levofloxacin]     Lipitor [Atorvastatin Calcium]        Problem List:    Patient Active Problem List   Diagnosis Code    Cro[facility]'s colitis, unspecified complication ([facility] S08.007    Acute blood loss anemia D62    Anorectal pain K62.89    Anal stricture K62.4    Attention to ileostomy ([facility] Z43.2    CAD (coronary artery disease), native coronary artery I25.10    Chronic back pain M54.9, G89.29    Crohn's disease without complication ([facility] S05.80    Demand ischemia (HCC) I24.8    Dyslipidemia E78.5    Fatigue associated with anemia D64.9    History of anticoagulant therapy Z92.29    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    History of total right hip replacement Z96.641    History of ulcerative colitis Z87.19    Hypertension I10    Myeloproliferative disorder ([facility] D47.1    Other hyperlipidemia E78.49    Pain of right fibula M89.8X6    Pressure ulcer of buttock L89.309    Severe protein-calorie malnutrition (HCC) E43    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    Coronary artery disease involving native coronary artery with angina pectoris, unspecified whether native or transplanted heart (HCC) I25.119    Major depressive disorder, recurrent, mild F33.0    Major depressive disorder, recurrent, moderate F33.1    Major depressive disorder, recurrent, unspecified F33.9    Bronchitis, mucopurulent recurrent ([facility]) J41.1    Closed intertrochanteric fracture, left, initial encounter ([facility] C87.352D       Past Medical History:        Diagnosis Date    Arthritis     Crohn disease ([facility]     Diabetes mellitus ([facility]     Hyperlipidemia     Hypertension     Insomnia     Myelodysplastic disease ([facility]     Ulcerative colitis with intestinal obstruction ([facility]     Unspecified diseases of blood and blood-forming organs        Past Surgical History:        Procedure Laterality Date    ADENOIDECTOMY      CARPAL TUNNEL RELEASE Right     CERVICAL FUSION      COLON SURGERY      COLOSTOMY      EYE SURGERY Bilateral     CATARACT    HERNIA REPAIR      JOINT REPLACEMENT      rt and lt    JOINT REPLACEMENT Bilateral     JOINT REPLACEMENT Right     TONSILLECTOMY      TUBAL LIGATION         Social History:    Social History     Tobacco Use    Smoking status: Former     Packs/day: 1.00     Years: 45.00     Pack years: 45.00     Types: Cigarettes     Quit date:      Years since quittin.6    Smokeless tobacco: Never   Substance Use Topics    Alcohol use: No                                Counseling given: Not Answered      Vital Signs (Current):   Vitals:    22 1151 22 1500 22 1755   BP: (!) 192/91 (!) 159/68 (!) 180/75   Pulse: 62 90 83   Resp: 18 16 16   Temp: 97.9 °F (36.6 °C)  99 °F (37.2 °C)   TempSrc: Oral  Oral   SpO2:  96% 98%   Weight: 138 lb (62.6 kg)     Height: 5' 2\" (1.575 m)                                                BP Readings from Last 3 Encounters:   22 (!) 180/75   22 136/66   22 136/66       NPO Status:                                                                                 BMI:   Wt Readings from Last 3 Encounters:   22 138 lb (62.6 kg)   22 137 lb 3.2 oz (62.2 kg)   22 137 lb 3.2 oz (62.2 kg)     Body mass index is 25.24 kg/m².     CBC:   Lab Results   Component Value Date/Time    WBC 8.1 2022 02:43 PM    RBC 2.82 2022 02:43 PM    HGB 11.5 2022 02:43 PM    HCT 33.7 2022 02:43 PM    .5 2022 02:43 PM    RDW 13.9 2022 02:43 PM     2022 02:43 PM       CMP:   Lab Results   Component Value Date/Time     2022 02:43 PM    K 4.1 2022 02:43 PM     2022 02:43 PM    CO2 25 2022 02:43 PM    BUN 15 2022 02:43 PM    CREATININE 0.6 2022 02:43 PM    GFRAA >60 2022 (Myeloproliferative disorder ([facility])): arthritis: OA., .                 Abdominal:             Vascular: negative vascular ROS. Other Findings:           Anesthesia Plan      general and spinal     ASA 3       Induction: intravenous. MIPS: Postoperative opioids intended and Prophylactic antiemetics administered. Anesthetic plan and risks discussed with patient (prefers GA). Jaime Mcneill MD   9/2/2022      Patient will need to be re-evaluated prior to surgery by DOS anesthesiologist.    Jaime Mcneill MD           9/2/2022        7:32 PM      Chart reviewed and patient assessed. Agreed with above note.  Sandy DE LA TORRE CRNA

## 2022-09-02 NOTE — PROGRESS NOTES
Admission database completed to best of this RN's ability. Care plan and education initiated. Pt from home alone; lives in [facility] and daughter lives in [place] [facility] [place] \"12 steps away\". Uses a cane with ambulation when away from home. Denies any [place] [address] services prior to admission. Ileostomy present.

## 2022-09-03 ENCOUNTER — APPOINTMENT (OUTPATIENT)
Dept: GENERAL RADIOLOGY | Age: 77
DRG: 482 | End: 2022-09-03
Payer: MEDICARE

## 2022-09-03 LAB
ABO/RH: NORMAL
ANION GAP SERPL CALCULATED.3IONS-SCNC: 9 MMOL/L (ref 7–16)
ANTIBODY SCREEN: NORMAL
BASOPHILS ABSOLUTE: 0.01 E9/L (ref 0–0.2)
BASOPHILS RELATIVE PERCENT: 0.1 % (ref 0–2)
BUN BLDV-MCNC: 14 MG/DL (ref 6–23)
CALCIUM SERPL-MCNC: 8.8 MG/DL (ref 8.6–10.2)
CHLORIDE BLD-SCNC: 102 MMOL/L (ref 98–107)
CO2: 26 MMOL/L (ref 22–29)
CREAT SERPL-MCNC: 0.6 MG/DL (ref 0.5–1)
EOSINOPHILS ABSOLUTE: 0.01 E9/L (ref 0.05–0.5)
EOSINOPHILS RELATIVE PERCENT: 0.1 % (ref 0–6)
GFR AFRICAN AMERICAN: >60
GFR NON-AFRICAN AMERICAN: >60 ML/MIN/1.73
GLUCOSE BLD-MCNC: 129 MG/DL (ref 74–99)
HBA1C MFR BLD: 5.3 % (ref 4–5.6)
HCT VFR BLD CALC: 31.5 % (ref 34–48)
HEMOGLOBIN: 10.8 G/DL (ref 11.5–15.5)
IMMATURE GRANULOCYTES #: 0.06 E9/L
IMMATURE GRANULOCYTES %: 0.7 % (ref 0–5)
LYMPHOCYTES ABSOLUTE: 0.89 E9/L (ref 1.5–4)
LYMPHOCYTES RELATIVE PERCENT: 9.7 % (ref 20–42)
MCH RBC QN AUTO: 40.9 PG (ref 26–35)
MCHC RBC AUTO-ENTMCNC: 34.3 % (ref 32–34.5)
MCV RBC AUTO: 119.3 FL (ref 80–99.9)
METER GLUCOSE: 188 MG/DL (ref 74–99)
METER GLUCOSE: 189 MG/DL (ref 74–99)
MONOCYTES ABSOLUTE: 0.61 E9/L (ref 0.1–0.95)
MONOCYTES RELATIVE PERCENT: 6.6 % (ref 2–12)
NEUTROPHILS ABSOLUTE: 7.61 E9/L (ref 1.8–7.3)
NEUTROPHILS RELATIVE PERCENT: 82.8 % (ref 43–80)
PDW BLD-RTO: 13.5 FL (ref 11.5–15)
PLATELET # BLD: 232 E9/L (ref 130–450)
PMV BLD AUTO: 9.7 FL (ref 7–12)
POIKILOCYTES: ABNORMAL
POTASSIUM REFLEX MAGNESIUM: 4.2 MMOL/L (ref 3.5–5)
RBC # BLD: 2.64 E12/L (ref 3.5–5.5)
SODIUM BLD-SCNC: 137 MMOL/L (ref 132–146)
STOMATOCYTES: ABNORMAL
TARGET CELLS: ABNORMAL
WBC # BLD: 9.2 E9/L (ref 4.5–11.5)

## 2022-09-03 PROCEDURE — 2500000003 HC RX 250 WO HCPCS: Performed by: ORTHOPAEDIC SURGERY

## 2022-09-03 PROCEDURE — 51702 INSERT TEMP BLADDER CATH: CPT

## 2022-09-03 PROCEDURE — 36415 COLL VENOUS BLD VENIPUNCTURE: CPT

## 2022-09-03 PROCEDURE — 6360000002 HC RX W HCPCS: Performed by: ANESTHESIOLOGY

## 2022-09-03 PROCEDURE — 97161 PT EVAL LOW COMPLEX 20 MIN: CPT

## 2022-09-03 PROCEDURE — 0QS706Z REPOSITION LEFT UPPER FEMUR WITH INTRAMEDULLARY INTERNAL FIXATION DEVICE, OPEN APPROACH: ICD-10-PCS | Performed by: ORTHOPAEDIC SURGERY

## 2022-09-03 PROCEDURE — 1200000000 HC SEMI PRIVATE

## 2022-09-03 PROCEDURE — 97530 THERAPEUTIC ACTIVITIES: CPT

## 2022-09-03 PROCEDURE — 86901 BLOOD TYPING SEROLOGIC RH(D): CPT

## 2022-09-03 PROCEDURE — 85025 COMPLETE CBC W/AUTO DIFF WBC: CPT

## 2022-09-03 PROCEDURE — 27245 TREAT THIGH FRACTURE: CPT | Performed by: ORTHOPAEDIC SURGERY

## 2022-09-03 PROCEDURE — 97110 THERAPEUTIC EXERCISES: CPT

## 2022-09-03 PROCEDURE — 6370000000 HC RX 637 (ALT 250 FOR IP): Performed by: STUDENT IN AN ORGANIZED HEALTH CARE EDUCATION/TRAINING PROGRAM

## 2022-09-03 PROCEDURE — C1776 JOINT DEVICE (IMPLANTABLE): HCPCS | Performed by: ORTHOPAEDIC SURGERY

## 2022-09-03 PROCEDURE — 2709999900 HC NON-CHARGEABLE SUPPLY: Performed by: ORTHOPAEDIC SURGERY

## 2022-09-03 PROCEDURE — 3209999900 FLUORO FOR SURGICAL PROCEDURES

## 2022-09-03 PROCEDURE — 86850 RBC ANTIBODY SCREEN: CPT

## 2022-09-03 PROCEDURE — C1713 ANCHOR/SCREW BN/BN,TIS/BN: HCPCS | Performed by: ORTHOPAEDIC SURGERY

## 2022-09-03 PROCEDURE — 99222 1ST HOSP IP/OBS MODERATE 55: CPT | Performed by: ORTHOPAEDIC SURGERY

## 2022-09-03 PROCEDURE — 82962 GLUCOSE BLOOD TEST: CPT

## 2022-09-03 PROCEDURE — 80048 BASIC METABOLIC PNL TOTAL CA: CPT

## 2022-09-03 PROCEDURE — 6360000002 HC RX W HCPCS: Performed by: STUDENT IN AN ORGANIZED HEALTH CARE EDUCATION/TRAINING PROGRAM

## 2022-09-03 PROCEDURE — 73502 X-RAY EXAM HIP UNI 2-3 VIEWS: CPT

## 2022-09-03 PROCEDURE — 3600000003 HC SURGERY LEVEL 3 BASE: Performed by: ORTHOPAEDIC SURGERY

## 2022-09-03 PROCEDURE — 2500000003 HC RX 250 WO HCPCS

## 2022-09-03 PROCEDURE — C1769 GUIDE WIRE: HCPCS | Performed by: ORTHOPAEDIC SURGERY

## 2022-09-03 PROCEDURE — 6360000002 HC RX W HCPCS

## 2022-09-03 PROCEDURE — 6360000002 HC RX W HCPCS: Performed by: FAMILY MEDICINE

## 2022-09-03 PROCEDURE — 3600000013 HC SURGERY LEVEL 3 ADDTL 15MIN: Performed by: ORTHOPAEDIC SURGERY

## 2022-09-03 PROCEDURE — 86900 BLOOD TYPING SEROLOGIC ABO: CPT

## 2022-09-03 PROCEDURE — 7100000001 HC PACU RECOVERY - ADDTL 15 MIN: Performed by: ORTHOPAEDIC SURGERY

## 2022-09-03 PROCEDURE — 3700000001 HC ADD 15 MINUTES (ANESTHESIA): Performed by: ORTHOPAEDIC SURGERY

## 2022-09-03 PROCEDURE — 2580000003 HC RX 258: Performed by: STUDENT IN AN ORGANIZED HEALTH CARE EDUCATION/TRAINING PROGRAM

## 2022-09-03 PROCEDURE — 3700000000 HC ANESTHESIA ATTENDED CARE: Performed by: ORTHOPAEDIC SURGERY

## 2022-09-03 PROCEDURE — 2580000003 HC RX 258

## 2022-09-03 PROCEDURE — 87081 CULTURE SCREEN ONLY: CPT

## 2022-09-03 PROCEDURE — 2700000000 HC OXYGEN THERAPY PER DAY

## 2022-09-03 PROCEDURE — 2720000010 HC SURG SUPPLY STERILE: Performed by: ORTHOPAEDIC SURGERY

## 2022-09-03 PROCEDURE — 7100000000 HC PACU RECOVERY - FIRST 15 MIN: Performed by: ORTHOPAEDIC SURGERY

## 2022-09-03 PROCEDURE — 83036 HEMOGLOBIN GLYCOSYLATED A1C: CPT

## 2022-09-03 PROCEDURE — 6370000000 HC RX 637 (ALT 250 FOR IP): Performed by: INTERNAL MEDICINE

## 2022-09-03 DEVICE — SCREW BNE L40MM DIA5MM ST TIB LT GRN TI ST CANN LOK FULL: Type: IMPLANTABLE DEVICE | Site: HIP | Status: FUNCTIONAL

## 2022-09-03 DEVICE — SCREW BNE L85MM DIA10.35MM G TI CANN PERF FOR PROX FEM: Type: IMPLANTABLE DEVICE | Site: HIP | Status: FUNCTIONAL

## 2022-09-03 DEVICE — NAIL IM L170MM DIA9MM 125DEG SHT GRN PROX FEM TI: Type: IMPLANTABLE DEVICE | Site: HIP | Status: FUNCTIONAL

## 2022-09-03 RX ORDER — SODIUM CHLORIDE 9 MG/ML
25 INJECTION, SOLUTION INTRAVENOUS PRN
Status: DISCONTINUED | OUTPATIENT
Start: 2022-09-03 | End: 2022-09-03 | Stop reason: HOSPADM

## 2022-09-03 RX ORDER — NEOSTIGMINE METHYLSULFATE 1 MG/ML
INJECTION, SOLUTION INTRAVENOUS PRN
Status: DISCONTINUED | OUTPATIENT
Start: 2022-09-03 | End: 2022-09-03 | Stop reason: SDUPTHER

## 2022-09-03 RX ORDER — SODIUM CHLORIDE 9 MG/ML
INJECTION, SOLUTION INTRAVENOUS PRN
Status: DISCONTINUED | OUTPATIENT
Start: 2022-09-03 | End: 2022-09-07 | Stop reason: HOSPADM

## 2022-09-03 RX ORDER — MEPERIDINE HYDROCHLORIDE 25 MG/ML
12.5 INJECTION INTRAMUSCULAR; INTRAVENOUS; SUBCUTANEOUS
Status: COMPLETED | OUTPATIENT
Start: 2022-09-03 | End: 2022-09-03

## 2022-09-03 RX ORDER — GLYCOPYRROLATE 0.2 MG/ML
INJECTION INTRAMUSCULAR; INTRAVENOUS PRN
Status: DISCONTINUED | OUTPATIENT
Start: 2022-09-03 | End: 2022-09-03 | Stop reason: SDUPTHER

## 2022-09-03 RX ORDER — SODIUM CHLORIDE 0.9 % (FLUSH) 0.9 %
5-40 SYRINGE (ML) INJECTION PRN
Status: DISCONTINUED | OUTPATIENT
Start: 2022-09-03 | End: 2022-09-07 | Stop reason: HOSPADM

## 2022-09-03 RX ORDER — PROPOFOL 10 MG/ML
INJECTION, EMULSION INTRAVENOUS PRN
Status: DISCONTINUED | OUTPATIENT
Start: 2022-09-03 | End: 2022-09-03 | Stop reason: SDUPTHER

## 2022-09-03 RX ORDER — SODIUM CHLORIDE 9 MG/ML
INJECTION, SOLUTION INTRAVENOUS CONTINUOUS PRN
Status: DISCONTINUED | OUTPATIENT
Start: 2022-09-03 | End: 2022-09-03 | Stop reason: SDUPTHER

## 2022-09-03 RX ORDER — PHENYLEPHRINE HCL IN 0.9% NACL 1 MG/10 ML
SYRINGE (ML) INTRAVENOUS PRN
Status: DISCONTINUED | OUTPATIENT
Start: 2022-09-03 | End: 2022-09-03 | Stop reason: SDUPTHER

## 2022-09-03 RX ORDER — DICYCLOMINE HCL 20 MG
20 TABLET ORAL 2 TIMES DAILY PRN
Qty: 60 TABLET | Refills: 0 | Status: SHIPPED | OUTPATIENT
Start: 2022-09-03 | End: 2023-03-02

## 2022-09-03 RX ORDER — DEXAMETHASONE SODIUM PHOSPHATE 4 MG/ML
INJECTION, SOLUTION INTRA-ARTICULAR; INTRALESIONAL; INTRAMUSCULAR; INTRAVENOUS; SOFT TISSUE PRN
Status: DISCONTINUED | OUTPATIENT
Start: 2022-09-03 | End: 2022-09-03 | Stop reason: SDUPTHER

## 2022-09-03 RX ORDER — DEXTROSE MONOHYDRATE 100 MG/ML
INJECTION, SOLUTION INTRAVENOUS CONTINUOUS PRN
Status: DISCONTINUED | OUTPATIENT
Start: 2022-09-03 | End: 2022-09-07 | Stop reason: HOSPADM

## 2022-09-03 RX ORDER — SODIUM CHLORIDE 0.9 % (FLUSH) 0.9 %
5-40 SYRINGE (ML) INJECTION PRN
Status: DISCONTINUED | OUTPATIENT
Start: 2022-09-03 | End: 2022-09-03 | Stop reason: HOSPADM

## 2022-09-03 RX ORDER — CEFAZOLIN SODIUM 1 G/3ML
INJECTION, POWDER, FOR SOLUTION INTRAMUSCULAR; INTRAVENOUS PRN
Status: DISCONTINUED | OUTPATIENT
Start: 2022-09-03 | End: 2022-09-03 | Stop reason: SDUPTHER

## 2022-09-03 RX ORDER — ONDANSETRON 2 MG/ML
INJECTION INTRAMUSCULAR; INTRAVENOUS PRN
Status: DISCONTINUED | OUTPATIENT
Start: 2022-09-03 | End: 2022-09-03 | Stop reason: SDUPTHER

## 2022-09-03 RX ORDER — PROCHLORPERAZINE EDISYLATE 5 MG/ML
5 INJECTION INTRAMUSCULAR; INTRAVENOUS
Status: DISCONTINUED | OUTPATIENT
Start: 2022-09-03 | End: 2022-09-03 | Stop reason: HOSPADM

## 2022-09-03 RX ORDER — ROCURONIUM BROMIDE 10 MG/ML
INJECTION, SOLUTION INTRAVENOUS PRN
Status: DISCONTINUED | OUTPATIENT
Start: 2022-09-03 | End: 2022-09-03 | Stop reason: SDUPTHER

## 2022-09-03 RX ORDER — SODIUM CHLORIDE 0.9 % (FLUSH) 0.9 %
5-40 SYRINGE (ML) INJECTION EVERY 12 HOURS SCHEDULED
Status: DISCONTINUED | OUTPATIENT
Start: 2022-09-03 | End: 2022-09-03 | Stop reason: HOSPADM

## 2022-09-03 RX ORDER — ASPIRIN 81 MG/1
81 TABLET ORAL 2 TIMES DAILY
Qty: 30 TABLET | Refills: 3 | Status: SHIPPED | OUTPATIENT
Start: 2022-09-03 | End: 2022-09-06 | Stop reason: HOSPADM

## 2022-09-03 RX ORDER — SODIUM CHLORIDE 0.9 % (FLUSH) 0.9 %
5-40 SYRINGE (ML) INJECTION EVERY 12 HOURS SCHEDULED
Status: DISCONTINUED | OUTPATIENT
Start: 2022-09-03 | End: 2022-09-07 | Stop reason: HOSPADM

## 2022-09-03 RX ORDER — FENTANYL CITRATE 50 UG/ML
INJECTION, SOLUTION INTRAMUSCULAR; INTRAVENOUS PRN
Status: DISCONTINUED | OUTPATIENT
Start: 2022-09-03 | End: 2022-09-03 | Stop reason: SDUPTHER

## 2022-09-03 RX ORDER — LIDOCAINE HYDROCHLORIDE 20 MG/ML
INJECTION, SOLUTION EPIDURAL; INFILTRATION; INTRACAUDAL; PERINEURAL PRN
Status: DISCONTINUED | OUTPATIENT
Start: 2022-09-03 | End: 2022-09-03 | Stop reason: SDUPTHER

## 2022-09-03 RX ORDER — FENTANYL CITRATE 50 UG/ML
50 INJECTION, SOLUTION INTRAMUSCULAR; INTRAVENOUS EVERY 5 MIN PRN
Status: DISCONTINUED | OUTPATIENT
Start: 2022-09-03 | End: 2022-09-03 | Stop reason: HOSPADM

## 2022-09-03 RX ORDER — INSULIN LISPRO 100 [IU]/ML
0-10 INJECTION, SOLUTION INTRAVENOUS; SUBCUTANEOUS
Status: DISCONTINUED | OUTPATIENT
Start: 2022-09-03 | End: 2022-09-07 | Stop reason: HOSPADM

## 2022-09-03 RX ADMIN — CEFAZOLIN 2000 MG: 1 INJECTION, POWDER, FOR SOLUTION INTRAMUSCULAR; INTRAVENOUS at 07:49

## 2022-09-03 RX ADMIN — ROCURONIUM BROMIDE 30 MG: 10 INJECTION INTRAVENOUS at 07:42

## 2022-09-03 RX ADMIN — LIDOCAINE HYDROCHLORIDE 40 MG: 20 INJECTION, SOLUTION EPIDURAL; INFILTRATION; INTRACAUDAL; PERINEURAL at 07:42

## 2022-09-03 RX ADMIN — Medication 100 MCG: at 08:07

## 2022-09-03 RX ADMIN — HYDROCODONE BITARTRATE AND ACETAMINOPHEN 1 TABLET: 7.5; 325 TABLET ORAL at 17:41

## 2022-09-03 RX ADMIN — HYDROXYUREA 500 MG: 500 CAPSULE ORAL at 16:03

## 2022-09-03 RX ADMIN — ZOLPIDEM TARTRATE 5 MG: 5 TABLET ORAL at 22:30

## 2022-09-03 RX ADMIN — Medication 3 MG: at 08:41

## 2022-09-03 RX ADMIN — HYDROCODONE BITARTRATE AND ACETAMINOPHEN 1 TABLET: 7.5; 325 TABLET ORAL at 22:30

## 2022-09-03 RX ADMIN — FENTANYL CITRATE 50 MCG: 50 INJECTION, SOLUTION INTRAMUSCULAR; INTRAVENOUS at 07:42

## 2022-09-03 RX ADMIN — HYDROCODONE BITARTRATE AND ACETAMINOPHEN 1 TABLET: 7.5; 325 TABLET ORAL at 11:57

## 2022-09-03 RX ADMIN — MEPERIDINE HYDROCHLORIDE 12.5 MG: 25 INJECTION, SOLUTION INTRAMUSCULAR; INTRAVENOUS; SUBCUTANEOUS at 09:34

## 2022-09-03 RX ADMIN — SODIUM CHLORIDE: 9 INJECTION, SOLUTION INTRAVENOUS at 07:30

## 2022-09-03 RX ADMIN — HYDROMORPHONE HYDROCHLORIDE 0.25 MG: 1 INJECTION, SOLUTION INTRAMUSCULAR; INTRAVENOUS; SUBCUTANEOUS at 09:33

## 2022-09-03 RX ADMIN — WATER 2000 MG: 1 INJECTION INTRAMUSCULAR; INTRAVENOUS; SUBCUTANEOUS at 16:02

## 2022-09-03 RX ADMIN — MORPHINE SULFATE 2 MG: 2 INJECTION, SOLUTION INTRAMUSCULAR; INTRAVENOUS at 03:37

## 2022-09-03 RX ADMIN — INSULIN LISPRO 2 UNITS: 100 INJECTION, SOLUTION INTRAVENOUS; SUBCUTANEOUS at 17:42

## 2022-09-03 RX ADMIN — HYDROMORPHONE HYDROCHLORIDE 0.25 MG: 1 INJECTION, SOLUTION INTRAMUSCULAR; INTRAVENOUS; SUBCUTANEOUS at 09:22

## 2022-09-03 RX ADMIN — PROPOFOL 100 MG: 10 INJECTION, EMULSION INTRAVENOUS at 07:42

## 2022-09-03 RX ADMIN — ASPIRIN 325 MG: 325 TABLET, COATED ORAL at 11:56

## 2022-09-03 RX ADMIN — ONDANSETRON 4 MG: 2 INJECTION INTRAMUSCULAR; INTRAVENOUS at 08:01

## 2022-09-03 RX ADMIN — SODIUM CHLORIDE, PRESERVATIVE FREE 10 ML: 5 INJECTION INTRAVENOUS at 22:30

## 2022-09-03 RX ADMIN — CITALOPRAM HYDROBROMIDE 20 MG: 20 TABLET ORAL at 11:57

## 2022-09-03 RX ADMIN — DEXAMETHASONE SODIUM PHOSPHATE 10 MG: 4 INJECTION, SOLUTION INTRAMUSCULAR; INTRAVENOUS at 08:01

## 2022-09-03 RX ADMIN — INSULIN LISPRO 2 UNITS: 100 INJECTION, SOLUTION INTRAVENOUS; SUBCUTANEOUS at 22:31

## 2022-09-03 RX ADMIN — GLYCOPYRROLATE 0.6 MG: 0.2 INJECTION INTRAMUSCULAR; INTRAVENOUS at 08:41

## 2022-09-03 RX ADMIN — METOPROLOL TARTRATE 50 MG: 50 TABLET, FILM COATED ORAL at 11:57

## 2022-09-03 RX ADMIN — ROCURONIUM BROMIDE 50 MG: 10 INJECTION INTRAVENOUS at 08:56

## 2022-09-03 RX ADMIN — ATORVASTATIN CALCIUM 20 MG: 20 TABLET, FILM COATED ORAL at 11:56

## 2022-09-03 RX ADMIN — ASPIRIN 325 MG: 325 TABLET, COATED ORAL at 22:30

## 2022-09-03 ASSESSMENT — PAIN SCALES - GENERAL
PAINLEVEL_OUTOF10: 8
PAINLEVEL_OUTOF10: 7
PAINLEVEL_OUTOF10: 6
PAINLEVEL_OUTOF10: 0
PAINLEVEL_OUTOF10: 8
PAINLEVEL_OUTOF10: 7
PAINLEVEL_OUTOF10: 8
PAINLEVEL_OUTOF10: 9

## 2022-09-03 ASSESSMENT — PAIN - FUNCTIONAL ASSESSMENT
PAIN_FUNCTIONAL_ASSESSMENT: PREVENTS OR INTERFERES SOME ACTIVE ACTIVITIES AND ADLS
PAIN_FUNCTIONAL_ASSESSMENT: PREVENTS OR INTERFERES SOME ACTIVE ACTIVITIES AND ADLS

## 2022-09-03 ASSESSMENT — PAIN SCALES - WONG BAKER
WONGBAKER_NUMERICALRESPONSE: 0
WONGBAKER_NUMERICALRESPONSE: 8

## 2022-09-03 ASSESSMENT — PAIN DESCRIPTION - ORIENTATION
ORIENTATION: LEFT
ORIENTATION: LEFT

## 2022-09-03 ASSESSMENT — ENCOUNTER SYMPTOMS: SHORTNESS OF BREATH: 0

## 2022-09-03 ASSESSMENT — LIFESTYLE VARIABLES: SMOKING_STATUS: 0

## 2022-09-03 ASSESSMENT — PAIN DESCRIPTION - FREQUENCY: FREQUENCY: CONTINUOUS

## 2022-09-03 ASSESSMENT — PAIN DESCRIPTION - LOCATION
LOCATION: HIP
LOCATION: HIP

## 2022-09-03 ASSESSMENT — PAIN DESCRIPTION - ONSET: ONSET: ON-GOING

## 2022-09-03 ASSESSMENT — PAIN DESCRIPTION - DESCRIPTORS
DESCRIPTORS: ACHING
DESCRIPTORS: ACHING;DISCOMFORT

## 2022-09-03 ASSESSMENT — PAIN DESCRIPTION - PAIN TYPE: TYPE: ACUTE PAIN;SURGICAL PAIN

## 2022-09-03 NOTE — PROGRESS NOTES
Occupational Therapy  OT SESSION ATTEMPT     Date:9/3/2022  Patient Name: Nasir Salgado  MRN: [id number]  : 1945  Room: [address] [place] [address]-A     Attempted OT session this date:    [x] unavailable due to other medical staff currently with pt   [] on hold, await MRI/ neurosurgical recommendations. [] on hold per nursing staff secondary to lab / radiology results    [] declined Occupational Therapy  this date due to ___. Benefits of participation in therapy reviewed with pt.    [] off unit   [] Other:     Will reattempt OT Evaluation  at a later time. Thank you for the opportunity to assist in the care of this patient. Occupational Therapy (OT) order received, patient's medical record reviewed, and OT evaluation attempted this date; patient declined to participate in ADLs and other OOB activities, despite encouragement.  OT evaluatio to be re-attempted at later time/date, as able/appropriate       [employer] OTR/L  #[id number]

## 2022-09-03 NOTE — OP NOTE
OPERATIVE REPORT    PATIENT:  Raji Taylor   [id number]    DATE OF PROCEDURE:  9/3/22    SURGEON: Sarah García MD    ASSISTANT:  Robin Reyes DO, resident assisting     PREOPERATIVE DIAGNOSIS:  Left hip intertrochanteric  fracture     POSTOPERATIVE DIAGNOSIS: Left hip intertrochanteric  fracture     OPERATION:  Left hip reduction and fixation with cephalomedullary nail    ANESTHESIA: . general    OPERATIVE INDICATIONS:  The patient is a 76year old female community ambulator at baseline without assistance who suffered a fall from standing height yesterday. She was brought to the emergency department where x-rays showed a minimally displaced intertrochanteric fracture. This involve the left hip. She had previous bilateral knee replacements and right total hip replacement from which she has done well. .   It was recommended the patient undergo operative fixation to allow early mobilization and weight bearing. The risks, benefits, and alternatives to the procedure were discussed at length with the patient and his family members. These risks include, but are not limited to infection, nerve and/or blood vessel injury, intra or post operative fracture, non-union, mal-union, need for revision surgery,  failure of implants, deep vein thrombosis and pulmonary embolism, and the risks of general anesthesia provided by the anesthesiologist.   The patient understood these risks, signed an informed consent, and elected to proceed    OPERATIVE FINDINGS:   There was an an intertrochanteric fracture. Fracture did not extend below the lesser trochanter and was a stable pattern. Bone quality was excellent    OPERATIVE PROCEDURE: The patient was brought to the operating room. She was intubated in Her hospital bed and then transferred from hospital bed to the fracture table.   The patient was placed supine, with perineum snug against a well padded post.  The operative and non operative legs were placed in leg holders and were well padded. The legs were scissored to allow fluroscopy C arm access to the operative leg. Reduction was performed by applying traction, adduction, and internal rotation. Reduction was confirmed on AP and lateral fluoroscopic images and found to be anatomic. The leg was locked in place, and the surgical site was prepped and draped in sterile fashion. Next, a surgical time out was performed which included all members of the [address] [place] [address] team, to correctly identify patient, indicated surgery, and site of surgery. Prior to incision, 2 grams ancef was given for antibiotic prophylaxis. Fluoroscopy was used to determine incision location. Utilizing a #10 blade, a 5cm incision was made posterior and proximal to the tip of the greater trochanter. The incision was carried down through subcutaneous fat to the fascia. The fascia was incised in line with the skin incision. The tip of the greater trochanter was identified on fluoroscopy and a guide pin was placed at the tip of the trochanter. The pin was advanced to below the lesser trochanter, and was confirmed to be in appropriate position on AP and centered on lateral fluoroscopy images. Next, the entry reamer was placed over the guide pin, with soft tissue protector in place. The reamer was then advanced to below the level of the lesser trochanter. A 9 mm short TFN a was opened. The nail was assembled on the  handle and advanced by hand to the appropriate levell. As the nail was inserted, fluoscopy images were obtained intermittently. Once the nail was advanced to a level that would allow appropriate lag screw placement into the center of the femoral head, along the medial calcar, the trocar sleeve for the lag screw was placed and skin incision made. The trocar was placed down on bone and the drill guide pin was placed through the nail into the center of the head on AP and lateral views.   The pin was inserted to ensure tip apex distance less than 25 mm as measured on AP and lateral views by adding the distance from pin tip to edge of femoral head. The screw length was then determined to be 85. The drill for the lag screw was set to appropriate depth and the drill was advanced to a positive stop. The drill was removed, and a lag screw size 85 was placed by hand. Final position was confirmed on AP and lateral views. The set screw was placed and the screw  and trocar were removed. The insertion handle was removed. Next, the insertion handle of the nail was adjusted to place our distal interlock screw in the static position. The skin was incised and trocar placed down on bone. The femoral shaft was drilled and measured and a size 5 x 40 mm interlocking screw was placed by hand. Final images of the hip, fracture site, and distal extent of the nail were obtained. The wounds were copiously irrigated with lactated ringers. The proximal wound was closed via O vicryl for fascia, 2-O vicryl for subcutaneus layer, and staples for skin. The lag screw and distal interlock incisions were closed with subcutaneous 2-O vicryl and staples. Sterile dressing was applied. The patient was transferred off of the fracture table and back onto the hospital bed. The patient was awoken from anesthesia and taken to the PACU in stable condition. IMPLANTS:   Implant Name Type Inv.  Item Serial No.  Lot No. LRB No. Used Action   NAIL IM L170MM DIA9MM 125DEG SHT GRN PROX FEM TI - [id number]  NAIL IM L170MM DIA9MM 125DEG SHT GRN PROX FEM TI  [employer]- [id number] Left 1 Implanted   SCREW BNE L85MM DIA10.35MM G TI CINDY PERF FOR PROX FEM - [id number]  SCREW BNE L85MM DIA10.35MM G TI CINDY PERF FOR PROX FEM  [employer]- [id number] Left 1 Implanted   SCREW BNE L40MM DIA5MM ST TIB LT GRN TI ST CINDY ANKIT FULL - [id number]  SCREW BNE L40MM DIA5MM ST TIB LT GRN TI ST CINDY ANKIT FULL  [employer]- [id number] Left 1 Implanted ESTIMATED BLOOD LOSS: 111 mL    COMPLICATIONS: none    POST OPERATIVE PLAN: The patient will be transferred to the orthopaedic floor from PACU once stable. Post operative antibiotics will be continued for 24 hours. Physical therapy will begin immediately, with weight bearing as tolerated. DVT prophylaxis will be with SCD's starting today, and ASA twice daily tomorrow. I anticipate discharge to home/rehab within the first 3 post operative days.        Lolis Nash MD  Orthopaedic Surgery   9/3/22  8:44 AM

## 2022-09-03 NOTE — DISCHARGE INSTRUCTIONS
Orthopedics Discharge Instructions   Weight bearing Status -full weight bearing - Operative Extremity  Pain medication Per Prescription  Ice to operative/injured site for 15-30 minutes of each hour for next 3-5 days           Elevate operative/injured limb on 2 pillows at home  Continue DVT Prophylaxis as Prescribed  Follow Up in Office in 2 weeks with Dr. Barrington Guerra , APT: call for appointment      Call the office at for directions or with any questions. Watch for these significant complications. Call physician if they or any other problems occur:  Fever over 101°, redness, swelling or warmth at the operative site  Unrelieved nausea                          Foul smelling or cloudy drainage at the operative site       Unrelieved pain                   Blood soaked dressing.  (Some oozing may be normal)                Numb, pale, blue, cold or tingling extremity     Future Appointments   Date Time Provider Brent Rueda   10/10/2022  1:00 PM DO TEA [facility]   7/31/2023  1:00 PM DO TEA [facility]

## 2022-09-03 NOTE — H&P
Hospital Medicine History & Physical      PCP: Les Ordoñez DO    Date of Admission: 9/2/2022    Date of Service: . SEPT 3, 2022    Chief Complaint:   FALL      History Of Present Illness:     76 y.o. female presented with  FALL, SUSTAINED A FRACTURE OF HER LEFT HIP. WENT FOR OR. STATES SHE WAS REACHING FOR SOMETHING AND FELL HIT HER HEAD ON THE FLOOR, DENIES LOC. . WENT FOR OR THIS AM    Past Medical History:          Diagnosis Date    Arthritis     Crohn disease ([facility] [address]     Diabetes mellitus ([facility] [address]     Hyperlipidemia     Hypertension     Insomnia     Myelodysplastic disease ([facility] [address]     Ulcerative colitis with intestinal obstruction ([facility] [address]     Unspecified diseases of blood and blood-forming organs        Past Surgical History:          Procedure Laterality Date    ADENOIDECTOMY      CARPAL TUNNEL RELEASE Right     CERVICAL FUSION      COLON SURGERY      COLOSTOMY      EYE SURGERY Bilateral     CATARACT    HERNIA REPAIR      JOINT REPLACEMENT      rt and lt    JOINT REPLACEMENT Bilateral     JOINT REPLACEMENT Right     TONSILLECTOMY      TUBAL LIGATION         Medications Prior to Admission:      Prior to Admission medications    Medication Sig Start Date End Date Taking?  Authorizing Provider   aspirin 81 MG EC tablet Take 1 tablet by mouth in the morning and at bedtime 9/3/22  Yes Alireza Herrmann, DO   dicyclomine (BENTYL) 20 MG tablet Take 1 tablet by mouth 2 times daily as needed (ibs) 9/3/22 3/2/23 Yes Alireza Herrmann,    furosemide (LASIX) 20 MG tablet Take 1 tablet by mouth daily 8/24/22 2/20/23  Les Ordoñez, DO   simvastatin (ZOCOR) 40 MG tablet Take 1 tablet by mouth nightly 8/24/22 11/22/22  Les Ordoñez, DO   zolpidem (AMBIEN) 5 MG tablet take 1 tablet by mouth nightly 8/24/22 11/16/22  Les Ordoñez, DO   LORazepam (ATIVAN) 0.5 MG tablet Take 1 tablet by mouth daily as needed. 8/16/22   Historical Provider, MD   HYDROcodone-acetaminophen (NORCO) 7.5-325 MG per tablet Take 1 tablet by mouth in the morning and 1 tablet at noon and 1 tablet in the evening and 1 tablet before bedtime. Do all this for 30 days. 8/12/22 9/11/22  Lexi Larkin DO   albuterol sulfate HFA (PROVENTIL HFA) 108 (90 Base) MCG/ACT inhaler Inhale 2 puffs into the lungs every 6 hours as needed for Wheezing 7/25/22   Lexi Larkin DO   metoprolol tartrate (LOPRESSOR) 50 MG tablet Take 1 tablet by mouth daily 5/12/22   [facility] APRN - CNP   cholestyramine Lisette Fee) 4 g packet Take 1 packet by mouth daily 3/10/22   Historical Provider, MD   omeprazole (PRILOSEC) 40 MG delayed release capsule Take 1 capsule by mouth daily 4/20/22 10/17/22  Lexi Larkin DO   hydroxyurea (HYDREA) 500 MG chemo capsule Take 1 capsule by mouth daily 4/20/22 10/17/22  Lexi Larkin DO   folic acid (FOLVITE) 1 MG tablet Take 1 tablet by mouth daily 2/17/22 9/2/22  Greta Newberry APRN - CNP   citalopram (CELEXA) 40 MG tablet Take 1 tablet by mouth daily  Patient taking differently: Take 20 mg by mouth daily 10/1/21 9/2/22  Lexi Larkin DO   Handicap Placard MISC Permanent disability   5 years 5/17/21   Lexi Larkin DO   diclofenac sodium (VOLTAREN) 1 % GEL Apply 2 g topically 2 times daily as needed    Historical Provider, MD   Cholecalciferol (VITAMIN D3 PO) Take 1,000 Units by mouth daily    Historical Provider, MD   Multiple Vitamins-Minerals (THERAPEUTIC MULTIVITAMIN-MINERALS) tablet Take 1 tablet by mouth daily    Historical Provider, MD       Allergies:  Doxycycline hyclate, Cataflam [diclofenac], Celebrex [celecoxib], Lamisil [terbinafine], Levaquin [levofloxacin], and Lipitor [atorvastatin calcium]    Social History:      The patient currently lives ALONE    TOBACCO:   reports that she quit smoking about 22 years ago. Her smoking use included cigarettes. She has a 45.00 pack-year smoking history.  She has never used smokeless tobacco.  ETOH:   reports no history of alcohol use. Family History:       Reviewed in detail and negative for DM, CAD, Cancer, CVA. Positive as follows:        Problem Relation Age of Onset    Breast Cancer Mother     Diabetes Father     Heart Failure Sister     Diabetes Sister        REVIEW OF SYSTEMS:   Pertinent positives as noted in the HPI. All other systems reviewed and negative. PHYSICAL EXAM:    /62   Pulse 89   Temp 98.2 °F (36.8 °C) (Oral)   Resp 12   Ht 5' 2\" (1.575 m)   Wt 138 lb (62.6 kg)   SpO2 100%   BMI 25.24 kg/m²     General appearance:  No apparent distress, appears stated age and cooperative. HEENT:  Normal cephalic, atraumatic without obvious deformity. Pupils equal, round, and reactive to light. Extra ocular muscles intact. Conjunctivae/corneas clear. HEMATOMA RIGHT TEMPORAL AREA, ECCHYMTOIC  Neck: Supple, with full range of motion. No jugular venous distention. Trachea midline. Respiratory:  Normal respiratory effort. Clear to auscultation, bilaterally without Rales/Wheezes/Rhonchi. Cardiovascular:  Regular rate and rhythm   Abdomen: Soft, non-tender, non-distended with normal bowel sounds. COLOSTOMY INTACT, WITH BROWN STOOL  Musculoskeletal:  No clubbing, cyanosis or edema bilaterally. DSD INTACT LEFT HIP   Skin: Skin color, texture, turgor normal.  No rashes or lesions. Neurologic:  Neurovascularly intact without any focal sensory/motor deficits. Cranial nerves: II-XII intact, grossly non-focal.  Psychiatric:  Alert and oriented, thought content appropriate, normal insight        Labs:     Recent Labs     09/02/22  1443 09/03/22  0130   WBC 8.1 9.2   HGB 11.5 10.8*   HCT 33.7* 31.5*    232     Recent Labs     09/02/22  1443 09/03/22  0130    137   K 4.1 4.2    102   CO2 25 26   BUN 15 14   CREATININE 0.6 0.6   CALCIUM 9.0 8.8     No results for input(s): AST, ALT, BILIDIR, BILITOT, ALKPHOS in the last 72 hours.   Recent Labs     09/02/22  1443   INR 1.1     No results for input(s): Courtney King in the last 72 hours. Urinalysis:      Lab Results   Component Value Date/Time    NITRU Negative 04/29/2014 12:22 PM    [address] Rub[address] Yi NONE 04/29/2014 12:22 PM    BACTERIA NONE 04/29/2014 12:22 PM    Clifton Inoue NONE 04/29/2014 12:22 PM    BLOODU Negative 04/29/2014 12:22 PM    [address] 1.025 04/29/2014 12:22 PM    GLUCOSEU 250 04/29/2014 12:22 PM       Radiology:     XR HIP LEFT (2-3 VIEWS)   Final Result   Fixation of an intertrochanteric fracture of the left femur with satisfactory   alignment of the fracture no hardware complication. FLUORO FOR SURGICAL PROCEDURES   Final Result   Left hip screw placement. XR HIP 2-3 VW W PELVIS LEFT   Final Result   Nondisplaced intertrochanteric fracture on the left. XR FEMUR LEFT (MIN 2 VIEWS)   Final Result   Nondisplaced intertrochanteric fracture on the left. XR TIBIA FIBULA LEFT (2 VIEWS)   Final Result   No acute bony abnormalities or prosthetic complications. CT Head WO Contrast   Final Result   1. There is no acute intracranial abnormality. Specifically, there is no   intracranial hemorrhage. 2. Atrophy and periventricular leukomalacia,   3. Small right frontal scalp contusion. .         CT Cervical Spine WO Contrast   Final Result   1. There is no acute compression fracture or subluxation of the cervical   spine. 2. Multilevel degenerative disc and degenerative joint disease. 3. Previous anterior fusion of C3 through C6. There is no hardware   complication. CT LUMBAR SPINE WO CONTRAST   Final Result   1. No acute fracture or subluxation. 2. Marked bony demineralization with degenerative changes throughout the   lumbar spine. There is multilevel central canal stenosis and neural   foraminal narrowing. 3. Cholelithiasis. 4. Left nonobstructive nephrolithiasis.              ASSESSMENT:    Active Hospital Problems    Diagnosis Date Noted Closed intertrochanteric fracture, left, initial encounter ([address] Usha Coon 09/02/2022     Priority: Medium   CROHN'S DISEASE   H/O COLOSTOMY   HLD   HTN   II DM        PLAN:  PAIN MANAGEMENT   LOPRESSOR   HYDREA   LASIX   LIPITOR   SSI       DVT Prophylaxis:   SCD  Diet: ADULT DIET; Regular  Code Status: FULL CODE    PT/OT Eval Status: ORDERED    Dispo - LAURYN    Electronically signed by Kathya Johnson DO on 9/3/2022 at 3:47 PM FACOI       Thank you Dell Kam DO for the opportunity to be involved in this patient's care.  If you have any questions or concerns please feel free to contact me at [phone]

## 2022-09-03 NOTE — CONSULTS
Department of Orthopedic Surgery  Attending Consult Note        Reason for Consult: Left hip fracture  Requesting Physician: Emergency department    CHIEF COMPLAINT: Left hip pain     History Obtained From:  patient    HISTORY OF PRESENT ILLNESS:                The patient is a 76 y.o. female who presents with left hip pain after ground-level fall. She is a community ambulator at baseline. She states that she lost her balance and fell over. She was unable to ambulate afterwards. X-ray showed a left hip intertrochanteric fracture. She is admitted to the hospital and orthopedics is consulted for management. She denies any previous left hip pain. She does have history of knee replacements in both knees as well as her right hip replacement done 4 to 5 years ago by Dr. Husam Martínez in [facility].     Past Medical History:        Diagnosis Date    Arthritis     Crohn disease ([address]     Diabetes mellitus ([address]     Hyperlipidemia     Hypertension     Insomnia     Myelodysplastic disease ([address]     Ulcerative colitis with intestinal obstruction ([address]     Unspecified diseases of blood and blood-forming organs        Past Surgical History:        Procedure Laterality Date    ADENOIDECTOMY      CARPAL TUNNEL RELEASE Right     CERVICAL FUSION      COLON SURGERY      COLOSTOMY      EYE SURGERY Bilateral     CATARACT    HERNIA REPAIR      JOINT REPLACEMENT      rt and lt    JOINT REPLACEMENT Bilateral     JOINT REPLACEMENT Right     TONSILLECTOMY      TUBAL LIGATION         Current Medications:   Current Facility-Administered Medications: ceFAZolin (ANCEF) 2,000 mg in sterile water 20 mL IV syringe, 2,000 mg, IntraVENous, See Admin Instructions  0.9 % sodium chloride infusion, , IntraVENous, Continuous  sodium chloride flush 0.9 % injection 5-40 mL, 5-40 mL, IntraVENous, 2 times per day  sodium chloride flush 0.9 % injection 5-40 mL, 5-40 mL, IntraVENous, PRN  0.9 % sodium chloride infusion, , IntraVENous, PRN  ceFAZolin (ANCEF) 2,000 mg in sterile water 20 mL IV syringe, 2,000 mg, IntraVENous, See Admin Instructions  [Held by provider] aspirin EC tablet 81 mg, 81 mg, Oral, Daily  citalopram (CELEXA) tablet 20 mg, 20 mg, Oral, Daily  furosemide (LASIX) tablet 20 mg, 20 mg, Oral, Daily  HYDROcodone-acetaminophen (NORCO) 7.5-325 MG per tablet 1 tablet, 1 tablet, Oral, 4x Daily  hydroxyurea (HYDREA) chemo capsule 500 mg, 500 mg, Oral, Daily  metoprolol tartrate (LOPRESSOR) tablet 50 mg, 50 mg, Oral, Daily  atorvastatin (LIPITOR) tablet 20 mg, 20 mg, Oral, Daily  zolpidem (AMBIEN) tablet 5 mg, 5 mg, Oral, Nightly  sodium chloride flush 0.9 % injection 5-40 mL, 5-40 mL, IntraVENous, 2 times per day  sodium chloride flush 0.9 % injection 10 mL, 10 mL, IntraVENous, PRN  0.9 % sodium chloride infusion, , IntraVENous, PRN  ondansetron (ZOFRAN-ODT) disintegrating tablet 4 mg, 4 mg, Oral, Q8H PRN **OR** ondansetron (ZOFRAN) injection 4 mg, 4 mg, IntraVENous, Q6H PRN  polyethylene glycol (GLYCOLAX) packet 17 g, 17 g, Oral, Daily PRN  acetaminophen (TYLENOL) tablet 650 mg, 650 mg, Oral, Q6H PRN **OR** acetaminophen (TYLENOL) suppository 650 mg, 650 mg, Rectal, Q6H PRN  morphine (PF) injection 2 mg, 2 mg, IntraVENous, Q4H PRN  potassium chloride (KLOR-CON M) extended release tablet 40 mEq, 40 mEq, Oral, PRN **OR** potassium bicarb-citric acid (EFFER-K) effervescent tablet 40 mEq, 40 mEq, Oral, PRN **OR** potassium chloride 10 mEq/100 mL IVPB (Peripheral Line), 10 mEq, IntraVENous, PRN  albuterol (PROVENTIL) nebulizer solution 2.5 mg, 2.5 mg, Nebulization, Q6H PRN    Allergies:  Doxycycline hyclate, Cataflam [diclofenac], Celebrex [celecoxib], Lamisil [terbinafine], Levaquin [levofloxacin], and Lipitor [atorvastatin calcium]    Social History:   TOBACCO:   reports that she quit smoking about 22 years ago. Her smoking use included cigarettes. She has a 45.00 pack-year smoking history.  She has never used smokeless tobacco.  ETOH:   reports no history of alcohol use.  DRUGS:   reports no history of drug use. Family History:       Problem Relation Age of Onset    Breast Cancer Mother     Diabetes Father     Heart Failure Sister     Diabetes Sister        REVIEW OF SYSTEMS:       Constitutional:  Negative for weight loss, fevers, chills, fatigue  Cardiovascular: Negative for chest pain, palpitations  Pulmonary: Negative for shortness of breath, labored breathing, cough  GI: negative for abdominal pain, nausea, vomitting   MSK: per HPI  Skin: negative for rash, open wounds    All other systems reviewed and are negative         PHYSICAL EXAM:      Vitals:    09/02/22 2015 09/02/22 2112 09/02/22 2231 09/03/22 0337   BP: (!) 156/66      Pulse: 86      Resp: 16 16 16 16   Temp: 98.6 °F (37 °C)      TempSrc: Oral      SpO2: 99%      Weight:       Height:           Height: [unfilled]  Weight: [unfilled]  BMI:  Body mass index is 25.24 kg/m². General: The patient is alert and oriented x 3, appears to be stated age and in no distress. HEENT: head is normocephalic, atraumatic. EOMI. Neck: supple, trachea midline, no thyromegaly   Cardiovascular: peripheral pulses palpable. Normal Capillary refill   Respiratory: breathing unlabored, chest expansion symmetric   GI: abdomen NT/ND. No masses   Skin: no rash, no open wounds, no erythema  Psych: normal affect; mood stable  MSK:  Exam of the left hip shows pain with any attempts at logroll or range of motion. She has intact motor and sensory function of the foot.   Skin is intact        DATA:      CBC:   Lab Results   Component Value Date/Time    WBC 9.2 09/03/2022 01:30 AM    RBC 2.64 09/03/2022 01:30 AM    HGB 10.8 09/03/2022 01:30 AM    HCT 31.5 09/03/2022 01:30 AM    .3 09/03/2022 01:30 AM    MCH 40.9 09/03/2022 01:30 AM    MCHC 34.3 09/03/2022 01:30 AM    RDW 13.5 09/03/2022 01:30 AM     09/03/2022 01:30 AM    MPV 9.7 09/03/2022 01:30 AM     BMP:    Lab Results   Component Value Date/Time     09/03/2022 01:30 AM    K 4.2 09/03/2022 01:30 AM     09/03/2022 01:30 AM    CO2 26 09/03/2022 01:30 AM    BUN 14 09/03/2022 01:30 AM    LABALBU 4.4 07/21/2022 02:11 PM    CREATININE 0.6 09/03/2022 01:30 AM    CALCIUM 8.8 09/03/2022 01:30 AM    GFRAA >60 09/03/2022 01:30 AM    LABGLOM >60 09/03/2022 01:30 AM    GLUCOSE 129 09/03/2022 01:30 AM     PT/INR:    Lab Results   Component Value Date/Time    PROTIME 11.4 09/02/2022 02:43 PM    PROTIME 11.2 08/03/2011 11:20 AM    INR 1.1 09/02/2022 02:43 PM     PTT:    Lab Results   Component Value Date/Time    APTT 32.5 03/28/2016 12:13 AM   [APTT}  U/A:    Lab Results   Component Value Date/Time    COLORU Yellow 04/29/2014 12:22 PM    PHUR 6.0 04/29/2014 12:22 PM    WBCUA NONE 04/29/2014 12:22 PM    RBCUA NONE 04/29/2014 12:22 PM    BACTERIA NONE 04/29/2014 12:22 PM    CLARITYU Clear 04/29/2014 12:22 PM    SPECGRAV 1.025 04/29/2014 12:22 PM    LEUKOCYTESUR Negative 04/29/2014 12:22 PM    UROBILINOGEN 0.2 04/29/2014 12:22 PM    BILIRUBINUR Negative 04/29/2014 12:22 PM    BLOODU Negative 04/29/2014 12:22 PM    GLUCOSEU 250 04/29/2014 12:22 PM       Radiology Review: X-rays show minimally displaced intertrochanteric fracture of the left hip    Other diagnostic test: Not applicable    IMPRESSION/RECOMMENDATIONS:    76y.o. year old female with left hip intertrochanteric fracture  -Admitted to medicine service  -Plan for operative management this morning in the form of cephalomedullary nail. This will allow immediate mobilization and improved pain control. Risks of surgery discussed with the patient and her family members today. Risks include but are not limited to infection, neurovascular injury, nonunion, malunion, need for revision surgery, periprosthetic fracture, DVT/pulmonary embolus, death from anesthesia, unforeseen risks.   They understand and would like to proceed    Gabi Aranda MD  Orthopaedic Surgery   9/3/22  7:32 AM

## 2022-09-03 NOTE — PROGRESS NOTES
Initial Evaluation        Attending Provider:  Mary Roca DO    Evaluating PT:  Karen Rising PT, RRT, CEAS    Room #:  4685/8918-U  Diagnosis:  S/P fall with Left hip intertrochanteric fracture  Pertinent PMHx/PSHx:  B/L TKA, RT MACKENZIE  Procedure/Surgery:  Left hip reduction and fixation with cephalomedullary nail 9/3/22  Precautions:  WBAT, falls,   Equipment Needs:  [employer]    SUBJECTIVE:    Pt lives with alone in a 1 story home with 0 stairs and 0 rail to enter. Pt ambulated with SC when outside home PTA. OBJECTIVE:   Initial Evaluation  Date: 9/3/22 AM Treatment Short Term/ Long Term   Goals   Was pt agreeable to Eval/treatment? Yes     Does pt have pain? LT Hip     Bed Mobility  Rolling: NA  Supine to sit: Mod  Sit to supine: NA  Scooting: Mod   Roll: S/Indep   Sup-sit: Min/CGA   Sit-sup: Min   Scoot: Indep   Transfers Sit to stand: Min x 2  Stand to sit: Min x 2  Stand pivot: NA  CGA/SBA x 1    Ambulation   5 feet with [employer] Min x 2   feet with [employer] CGA/SBA   Stair negotiation: ascended and descended  NA      ROM See comments below     MMT LT hip 3-/5, knee 4/5  Imp LTLE . 5 to 1 grade globally   AM-PAC 6 Clicks 89/78       Pt is A & O x 3   Sensation:  Pt denies numbness and tingling to LT LE  Edema:  Post operative LTLE  Balance: sitting:Indep and standing: requiring Min A x 2-1  Endurance: Functional, no baseline issues, no NC02 home use    Patient education  Pt educated on WBAT tolerances, bed mobility tech, transfer hand place, Imp of ROM while in chair     Patient response to education:   Pt verbalized understanding Pt demonstrated skill Pt requires further education in this area   Y y Review PRN     ASSESSMENT:    Comments:  in bed on arrival receptive for getting OOB into chair. Pain under control reported. Bed mobility to EOB was mod assisted 2/2 pain, weakness and just recently return from PACU. Reported dizziness from the sup-sit which is not her baseline; sat good 5' to allow recovery. She states was mild dizziness. Transfer to stand was Min x 2 assisted w/o a staggered transfer. No dizziness reported with the sit-stand transfer. Amb with [employer] and assist of 2 Min to bedside chair recliner. Inc pain and fatigue related to transfer with a inc hands on to help control task. Difficulty with WT shift LT to advance RT foot. Only able to tolerate the 5'. Inst on recliner chair usage. There-ex in chair well tolerated; AA hip flex, LAQ, heel raises x 20 ea. B/L knee flex post TKA LT 90* and RT 95*. LT hip flex AA/P 60*. Footrest with pillow topper for comfort. Call light and wall phone on bed to LT and easily reachable. Tray to RT side and height adjusted for chair sitting. Very appreciative for care. Treatment:  Patient practiced and was instructed in the following treatment:    Eval  Bed mob Transfers  There-x  Transfers  Education    Pt's/ family goals   1. Not addressed    Patient and or family understand(s) diagnosis, prognosis, and plan of care. PLAN:    PT care will be provided in accordance with the objectives noted above. Exercises and functional mobility practice will be used as well as appropriate assistive devices or modalities to obtain goals. Patient and family education will also be administered as needed. Frequency of treatments: Daily BID    Total Treatment Time  40 minutes     Evaluation Time includes thorough review of current medical information, gathering information on past medical history/social history and prior level of function, completion of standardized testing/informal observation of tasks, assessment of data and education on plan of care and goals.     CPT codes:  [x] Low Complexity PT evaluation 36186  [] Moderate Complexity PT evaluation 56181  [] High Complexity PT evaluation 63157  [] PT Re-evaluation 94008  [] Gait training 88518 ** minutes  [] Manual therapy 87168 ** minutes  [] Therapeutic activities 28551 ** minutes  [x] Therapeutic exercises 50203 ** minutes  [] Neuromuscular reeducation 50476 ** minutes    Rudolph Loving PT, RRT, CEAS

## 2022-09-03 NOTE — PROGRESS NOTES
Daily Treat        Attending Provider:  Toña Lara DO    Evaluating PT:  Michelle Nick PT, RRT, CEAS    Room #:  2472/9654-M  Diagnosis:  S/P fall with Left hip intertrochanteric fracture  Pertinent PMHx/PSHx:  B/L TKA, RT MACKENZIE  Procedure/Surgery:  Left hip reduction and fixation with cephalomedullary nail 9/3/22  Precautions:  WBAT, falls,   Equipment Needs:  [employer]    SUBJECTIVE:    Pt lives with alone in a 1 story home with 0 stairs and 0 rail to enter. Pt ambulated with SC when outside home PTA. OBJECTIVE:    Treatment  Date: 9/3/22 PM Treatment Short Term/ Long Term   Goals   Was pt agreeable to Eval/treatment? Yes     Does pt have pain? LT Hip     Bed Mobility  Rolling: NA  Supine to sit:    Sit to supine: Mod/max x 2  Scooting: Mod   Roll: S/Indep   Sup-sit: Min/CGA   Sit-sup: Min   Scoot: Indep   Transfers Sit to stand: Min x 2  Stand to sit: Min x 2  Stand pivot: NA  CGA/SBA x 1    Ambulation   5 feet with [employer] Min x 2 chair-bed   feet with [employer] CGA/SBA   Stair negotiation: ascended and descended  NA      ROM See comments below     MMT LT hip 3-/5, knee 4/5  Imp LTLE . 5 to 1 grade globally   AM-PAC 6 Clicks 78/74       Pt is A & O x 3   Sensation:  Pt denies numbness and tingling to LT LE  Edema:  Post operative LTLE  Balance: sitting:Indep and standing: requiring Min A x 2-1  Endurance: Functional, no baseline issues, no NC02 home use    There-ex: LT hip flex AA, LAQ, AP, heel raises x 20 ea      Patient education  Pt educated on WBAT tolerances, bed mobility tech, transfer hand place, Imp of ROM while in chair     Patient response to education:   Pt verbalized understanding Pt demonstrated skill Pt requires further education in this area   Y y Review PRN     ASSESSMENT:    Comments 9/3/22 PM: Remained in chair from AM requesting get in bed @ Tx. There-x in chair well tolerated with high levels of engagement. B/L knee ROM limited to 90/95* S/P prior TKA.   AROM 0* off seat pan LT hip in flex with AA 65*. MMT LT hip 3-/5, knee 4/5. Transfer to stand was min x 2 assist w/o dizziness. Continues with decreased closed chain standing tolerances 2/2 pain and general weakness/fatigue. Amb from chair to bed only 2/2 continued inability to tolerate standing and weakness/pain. Amb with [employer] with x 2 hands on Min assist with high difficulty wt shifting to advance LE. Micro steps to bed then lateral to [facility] for total < 10'. WNL sitting balances. Transfer to supine position was max/mod x 2. Positioned centrally in bed also requiring max/mod x 2 assist.  Call light and wall phone on bed. NC02 in line. B/L PCD's placed by RN with heel protectors placed. Very appreciative for care today. Comments 9/3/22 AM:  in bed on arrival receptive for getting OOB into chair. Pain under control reported. Bed mobility to EOB was mod assisted 2/2 pain, weakness and just recently return from PACU. Reported dizziness from the sup-sit which is not her baseline; sat good 5' to allow recovery. She states was mild dizziness. Transfer to stand was Min x 2 assisted w/o a staggered transfer. No dizziness reported with the sit-stand transfer. Amb with [employer] and assist of 2 Min to bedside chair recliner. Inc pain and fatigue related to transfer with a inc hands on to help control task. Difficulty with WT shift LT to advance RT foot. Only able to tolerate the 5'. Inst on recliner chair usage. There-ex in chair well tolerated; AA hip flex, LAQ, heel raises x 20 ea. B/L knee flex post TKA LT 90* and RT 95*. LT hip flex AA/P 60*. Footrest with pillow topper for comfort. Call light and wall phone on bed to LT and easily reachable. Tray to RT side and height adjusted for chair sitting. Very appreciative for care. Treatment:  Patient practiced and was instructed in the following treatment:    Bed mob Transfers  There-x  Transfers  Education    Pt's/ family goals   1.  Not addressed    Patient and or family understand(s) diagnosis, prognosis, and plan of care. PLAN:    PT care will be provided in accordance with the objectives noted above. Exercises and functional mobility practice will be used as well as appropriate assistive devices or modalities to obtain goals. Patient and family education will also be administered as needed. Frequency of treatments: Daily BID    Total Treatment Time  25 minutes     Evaluation Time includes thorough review of current medical information, gathering information on past medical history/social history and prior level of function, completion of standardized testing/informal observation of tasks, assessment of data and education on plan of care and goals.     CPT codes:  [] Low Complexity PT evaluation 17328  [] Moderate Complexity PT evaluation 74133  [] High Complexity PT evaluation 99267  [] PT Re-evaluation 51290  [x] Gait training 03586 ** minutes  [] Manual therapy 87077 ** minutes  [x] Therapeutic activities 18469 ** minutes  [x] Therapeutic exercises 80438 ** minutes  [] Neuromuscular reeducation 32979 ** minutes    Jacquelyn Parker PT, RRT, CEAS

## 2022-09-04 LAB
ANION GAP SERPL CALCULATED.3IONS-SCNC: 8 MMOL/L (ref 7–16)
BUN BLDV-MCNC: 21 MG/DL (ref 6–23)
CALCIUM SERPL-MCNC: 8.3 MG/DL (ref 8.6–10.2)
CHLORIDE BLD-SCNC: 106 MMOL/L (ref 98–107)
CO2: 23 MMOL/L (ref 22–29)
CREAT SERPL-MCNC: 0.7 MG/DL (ref 0.5–1)
GFR AFRICAN AMERICAN: >60
GFR NON-AFRICAN AMERICAN: >60 ML/MIN/1.73
GLUCOSE BLD-MCNC: 125 MG/DL (ref 74–99)
METER GLUCOSE: 117 MG/DL (ref 74–99)
METER GLUCOSE: 126 MG/DL (ref 74–99)
METER GLUCOSE: 131 MG/DL (ref 74–99)
METER GLUCOSE: 140 MG/DL (ref 74–99)
MRSA CULTURE ONLY: NORMAL
POTASSIUM SERPL-SCNC: 3.7 MMOL/L (ref 3.5–5)
SODIUM BLD-SCNC: 137 MMOL/L (ref 132–146)

## 2022-09-04 PROCEDURE — 6370000000 HC RX 637 (ALT 250 FOR IP): Performed by: INTERNAL MEDICINE

## 2022-09-04 PROCEDURE — 6360000002 HC RX W HCPCS: Performed by: STUDENT IN AN ORGANIZED HEALTH CARE EDUCATION/TRAINING PROGRAM

## 2022-09-04 PROCEDURE — 97110 THERAPEUTIC EXERCISES: CPT

## 2022-09-04 PROCEDURE — 80048 BASIC METABOLIC PNL TOTAL CA: CPT

## 2022-09-04 PROCEDURE — 2580000003 HC RX 258: Performed by: STUDENT IN AN ORGANIZED HEALTH CARE EDUCATION/TRAINING PROGRAM

## 2022-09-04 PROCEDURE — 82962 GLUCOSE BLOOD TEST: CPT

## 2022-09-04 PROCEDURE — 36415 COLL VENOUS BLD VENIPUNCTURE: CPT

## 2022-09-04 PROCEDURE — 97116 GAIT TRAINING THERAPY: CPT

## 2022-09-04 PROCEDURE — 6370000000 HC RX 637 (ALT 250 FOR IP): Performed by: STUDENT IN AN ORGANIZED HEALTH CARE EDUCATION/TRAINING PROGRAM

## 2022-09-04 PROCEDURE — 1200000000 HC SEMI PRIVATE

## 2022-09-04 RX ORDER — HYDROCODONE BITARTRATE AND ACETAMINOPHEN 10; 325 MG/1; MG/1
1 TABLET ORAL EVERY 6 HOURS PRN
Status: DISCONTINUED | OUTPATIENT
Start: 2022-09-04 | End: 2022-09-07 | Stop reason: HOSPADM

## 2022-09-04 RX ORDER — HYDROCODONE BITARTRATE AND ACETAMINOPHEN 7.5; 325 MG/1; MG/1
1 TABLET ORAL EVERY 6 HOURS PRN
Status: DISCONTINUED | OUTPATIENT
Start: 2022-09-04 | End: 2022-09-04 | Stop reason: SDUPTHER

## 2022-09-04 RX ADMIN — METOPROLOL TARTRATE 50 MG: 50 TABLET, FILM COATED ORAL at 08:36

## 2022-09-04 RX ADMIN — HYDROCODONE BITARTRATE AND ACETAMINOPHEN 1 TABLET: 10; 325 TABLET ORAL at 21:08

## 2022-09-04 RX ADMIN — SODIUM CHLORIDE, PRESERVATIVE FREE 10 ML: 5 INJECTION INTRAVENOUS at 21:08

## 2022-09-04 RX ADMIN — WATER 2000 MG: 1 INJECTION INTRAMUSCULAR; INTRAVENOUS; SUBCUTANEOUS at 02:10

## 2022-09-04 RX ADMIN — ATORVASTATIN CALCIUM 20 MG: 20 TABLET, FILM COATED ORAL at 08:37

## 2022-09-04 RX ADMIN — FUROSEMIDE 20 MG: 20 TABLET ORAL at 08:37

## 2022-09-04 RX ADMIN — HYDROXYUREA 500 MG: 500 CAPSULE ORAL at 11:33

## 2022-09-04 RX ADMIN — HYDROCODONE BITARTRATE AND ACETAMINOPHEN 1 TABLET: 7.5; 325 TABLET ORAL at 08:36

## 2022-09-04 RX ADMIN — CITALOPRAM HYDROBROMIDE 20 MG: 20 TABLET ORAL at 08:37

## 2022-09-04 RX ADMIN — HYDROCODONE BITARTRATE AND ACETAMINOPHEN 1 TABLET: 10; 325 TABLET ORAL at 14:37

## 2022-09-04 RX ADMIN — WATER 2000 MG: 1 INJECTION INTRAMUSCULAR; INTRAVENOUS; SUBCUTANEOUS at 09:15

## 2022-09-04 RX ADMIN — SODIUM CHLORIDE, PRESERVATIVE FREE 10 ML: 5 INJECTION INTRAVENOUS at 08:42

## 2022-09-04 RX ADMIN — ASPIRIN 325 MG: 325 TABLET, COATED ORAL at 08:37

## 2022-09-04 RX ADMIN — SODIUM CHLORIDE, PRESERVATIVE FREE 10 ML: 5 INJECTION INTRAVENOUS at 21:09

## 2022-09-04 RX ADMIN — ZOLPIDEM TARTRATE 5 MG: 5 TABLET ORAL at 22:17

## 2022-09-04 RX ADMIN — ASPIRIN 325 MG: 325 TABLET, COATED ORAL at 21:08

## 2022-09-04 ASSESSMENT — PAIN DESCRIPTION - DESCRIPTORS
DESCRIPTORS: SORE
DESCRIPTORS: SORE;ACHING
DESCRIPTORS: ACHING;SORE

## 2022-09-04 ASSESSMENT — PAIN DESCRIPTION - ONSET: ONSET: ON-GOING

## 2022-09-04 ASSESSMENT — PAIN DESCRIPTION - LOCATION
LOCATION: HIP
LOCATION: HIP
LOCATION: HIP;LEG

## 2022-09-04 ASSESSMENT — PAIN DESCRIPTION - ORIENTATION
ORIENTATION: LEFT

## 2022-09-04 ASSESSMENT — PAIN - FUNCTIONAL ASSESSMENT
PAIN_FUNCTIONAL_ASSESSMENT: PREVENTS OR INTERFERES SOME ACTIVE ACTIVITIES AND ADLS

## 2022-09-04 ASSESSMENT — PAIN SCALES - GENERAL
PAINLEVEL_OUTOF10: 9
PAINLEVEL_OUTOF10: 6
PAINLEVEL_OUTOF10: 9

## 2022-09-04 ASSESSMENT — PAIN DESCRIPTION - FREQUENCY: FREQUENCY: CONTINUOUS

## 2022-09-04 ASSESSMENT — PAIN DESCRIPTION - PAIN TYPE: TYPE: ACUTE PAIN;SURGICAL PAIN

## 2022-09-04 NOTE — PROGRESS NOTES
No jugular venous distention. Trachea midline. Respiratory:  Normal respiratory effort. Clear to auscultation, bilaterally without Rales/Wheezes/Rhonchi. Cardiovascular:  Regular rate and rhythm   Abdomen: Soft, non-tender, non-distended with normal bowel sounds. COLOSTOMY INTACT, WITH BROWN STOOL  Musculoskeletal:  No clubbing, cyanosis or edema bilaterally. DSD INTACT LEFT HIP   Skin: Skin color, texture, turgor normal.  No rashes or lesions. Neurologic:  Neurovascularly intact   Psychiatric:  Alert and oriented,               Labs:   Recent Labs     09/02/22  1443 09/03/22  0130   WBC 8.1 9.2   HGB 11.5 10.8*   HCT 33.7* 31.5*    232     Recent Labs     09/02/22  1443 09/03/22  0130 09/04/22  0459    137 137   K 4.1 4.2 3.7    102 106   CO2 25 26 23   BUN 15 14 21   CREATININE 0.6 0.6 0.7   CALCIUM 9.0 8.8 8.3*     No results for input(s): AST, ALT, BILIDIR, BILITOT, ALKPHOS in the last 72 hours. Recent Labs     09/02/22  1443   INR 1.1     No results for input(s): [place] in the last 72 hours. No results for input(s): AST, ALT, ALB, BILIDIR, BILITOT, ALKPHOS in the last 72 hours. No results for input(s): LACTA in the last 72 hours. No results found for: Shasta Heck  No results found for: AMMONIA    Assessment:    Active Hospital Problems    Diagnosis Date Noted    Closed intertrochanteric fracture, left, initial encounter ([facility] [address] [place] 09/02/2022     Priority: Medium   CROHN'S DISEASE   H/O COLOSTOMY   HLD   HTN   II DM           PLAN:  PAIN MANAGEMENT   LOPRESSOR   HYDREA   LASIX   LIPITOR   SSI         DVT Prophylaxis:   SCD  Diet: ADULT DIET;  Regular  Code Status: FULL CODE     PT/OT Eval Status: ORDERED     Electronically signed by Katie Wagner DO on 9/4/2022 at 2:59 PM Linda Schwartz

## 2022-09-04 NOTE — PROGRESS NOTES
ORTHOPAEDIC SURGERY  Progress Note    CC: post op     Subjective:  Doing well. No issues overnight     Vitals  VITALS:  BP (!) 120/41   Pulse 68   Temp 98 °F (36.7 °C) (Oral)   Resp 16   Ht 5' 2\" (1.575 m)   Wt 138 lb (62.6 kg)   SpO2 96%   BMI 25.24 kg/m²   24HR INTAKE/OUTPUT:    Intake/Output Summary (Last 24 hours) at 9/4/2022 1351  Last data filed at 9/4/2022 0550  Gross per 24 hour   Intake --   Output 650 ml   Net -650 ml       PHYSICAL EXAM:    Orientation:  alert and oriented to person, place and time    Left Lower Extremity    Incision:  dressing in place, clean, dry and intact    Lower Extremity Motor :  Dorsiflexion:  5/5  Plantarflexion:  5/5  EHL:  5/5    Lower Extremity Sensory: intact L4-S1 distribution     Pulses: Foot warm/well perfused    Abnormal Exam findings:  none      LABS:    CBC:   Lab Results   Component Value Date/Time    WBC 9.2 09/03/2022 01:30 AM    RBC 2.64 09/03/2022 01:30 AM    HGB 10.8 09/03/2022 01:30 AM    HCT 31.5 09/03/2022 01:30 AM    .3 09/03/2022 01:30 AM    MCH 40.9 09/03/2022 01:30 AM    MCHC 34.3 09/03/2022 01:30 AM    RDW 13.5 09/03/2022 01:30 AM     09/03/2022 01:30 AM    MPV 9.7 09/03/2022 01:30 AM       ASSESSMENT AND PLAN:    Post operative day 1 status post left hip CMN for IT fracture  -Doing well, pain is controlled. She has been up ambulating with physical therapy and overall is feeling well  - Weight bearing as tolerated  - Deep venous thrombosis prophylaxis   - Continue physical therapy  - Pain Control:  Wean to oral meds   -  Aquacel protocol for dressing  -  D/C Plan:  Home Health, stable for DC home from ortho standpoint.   Follow up in my office 10-14 days for post op check    Marissa Dallas MD  Orthopaedic Surgery   9/4/22  1:51 PM

## 2022-09-04 NOTE — PROGRESS NOTES
Daily Treat        Attending Provider:  Kathya Johnson DO    Evaluating PT:  Jacquelyn Parker PT, RRT, CEAS    Room #:  6630/8967-Z  Diagnosis:  S/P fall with Left hip intertrochanteric fracture  Pertinent PMHx/PSHx:  B/L TKA, RT MACKENZIE  Procedure/Surgery:  Left hip reduction and fixation with cephalomedullary nail 9/3/22  Precautions:  WBAT, falls,   Equipment Needs:  [employer]    SUBJECTIVE:    Pt lives with alone in a 1 story home with 0 stairs and 0 rail to enter. Pt ambulated with SC when outside home PTA. OBJECTIVE:    Treatment  Date: 9/3/22 PM Treatment  9/4/22 PM Short Term/ Long Term   Goals   Was pt agreeable to Eval/treatment? Yes Yes    Does pt have pain? LT Hip LT hip slight imp pain     Bed Mobility  Rolling: NA  Supine to sit:    Sit to supine: Mod/max x 2  Scooting: Mod  Roll: NA   Sit-supine: Min A       Roll: S/Indep   Sup-sit: Min/CGA   Sit-sup: Min   Scoot: Indep   Transfers Sit to stand: Min x 2  Stand to sit: Min x 2  Stand pivot: NA Recliner chair-stand: Min   Stand-bed: S/SBA CGA/SBA x 1    Ambulation   5 feet with [employer] Min x 2 chair-bed  20' [employer] SBA/S. Pain advanced since AM preventing 40' or > walk PM session  feet with [employer] CGA/SBA   Stair negotiation: ascended and descended  NA  NA     ROM See comments below Decreased throughout LT hip, knee flex 90/95*, Knee ext WNL    MMT LT hip 3-/5, knee 4/5 LT hip 3-/5, knee 4/5 Imp LTLE . 5 to 1 grade globally   AM-PAC 6 Clicks 13/78  16/40      Pt is A & O x 3   Sensation:  Pt denies numbness and tingling to LT LE  Edema:  Post operative LTLE  Balance: sitting:Indep and standing: requiring SBA/S x 1  Endurance: Functional, no baseline issues, no NC02 home use    There-ex: LT hip flex AA, LAQ, AP, heel raises, x 30 ea and gluteal set x 10       Patient education  Pt educated on WBAT tolerances, bed mobility tech, transfer hand place, Imp of ROM while in chair     Patient response to education:   Pt verbalized understanding Pt demonstrated skill Pt non-guarded. No NC02 in use. Call light and wall phone on bed to LT and easily reachable. Tray to RT side and height adjusted for sitting position. Very appreciative for care. Comments 9/3/22 PM: Remained in chair from AM requesting get in bed @ Tx. There-x in chair well tolerated with high levels of engagement. B/L knee ROM limited to 90/95* S/P prior TKA. AROM 0* off seat pan LT hip in flex with AA 65*. MMT LT hip 3-/5, knee 4/5. Transfer to stand was min x 2 assist w/o dizziness. Continues with decreased closed chain standing tolerances 2/2 pain and general weakness/fatigue. Amb from chair to bed only 2/2 continued inability to tolerate standing and weakness/pain. Amb with [address] with x 2 hands on Min assist with high difficulty wt shifting to advance LE. Micro steps to bed then lateral to [facility] for total < 10'. WNL sitting balances. Transfer to supine position was max/mod x 2. Positioned centrally in bed also requiring max/mod x 2 assist.  Call light and wall phone on bed. NC02 in line. B/L PCD's placed by RN with heel protectors placed. Very appreciative for care today. Comments 9/3/22 AM:  in bed on arrival receptive for getting OOB into chair. Pain under control reported. Bed mobility to EOB was mod assisted 2/2 pain, weakness and just recently return from PACU. Reported dizziness from the sup-sit which is not her baseline; sat good 5' to allow recovery. She states was mild dizziness. Transfer to stand was Min x 2 assisted w/o a staggered transfer. No dizziness reported with the sit-stand transfer. Amb with [address] and assist of 2 Min to bedside chair recliner. Inc pain and fatigue related to transfer with a inc hands on to help control task. Difficulty with WT shift LT to advance RT foot. Only able to tolerate the 5'. Inst on recliner chair usage. There-ex in chair well tolerated; AA hip flex, LAQ, heel raises x 20 ea. B/L knee flex post TKA LT 90* and RT 95*.   LT hip flex AA/P 60*.  Footrest with pillow topper for comfort. Call light and wall phone on bed to LT and easily reachable. Tray to RT side and height adjusted for chair sitting. Very appreciative for care. Treatment:  Patient practiced and was instructed in the following treatment:    Bed mob    There-x  Amb  Transfers  Education    Pt's/ family goals   1. Not addressed    Patient and or family understand(s) diagnosis, prognosis, and plan of care. PLAN:    PT care will be provided in accordance with the objectives noted above. Exercises and functional mobility practice will be used as well as appropriate assistive devices or modalities to obtain goals. Patient and family education will also be administered as needed. Frequency of treatments: Daily BID    Total Treatment Time  20 minutes     Evaluation Time includes thorough review of current medical information, gathering information on past medical history/social history and prior level of function, completion of standardized testing/informal observation of tasks, assessment of data and education on plan of care and goals.     CPT codes:  [] Low Complexity PT evaluation 84433  [] Moderate Complexity PT evaluation 75768  [] High Complexity PT evaluation 61213  [] PT Re-evaluation 52526  [x] Gait training 62997 ** minutes  [] Manual therapy 78587 ** minutes  [] Therapeutic activities 60558 ** minutes  [x] Therapeutic exercises 27475 ** minutes  [] Neuromuscular reeducation 95458 ** minutes    Law Mark PT, RRT, CEAS

## 2022-09-04 NOTE — PROGRESS NOTES
Daily Treat        Attending Provider:  Zhanna Carballo DO    Evaluating PT:  Dk Gill PT, RRT, CEAS    Room #:  4663/6559-T  Diagnosis:  S/P fall with Left hip intertrochanteric fracture  Pertinent PMHx/PSHx:  B/L TKA, RT AMCKENZIE  Procedure/Surgery:  Left hip reduction and fixation with cephalomedullary nail 9/3/22  Precautions:  WBAT, falls,   Equipment Needs:  [employer]    SUBJECTIVE:    Pt lives with alone in a 1 story home with 0 stairs and 0 rail to enter. Pt ambulated with SC when outside home PTA. OBJECTIVE:    Treatment  Date: 9/3/22 PM Treatment  9/4/22 AM Short Term/ Long Term   Goals   Was pt agreeable to Eval/treatment? Yes Yes    Does pt have pain? LT Hip LT hip slight imp pain     Bed Mobility  Rolling: NA  Supine to sit:    Sit to supine: Mod/max x 2  Scooting: Mod  Roll: NA   Sup-sit: Min A x 1   Scoot EOB: Min A   Roll: S/Indep   Sup-sit: Min/CGA   Sit-sup: Min   Scoot: Indep   Transfers Sit to stand: Min x 2  Stand to sit: Min x 2  Stand pivot: NA Bed-stand: CGA/Min x 2-1  Stand-chair: S/SBA CGA/SBA x 1    Ambulation   5 feet with [employer] Min x 2 chair-bed  40' [employer] SBA/S  feet with [employer] CGA/SBA   Stair negotiation: ascended and descended  NA  NA     ROM See comments below Decreased throughout LT hip, knee flex 90/95*, Knee ext WNL    MMT LT hip 3-/5, knee 4/5 LT hip 3-/5, knee 4/5 Imp LTLE . 5 to 1 grade globally   AM-PAC 6 Clicks 09/67  15/56      Pt is A & O x 3   Sensation:  Pt denies numbness and tingling to LT LE  Edema:  Post operative LTLE  Balance: sitting:Indep and standing: requiring SBA/S x 1  Endurance: Functional, no baseline issues, no NC02 home use    There-ex: LT hip flex AA, LAQ, AP, heel raises, x 30 ea and gluteal set x 10       Patient education  Pt educated on WBAT tolerances, bed mobility tech, transfer hand place, Imp of ROM while in chair     Patient response to education:   Pt verbalized understanding Pt demonstrated skill Pt requires further education in this area   Y y Review PRN     ASSESSMENT:    Comments 9/4/22 AM: In bed on arrival stating zero sleep as has baseline sleeping issued requiring medication; took the sleep meds last night but too early which affected her ability to fall asleep. Receptive and willing to get OOB in chair. Pain improving since prior day but still moderate. Transfer to EOB was much improved from prior day. No dizziness expressed during and post all positional changes. Transfer to stand was also much improved with imp load tolerances. No staggered or delayed transfer to stand. Amb with [employer] with a step to gait cycle antalgic and self-limited 40'; much imp from prior day. Indep with [employer] guide with no drifting, lateral veering or LOB. Good safety awareness and needing no cues for safety. Discussed imp of moving LE throughout day. AROM LT hip in flex 0* off seat pan 2/2 MMT with AA/P @ 80*+. Knee flex limited 90/95* 2/2 prior LT TKA. MMT LT hip 3-/5, knee 4-5/5, ankle 5/5. There-ex was well tolerated and non-guarded. No NC02 in use. Call light and wall phone on bed to LT and easily reachable. Tray to RT side and height adjusted for sitting position. Very appreciative for care. Comments 9/3/22 PM: Remained in chair from AM requesting get in bed @ Tx. There-x in chair well tolerated with high levels of engagement. B/L knee ROM limited to 90/95* S/P prior TKA. AROM 0* off seat pan LT hip in flex with AA 65*. MMT LT hip 3-/5, knee 4/5. Transfer to stand was min x 2 assist w/o dizziness. Continues with decreased closed chain standing tolerances 2/2 pain and general weakness/fatigue. Amb from chair to bed only 2/2 continued inability to tolerate standing and weakness/pain. Amb with [employer] with x 2 hands on Min assist with high difficulty wt shifting to advance LE. Micro steps to bed then lateral to [facility] for total < 10'. WNL sitting balances. Transfer to supine position was max/mod x 2.  Positioned centrally in bed also requiring max/mod x function, completion of standardized testing/informal observation of tasks, assessment of data and education on plan of care and goals.     CPT codes:  [] Low Complexity PT evaluation 87668  [] Moderate Complexity PT evaluation 67399  [] High Complexity PT evaluation 63217  [] PT Re-evaluation 04460  [x] Gait training 94168 ** minutes  [] Manual therapy 82927 ** minutes  [x] Therapeutic activities 43106 ** minutes  [x] Therapeutic exercises 59937 ** minutes  [] Neuromuscular reeducation 58256 ** minutes    Guilherme Cheng PT, RRT, CEAS

## 2022-09-05 LAB
ANION GAP SERPL CALCULATED.3IONS-SCNC: 7 MMOL/L (ref 7–16)
BUN BLDV-MCNC: 22 MG/DL (ref 6–23)
CALCIUM SERPL-MCNC: 8.5 MG/DL (ref 8.6–10.2)
CHLORIDE BLD-SCNC: 106 MMOL/L (ref 98–107)
CO2: 26 MMOL/L (ref 22–29)
CREAT SERPL-MCNC: 0.6 MG/DL (ref 0.5–1)
GFR AFRICAN AMERICAN: >60
GFR NON-AFRICAN AMERICAN: >60 ML/MIN/1.73
GLUCOSE BLD-MCNC: 92 MG/DL (ref 74–99)
METER GLUCOSE: 121 MG/DL (ref 74–99)
METER GLUCOSE: 130 MG/DL (ref 74–99)
METER GLUCOSE: 140 MG/DL (ref 74–99)
METER GLUCOSE: 96 MG/DL (ref 74–99)
POTASSIUM SERPL-SCNC: 3.7 MMOL/L (ref 3.5–5)
SODIUM BLD-SCNC: 139 MMOL/L (ref 132–146)

## 2022-09-05 PROCEDURE — 6370000000 HC RX 637 (ALT 250 FOR IP): Performed by: STUDENT IN AN ORGANIZED HEALTH CARE EDUCATION/TRAINING PROGRAM

## 2022-09-05 PROCEDURE — 6360000002 HC RX W HCPCS: Performed by: STUDENT IN AN ORGANIZED HEALTH CARE EDUCATION/TRAINING PROGRAM

## 2022-09-05 PROCEDURE — 2580000003 HC RX 258: Performed by: STUDENT IN AN ORGANIZED HEALTH CARE EDUCATION/TRAINING PROGRAM

## 2022-09-05 PROCEDURE — 97165 OT EVAL LOW COMPLEX 30 MIN: CPT

## 2022-09-05 PROCEDURE — 1200000000 HC SEMI PRIVATE

## 2022-09-05 PROCEDURE — 80048 BASIC METABOLIC PNL TOTAL CA: CPT

## 2022-09-05 PROCEDURE — 97530 THERAPEUTIC ACTIVITIES: CPT

## 2022-09-05 PROCEDURE — 6370000000 HC RX 637 (ALT 250 FOR IP): Performed by: INTERNAL MEDICINE

## 2022-09-05 PROCEDURE — 36415 COLL VENOUS BLD VENIPUNCTURE: CPT

## 2022-09-05 PROCEDURE — 82962 GLUCOSE BLOOD TEST: CPT

## 2022-09-05 RX ADMIN — HYDROXYUREA 500 MG: 500 CAPSULE ORAL at 08:38

## 2022-09-05 RX ADMIN — HYDROCODONE BITARTRATE AND ACETAMINOPHEN 1 TABLET: 10; 325 TABLET ORAL at 07:00

## 2022-09-05 RX ADMIN — METOPROLOL TARTRATE 50 MG: 50 TABLET, FILM COATED ORAL at 08:44

## 2022-09-05 RX ADMIN — FUROSEMIDE 20 MG: 20 TABLET ORAL at 08:44

## 2022-09-05 RX ADMIN — CITALOPRAM HYDROBROMIDE 20 MG: 20 TABLET ORAL at 08:38

## 2022-09-05 RX ADMIN — MORPHINE SULFATE 2 MG: 2 INJECTION, SOLUTION INTRAMUSCULAR; INTRAVENOUS at 22:26

## 2022-09-05 RX ADMIN — SODIUM CHLORIDE, PRESERVATIVE FREE 10 ML: 5 INJECTION INTRAVENOUS at 08:45

## 2022-09-05 RX ADMIN — ASPIRIN 325 MG: 325 TABLET, COATED ORAL at 08:38

## 2022-09-05 RX ADMIN — ATORVASTATIN CALCIUM 20 MG: 20 TABLET, FILM COATED ORAL at 08:38

## 2022-09-05 RX ADMIN — HYDROCODONE BITARTRATE AND ACETAMINOPHEN 1 TABLET: 10; 325 TABLET ORAL at 19:34

## 2022-09-05 RX ADMIN — HYDROCODONE BITARTRATE AND ACETAMINOPHEN 1 TABLET: 10; 325 TABLET ORAL at 13:04

## 2022-09-05 RX ADMIN — MORPHINE SULFATE 2 MG: 2 INJECTION, SOLUTION INTRAMUSCULAR; INTRAVENOUS at 10:37

## 2022-09-05 ASSESSMENT — PAIN SCALES - GENERAL
PAINLEVEL_OUTOF10: 8
PAINLEVEL_OUTOF10: 0
PAINLEVEL_OUTOF10: 10
PAINLEVEL_OUTOF10: 9
PAINLEVEL_OUTOF10: 9
PAINLEVEL_OUTOF10: 10

## 2022-09-05 ASSESSMENT — PAIN DESCRIPTION - ORIENTATION
ORIENTATION: LEFT

## 2022-09-05 ASSESSMENT — PAIN - FUNCTIONAL ASSESSMENT
PAIN_FUNCTIONAL_ASSESSMENT: PREVENTS OR INTERFERES SOME ACTIVE ACTIVITIES AND ADLS

## 2022-09-05 ASSESSMENT — PAIN DESCRIPTION - PAIN TYPE: TYPE: SURGICAL PAIN

## 2022-09-05 ASSESSMENT — PAIN DESCRIPTION - DESCRIPTORS
DESCRIPTORS: SORE;CRAMPING
DESCRIPTORS: ACHING;SORE
DESCRIPTORS: SORE;CRAMPING
DESCRIPTORS: ACHING;SORE
DESCRIPTORS: SHARP;SORE

## 2022-09-05 ASSESSMENT — PAIN DESCRIPTION - LOCATION
LOCATION: LEG
LOCATION: HIP
LOCATION: LEG
LOCATION: LEG;HIP
LOCATION: LEG

## 2022-09-05 ASSESSMENT — PAIN DESCRIPTION - ONSET: ONSET: ON-GOING

## 2022-09-05 ASSESSMENT — PAIN DESCRIPTION - FREQUENCY: FREQUENCY: CONTINUOUS

## 2022-09-05 NOTE — PLAN OF CARE
Problem: ABCDS Injury Assessment  Goal: Absence of physical injury  Outcome: Progressing     Problem: Pain  Goal: Verbalizes/displays adequate comfort level or baseline comfort level  Outcome: Progressing     Problem: Discharge Planning  Goal: Discharge to home or other facility with appropriate resources  Outcome: Progressing     Problem: Skin/Tissue Integrity  Goal: Absence of new skin breakdown  Description: 1. Monitor for areas of redness and/or skin breakdown  2. Assess vascular access sites hourly  3. Every 4-6 hours minimum:  Change oxygen saturation probe site  4. Every 4-6 hours:  If on nasal continuous positive airway pressure, respiratory therapy assess nares and determine need for appliance change or resting period.   Outcome: Progressing     Problem: Safety - Adult  Goal: Free from fall injury  Outcome: Progressing     Problem: Chronic Conditions and Co-morbidities  Goal: Patient's chronic conditions and co-morbidity symptoms are monitored and maintained or improved  Outcome: Progressing

## 2022-09-05 NOTE — PLAN OF CARE
Problem: ABCDS Injury Assessment  Goal: Absence of physical injury  9/5/2022 1314 by Storm Zambrano RN  Outcome: Progressing  9/5/2022 1313 by Storm Zambrano RN  Outcome: Progressing     Problem: Pain  Goal: Verbalizes/displays adequate comfort level or baseline comfort level  9/5/2022 1314 by Storm Zambrano RN  Outcome: Progressing  9/5/2022 1313 by Storm Zambrano RN  Outcome: Progressing     Problem: Discharge Planning  Goal: Discharge to home or other facility with appropriate resources  9/5/2022 1314 by Storm Zambrano RN  Outcome: Progressing  9/5/2022 1313 by Storm Zambrano RN  Outcome: Progressing     Problem: Skin/Tissue Integrity  Goal: Absence of new skin breakdown  Description: 1. Monitor for areas of redness and/or skin breakdown  2. Assess vascular access sites hourly  3. Every 4-6 hours minimum:  Change oxygen saturation probe site  4. Every 4-6 hours:  If on nasal continuous positive airway pressure, respiratory therapy assess nares and determine need for appliance change or resting period.   9/5/2022 1314 by Storm Zambrano RN  Outcome: Progressing  9/5/2022 1313 by Storm Zambrano RN  Outcome: Progressing     Problem: Safety - Adult  Goal: Free from fall injury  9/5/2022 1314 by Storm Zabmrano RN  Outcome: Progressing  9/5/2022 1313 by Storm Zambrano RN  Outcome: Progressing     Problem: Chronic Conditions and Co-morbidities  Goal: Patient's chronic conditions and co-morbidity symptoms are monitored and maintained or improved  9/5/2022 1314 by Storm Zambrano RN  Outcome: Progressing  9/5/2022 1313 by Storm Zambrano RN  Outcome: Progressing

## 2022-09-05 NOTE — PROGRESS NOTES
[facility] Quality Flow/Interdisciplinary Rounds Progress Note        Quality Flow Rounds held on September 5, 2022    Disciplines Attending:  Bedside Nurse, , , and Nursing Unit Leadership    Jenny Casas was admitted on 9/2/2022 11:37 AM    Anticipated Discharge Date:       Disposition:    Phoenix Score:  Phoenix Scale Score: 19    Readmission Risk              Risk of Unplanned Readmission:  14           Discussed patient goal for the day, patient clinical progression, and barriers to discharge.   The following Goal(s) of the Day/Commitment(s) have been identified:   Discharge [address] Joce Aguayo RN  September 5, 2022

## 2022-09-05 NOTE — PROGRESS NOTES
OCCUPATIONAL THERAPY INITIAL EVALUATION     [facility]                                                  Patient Name: Rosana Bruce    MRN: [id number]    : 1945    Room: [address] [place] [address]      Evaluating OT: JAILYN Schaffer [id number]      Referring Provider:Valentin Juárez DO    Specific Provider Orders/Date: 2022 OT eval and treat       Diagnosis: Fall with Left hip intertrochanteric fracture    Surgery:  9/3/2022 Left hip reduction and fixation with cephalomedullary nail    Pertinent Medical History:  R and L shoulder replacements, Bilateral TKA, R MACKENZIE, ostomy bag      Past Medical History:   Diagnosis Date    Arthritis     Crohn disease ([address]     Diabetes mellitus ([address]     Hyperlipidemia     Hypertension     Insomnia     Myelodysplastic disease ([address]     Ulcerative colitis with intestinal obstruction ([address]     Unspecified diseases of blood and blood-forming organs          Past Surgical History:   Procedure Laterality Date    ADENOIDECTOMY      CARPAL TUNNEL RELEASE Right     CERVICAL FUSION      COLON SURGERY      COLOSTOMY      EYE SURGERY Bilateral     CATARACT    HERNIA REPAIR      JOINT REPLACEMENT      rt and lt    JOINT REPLACEMENT Bilateral     JOINT REPLACEMENT Right     TONSILLECTOMY      TUBAL LIGATION        Precautions:  Fall Risk, WBAT LLE      Assessment of current deficits    [] Functional mobility  [x]ADLs  [x] Strength               []Cognition    [x] Functional transfers   [x] IADLs         [x] Safety Awareness   [x]Endurance    [] Fine Coordination              [x] Balance      [] Vision/perception   []Sensation     []Gross Motor Coordination  [] ROM  [] Delirium                   [] Motor Control     OT PLAN OF CARE   OT POC based on physician orders, patient diagnosis and results of clinical assessment    Frequency/Duration 2-5 days/wk for 2 weeks PRN   Specific OT Treatment Interventions to include:   * Instruction/training on adapted ADL techniques and AE recommendations to increase functional independence within precautions       * Training on energy conservation strategies, correct breathing pattern and techniques to improve independence/tolerance for self-care routine  * Functional transfer/mobility training/DME recommendations for increased independence, safety, and fall prevention  * Patient/Family education to increase follow through with safety techniques and functional independence  * Therapeutic exercise to improve motor endurance, ROM, and functional strength for ADLs/functional transfers  * Therapeutic activities to facilitate/challenge dynamic balance, stand tolerance for increased safety and independence with ADLs  * Therapeutic activities to facilitate gross/fine motor skills for increased independence with ADLs    Recommended Adaptive Equipment: TBD     Home Living: Pt lives alone  in a 1 story home with 1 step over threshold to enter with 0 rails   Bathroom setup: hi- rise commodes , tub/ shower unit. Pt typically stands in water filled tub to clean self due to ostomy bag.    Equipment owned: single point cane-     Prior Level of Function: I with use of AE with ADLs , family assist  with IADLs; ambulated with SPC   Pt reports frequent falls at home  Driving: yes  Occupation:no     Pain Level: 0/10 at rest, 9/10 following therapy- nursing notified ;  Cognition: A&O: 3/3; Follows 1-2 step directions      Functional Assessment:  AM-PAC Daily Activity Raw Score: 16/24   Initial Eval Status  Date: 9/5/2022 Treatment Status  Date: STGs = LTGs  Time frame: 10-14 days   Feeding Independent      Grooming Minimal Assist   Independent    UB Dressing Minimal Assist gown management  Moderate [place]    LB Dressing Maximal Assist   Independent   Bathing Moderate Assist  Minimal assist    Toileting Minimal Assist to bed side commode  Independent    Bed Mobility  Supine to sit: Maximal Assist   Sit to supine: NT    Supine to sit: Independent   Sit to supine: Independent    Functional Transfers Minimal Assist   Sit to stand from EOB with bed slightly elevated   Independent    Functional Mobility Min/ Mod  Assist with FWW EOB to bedside chair   Contact Guard Assist    Balance Sitting:     Static:  good    Dynamic:good-  Standing: Min/ Mod A      Activity Tolerance Fair-   Good- to assist with self care tasks   Visual/  Perceptual Glasses: yes                Hand Dominance R   AROM (PROM) Strength Additional Info:    RUE  Shld flex 0- <90*  4-/5 distal good  and wfl FMC/dexterity noted during ADL tasks       LUE Shld flex 0- <90*  4-/5 good  and wfl FMC/dexterity noted during ADL tasks       Hearing: WFL   Sensation:  No c/o numbness or tingling   Tone: WFL   Edema: none noted     Comments: Upon arrival patient supine in bed and agreeable to therapy. Pt participated in OOB activity, but reported to be stiffer this date and had a harder time walking. At end of session, patient sitting in bedside chair with nursing present to address pain level with call light and phone within reach, all lines and tubes intact. Overall patient demonstrated  decreased independence and safety during completion of ADL/functional transfer/mobility tasks. Pt would benefit from continued skilled OT to increase safety and independence with completion of ADL/IADL tasks for functional independence and quality of life. Treatment: OT treatment provided this date includes:   Instruction/training on safety and adapted techniques for completion of ADLs: Instruction/training on safe functional mobility/transfer techniques: Instruction/training on energy conservation/work simplification for completion of ADLs:.        Rehab Potential: Good  for established goals     Patient / Family Goal: to return home       Patient and/or family were instructed on functional diagnosis, prognosis/goals and OT plan of care. Demonstrated good understanding. Eval Complexity: Low    Time In: 10:10  Time Out: 10:35  Total Treatment Time: 15    Min Units   OT Eval Low 71945    1   OT Eval Medium 39971      OT Eval High 87172      OT Re-Eval [id number]       Therapeutic Ex 52799       Therapeutic Activities 04071  15  1   ADL/Self Care 71004       Orthotic Management 28701       Manual 26852     Neuro Re-Ed 14966       Non-Billable Time          Evaluation Time additionally includes thorough review of current medical information, gathering information on past medical history/social history and prior level of function, interpretation of standardized testing/informal observation of tasks, assessment of data and development of plan of care and goals.           Hortensia Hoover, OTR/L [id number]

## 2022-09-05 NOTE — PROGRESS NOTES
Hospitalist Progress Note      PCP: Luis Alberto Medina DO    Date of Admission: 9/2/2022        Hospital Course:  76 y.o. female presented with  FALL, SUSTAINED A FRACTURE OF HER LEFT HIP. WENT FOR OR. STATES SHE WAS REACHING FOR SOMETHING AND FELL HIT HER HEAD ON THE FLOOR, DENIES LOC. Adina Serra ON SEPT 3, 2022. Frank Carr SAYS SHE CAN DC HOME, SHE WANTS A LAURYN. Subjective:  HAVING POST OP PAIN          Medications:  Reviewed    Infusion Medications    sodium chloride      dextrose      sodium chloride       Scheduled Medications    sodium chloride flush  5-40 mL IntraVENous 2 times per day    aspirin  325 mg Oral BID    insulin lispro  0-10 Units SubCUTAneous 4x Daily AC & HS    citalopram  20 mg Oral Daily    furosemide  20 mg Oral Daily    hydroxyurea  500 mg Oral Daily    metoprolol tartrate  50 mg Oral Daily    atorvastatin  20 mg Oral Daily    zolpidem  5 mg Oral Nightly    sodium chloride flush  5-40 mL IntraVENous 2 times per day     PRN Meds: HYDROcodone-acetaminophen, sodium chloride flush, sodium chloride, glucose, dextrose bolus **OR** dextrose bolus, glucagon (rDNA), dextrose, sodium chloride flush, sodium chloride, ondansetron **OR** ondansetron, polyethylene glycol, acetaminophen **OR** acetaminophen, morphine, potassium chloride **OR** potassium alternative oral replacement **OR** potassium chloride, albuterol      Intake/Output Summary (Last 24 hours) at 9/5/2022 1404  Last data filed at 9/4/2022 2020  Gross per 24 hour   Intake --   Output 200 ml   Net -200 ml       Exam:    BP (!) 94/50   Pulse 66   Temp 98.8 °F (37.1 °C) (Oral)   Resp 16   Ht 5' 2\" (1.575 m)   Wt 138 lb (62.6 kg)   SpO2 98%   BMI 25.24 kg/m²       General appearance:  No apparent distress,   HEENT:  Normal cephalic, Conjunctivae/corneas clear. HEMATOMA RIGHT TEMPORAL AREA, ECCHYMTOIC  Neck: Supple, with full range of motion. No jugular venous distention. Trachea midline. Respiratory:  Normal respiratory effort. Clear to auscultation, bilaterally without Rales/Wheezes/Rhonchi. Cardiovascular:  Regular rate and rhythm   Abdomen: Soft, non-tender, non-distended with normal bowel sounds. COLOSTOMY INTACT, WITH BROWN STOOL  Musculoskeletal:  No clubbing, cyanosis or edema bilaterally. DSD INTACT LEFT HIP   Skin: Skin color, texture, turgor normal.  No rashes or lesions. Neurologic:  Neurovascularly intact   Psychiatric:  Alert and oriented,                 Labs:   Recent Labs     09/02/22  1443 09/03/22  0130   WBC 8.1 9.2   HGB 11.5 10.8*   HCT 33.7* 31.5*    232     Recent Labs     09/03/22  0130 09/04/22  0459 09/05/22  0656    137 139   K 4.2 3.7 3.7    106 106   CO2 26 23 26   BUN 14 21 22   CREATININE 0.6 0.7 0.6   CALCIUM 8.8 8.3* 8.5*     No results for input(s): AST, ALT, BILIDIR, BILITOT, ALKPHOS in the last 72 hours. Recent Labs     09/02/22  1443   INR 1.1     No results for input(s): Hyun Horn in the last 72 hours. No results for input(s): AST, ALT, ALB, BILIDIR, BILITOT, ALKPHOS in the last 72 hours. No results for input(s): LACTA in the last 72 hours. No results found for: Dipak Haddad  No results found for: AMMONIA    Assessment:    Active Hospital Problems    Diagnosis Date Noted    Closed intertrochanteric fracture, left, initial encounter ([facility] Alycia López 09/02/2022     Priority: Medium   CROHN'S DISEASE   H/O COLOSTOMY   HLD   HTN   II DM           PLAN:  PAIN MANAGEMENT   LOPRESSOR   HYDREA   LASIX   LIPITOR   SSI         DVT Prophylaxis:   SCD  Diet: ADULT DIET;  Regular  Code Status: FULL CODE     PT/OT Eval Status: ORDERED  LAURYN RAMIRES    Electronically signed by Cinthya Hernandez DO on 9/5/2022 at 2:04 PM [facility]

## 2022-09-06 VITALS
SYSTOLIC BLOOD PRESSURE: 137 MMHG | HEIGHT: 62 IN | DIASTOLIC BLOOD PRESSURE: 60 MMHG | RESPIRATION RATE: 16 BRPM | OXYGEN SATURATION: 97 % | HEART RATE: 62 BPM | WEIGHT: 138 LBS | TEMPERATURE: 98.8 F | BODY MASS INDEX: 25.4 KG/M2

## 2022-09-06 LAB
ANION GAP SERPL CALCULATED.3IONS-SCNC: 10 MMOL/L (ref 7–16)
BUN BLDV-MCNC: 23 MG/DL (ref 6–23)
CALCIUM SERPL-MCNC: 8.5 MG/DL (ref 8.6–10.2)
CHLORIDE BLD-SCNC: 108 MMOL/L (ref 98–107)
CO2: 24 MMOL/L (ref 22–29)
CREAT SERPL-MCNC: 0.6 MG/DL (ref 0.5–1)
GFR AFRICAN AMERICAN: >60
GFR NON-AFRICAN AMERICAN: >60 ML/MIN/1.73
GLUCOSE BLD-MCNC: 103 MG/DL (ref 74–99)
METER GLUCOSE: 108 MG/DL (ref 74–99)
METER GLUCOSE: 141 MG/DL (ref 74–99)
METER GLUCOSE: 155 MG/DL (ref 74–99)
METER GLUCOSE: 180 MG/DL (ref 74–99)
POTASSIUM SERPL-SCNC: 3.4 MMOL/L (ref 3.5–5)
SARS-COV-2, NAAT: NOT DETECTED
SODIUM BLD-SCNC: 142 MMOL/L (ref 132–146)

## 2022-09-06 PROCEDURE — 6370000000 HC RX 637 (ALT 250 FOR IP): Performed by: STUDENT IN AN ORGANIZED HEALTH CARE EDUCATION/TRAINING PROGRAM

## 2022-09-06 PROCEDURE — 87635 SARS-COV-2 COVID-19 AMP PRB: CPT

## 2022-09-06 PROCEDURE — 2580000003 HC RX 258: Performed by: STUDENT IN AN ORGANIZED HEALTH CARE EDUCATION/TRAINING PROGRAM

## 2022-09-06 PROCEDURE — 97530 THERAPEUTIC ACTIVITIES: CPT

## 2022-09-06 PROCEDURE — 6370000000 HC RX 637 (ALT 250 FOR IP): Performed by: INTERNAL MEDICINE

## 2022-09-06 PROCEDURE — 97535 SELF CARE MNGMENT TRAINING: CPT

## 2022-09-06 PROCEDURE — 80048 BASIC METABOLIC PNL TOTAL CA: CPT

## 2022-09-06 PROCEDURE — 82962 GLUCOSE BLOOD TEST: CPT

## 2022-09-06 PROCEDURE — 97110 THERAPEUTIC EXERCISES: CPT

## 2022-09-06 PROCEDURE — 6360000002 HC RX W HCPCS: Performed by: STUDENT IN AN ORGANIZED HEALTH CARE EDUCATION/TRAINING PROGRAM

## 2022-09-06 PROCEDURE — 1200000000 HC SEMI PRIVATE

## 2022-09-06 PROCEDURE — 36415 COLL VENOUS BLD VENIPUNCTURE: CPT

## 2022-09-06 RX ORDER — POTASSIUM CHLORIDE 20 MEQ/1
40 TABLET, EXTENDED RELEASE ORAL ONCE
Status: COMPLETED | OUTPATIENT
Start: 2022-09-06 | End: 2022-09-06

## 2022-09-06 RX ORDER — INSULIN LISPRO 100 [IU]/ML
0-10 INJECTION, SOLUTION INTRAVENOUS; SUBCUTANEOUS
DISCHARGE
Start: 2022-09-06

## 2022-09-06 RX ADMIN — ZOLPIDEM TARTRATE 5 MG: 5 TABLET ORAL at 20:53

## 2022-09-06 RX ADMIN — POTASSIUM CHLORIDE 40 MEQ: 1500 TABLET, EXTENDED RELEASE ORAL at 17:46

## 2022-09-06 RX ADMIN — ASPIRIN 325 MG: 325 TABLET, COATED ORAL at 00:23

## 2022-09-06 RX ADMIN — MORPHINE SULFATE 2 MG: 2 INJECTION, SOLUTION INTRAMUSCULAR; INTRAVENOUS at 17:46

## 2022-09-06 RX ADMIN — HYDROXYUREA 500 MG: 500 CAPSULE ORAL at 09:33

## 2022-09-06 RX ADMIN — HYDROCODONE BITARTRATE AND ACETAMINOPHEN 1 TABLET: 10; 325 TABLET ORAL at 13:26

## 2022-09-06 RX ADMIN — CITALOPRAM HYDROBROMIDE 20 MG: 20 TABLET ORAL at 09:33

## 2022-09-06 RX ADMIN — INSULIN LISPRO 2 UNITS: 100 INJECTION, SOLUTION INTRAVENOUS; SUBCUTANEOUS at 20:53

## 2022-09-06 RX ADMIN — HYDROCODONE BITARTRATE AND ACETAMINOPHEN 1 TABLET: 10; 325 TABLET ORAL at 23:20

## 2022-09-06 RX ADMIN — METOPROLOL TARTRATE 50 MG: 50 TABLET, FILM COATED ORAL at 09:33

## 2022-09-06 RX ADMIN — ACETAMINOPHEN 650 MG: 325 TABLET ORAL at 09:35

## 2022-09-06 RX ADMIN — HYDROCODONE BITARTRATE AND ACETAMINOPHEN 1 TABLET: 10; 325 TABLET ORAL at 06:32

## 2022-09-06 RX ADMIN — SODIUM CHLORIDE, PRESERVATIVE FREE 20 ML: 5 INJECTION INTRAVENOUS at 09:33

## 2022-09-06 RX ADMIN — ZOLPIDEM TARTRATE 5 MG: 5 TABLET ORAL at 00:23

## 2022-09-06 RX ADMIN — SODIUM CHLORIDE, PRESERVATIVE FREE 10 ML: 5 INJECTION INTRAVENOUS at 09:32

## 2022-09-06 RX ADMIN — ASPIRIN 325 MG: 325 TABLET, COATED ORAL at 20:53

## 2022-09-06 RX ADMIN — FUROSEMIDE 20 MG: 20 TABLET ORAL at 09:33

## 2022-09-06 RX ADMIN — SODIUM CHLORIDE, PRESERVATIVE FREE 10 ML: 5 INJECTION INTRAVENOUS at 20:55

## 2022-09-06 RX ADMIN — ASPIRIN 325 MG: 325 TABLET, COATED ORAL at 09:33

## 2022-09-06 RX ADMIN — ATORVASTATIN CALCIUM 20 MG: 20 TABLET, FILM COATED ORAL at 09:33

## 2022-09-06 ASSESSMENT — PAIN DESCRIPTION - DESCRIPTORS
DESCRIPTORS: ACHING
DESCRIPTORS: SORE;DISCOMFORT;HEAVINESS
DESCRIPTORS: ACHING

## 2022-09-06 ASSESSMENT — PAIN DESCRIPTION - LOCATION
LOCATION: HIP
LOCATION: HIP
LOCATION: LEG

## 2022-09-06 ASSESSMENT — PAIN DESCRIPTION - ORIENTATION
ORIENTATION: LEFT

## 2022-09-06 ASSESSMENT — PAIN SCALES - GENERAL
PAINLEVEL_OUTOF10: 9

## 2022-09-06 ASSESSMENT — PAIN - FUNCTIONAL ASSESSMENT: PAIN_FUNCTIONAL_ASSESSMENT: PREVENTS OR INTERFERES SOME ACTIVE ACTIVITIES AND ADLS

## 2022-09-06 NOTE — CARE COORDINATION
Social Work:    Physician ambulance was arranged to transport Mrs. Marcos to [facility] at 9:30 p.m. Patient, her daughter Sophie Paulino at [facility] are all aware.     Electronically signed by ANDRÉS Wilson on 9/6/2022 at 3:43 PM

## 2022-09-06 NOTE — PROGRESS NOTES
Treatment Interventions to include:   * Instruction/training on adapted ADL techniques and AE recommendations to increase functional independence within precautions       * Training on energy conservation strategies, correct breathing pattern and techniques to improve independence/tolerance for self-care routine  * Functional transfer/mobility training/DME recommendations for increased independence, safety, and fall prevention  * Patient/Family education to increase follow through with safety techniques and functional independence  * Therapeutic exercise to improve motor endurance, ROM, and functional strength for ADLs/functional transfers  * Therapeutic activities to facilitate/challenge dynamic balance, stand tolerance for increased safety and independence with ADLs  * Therapeutic activities to facilitate gross/fine motor skills for increased independence with ADLs     Recommended Adaptive Equipment: TBD      Home Living: Pt lives alone  in a 1 story home with 1 step over threshold to enter with 0 rails   Bathroom setup: hi- rise commodes , tub/ shower unit. Pt typically stands in water filled tub to clean self due to ostomy bag. Equipment owned: single point cane-      Prior Level of Function: I with use of AE with ADLs , family assist  with IADLs; ambulated with SPC   Pt reports frequent falls at home  Driving: yes  Occupation:no      Pain Level: per pt reports 8-9/10   Cognition: Awake and alert. Cues for safety techniques. Functional Assessment:  AM-PAC Daily Activity Raw Score: 16/24    Initial Eval Status  Date: 9/5/2022 Treatment Status  Date: 9/6/22  STGs = LTGs  Time frame: 10-14 days   Feeding Independent        Grooming Minimal Assist  Setup seated to brush teeth. Assist to brush hair due to limited B shoulder flexion.      Independent    UB Dressing Minimal Assist gown management Min A   Moderate [place]    LB Dressing Maximal Assist   mod A   Assist to thread clothing over feet. Independent   Bathing Moderate Assist Min A UE  Mod A LB   Pt able to wash to knee level while seated. Minimal assist    Toileting Minimal Assist to bed side commode Completed belinda hygiene while standing during bathing. Pt has ostomy. Independent    Bed Mobility  Supine to sit: Maximal Assist   Sit to supine: NT     Supine to sit: Independent   Sit to supine: Independent    Functional Transfers Minimal Assist   Sit to stand from EOB with bed slightly elevated  Min A to stand from chair. Cues for hand placement. Independent    Functional Mobility Min/ Mod  Assist with FWW EOB to bedside chair  Pt declined mobility due to having completed PT earlier and states she is tired. Contact Guard Assist    Balance Sitting:     Static:  good    Dynamic:good-  Standing: Min/ Mod A        Activity Tolerance Fair-  Fair + seated. Poor standing. Good- to assist with self care tasks     Comments:  Pt sitting in the chair. Completed bathing. Assist for LB ADL due to poor tolerance for anterior trunk flexion. Assist to stand from chair for ADL activity. Instructed with safety technique. Pt remained seated in the chair at the end of the session. Nursing present. Education/treatment:  ADL retraining with facilitation of movement to increase self care skills. Therapeutic activity to address balance and endurance for ADL and transfers. Pt education of transfer safety, walker safety, and energy conservation. Pt has made  progress towards set goals.        Time In: 8:55  Time Out: 9:35     Min Units   Therapeutic Ex 06786     Therapeutic Activities 56209 97 3   ADL/Self Care 91729 30 2   Orthotic Management 82659     Neuro Re-Ed 13828     Non-Billable Time     TOTAL TIMED TREATMENT 40 1239 [facility] ELDER/L 26850

## 2022-09-06 NOTE — DISCHARGE INSTR - COC
Continuity of Care Form    Patient Name: Aiyana Byers   :  1945  MRN:  [id number]    [facility] date:  2022  Discharge date:  2022    Code Status Order: Full Code   Advance Directives:     Admitting Physician:  Nestor Zapata DO  PCP: Magy Gillespie DO    Discharging Nurse:   [address] Unit/Room#: 9519/8611-P  Discharging Unit Phone Number: [phone]    Emergency Contact:   Extended Emergency Contact Information  Primary Emergency Contact: Raynold Schirmer  Address: [address] [facility] [address]           [address] [place] [address], [address] [place] [address] [address] Phone: [phone]  Relation: Child  Secondary Emergency Contact: [address] Phone: [phone]  Relation: Child    Past Surgical History:  Past Surgical History:   Procedure Laterality Date    ADENOIDECTOMY      CARPAL TUNNEL RELEASE Right     CERVICAL FUSION      COLON SURGERY      COLOSTOMY      EYE SURGERY Bilateral     CATARACT    HERNIA REPAIR      HIP FRACTURE SURGERY Left 9/3/2022    HIP OPEN REDUCTION INTERNAL FIXATION WITH CEPHALOMEDULLARY NAIL [facility]      rt and lt    JOINT REPLACEMENT Bilateral     JOINT REPLACEMENT Right     TONSILLECTOMY      TUBAL LIGATION         Immunization History:   Immunization History   Administered Date(s) Administered    COVID-19, MODERNA BLUE border, Primary or Immunocompromised, (age 12y+), IM, 100 mcg/0.5mL 2021, 2021    Influenza Virus Vaccine 2012, 10/01/2013, 2014, 2016, 2016, 2017, 10/05/2018    Influenza, FLUAD, (age 72 y+), Adjuvanted, 0.5mL 10/01/2021    Pneumococcal Conjugate 13-valent ([id number]) 10/05/2018    Pneumococcal Polysaccharide ([id number]) 2007, 06/15/2010       Active Problems:  Patient Active Problem List   Diagnosis Code    Crohn's colitis, unspecified complication ([facility] A88.594    Acute blood loss anemia D62    Anorectal pain K62.89    Anal stricture K62.4 Attention to ileostomy ([facility] Z43.2    CAD (coronary artery disease), native coronary artery I25.10    Chronic back pain M54.9, G89.29    Crohn's disease without complication ([facility]) R58.65    Demand ischemia ([facility]) I24.8    Dyslipidemia E78.5    Fatigue associated with anemia D64.9    History of anticoagulant therapy Z92.29    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    History of total right hip replacement Z96.641    History of ulcerative colitis Z87.19    Hypertension I10    Myeloproliferative disorder ([facility] D47.1    Other hyperlipidemia E78.49    Pain of right fibula M89.8X6    Pressure ulcer of buttock L89.309    Severe protein-calorie malnutrition (HCC) E43    Type 2 diabetes mellitus with diabetic nephropathy ([facility]) E11.21    Coronary artery disease involving native coronary artery with angina pectoris, unspecified whether native or transplanted heart ([facility]) I25.119    Major depressive disorder, recurrent, mild F33.0    Major depressive disorder, recurrent, moderate F33.1    Major depressive disorder, recurrent, unspecified F33.9    Bronchitis, mucopurulent recurrent ([facility] J41.1    Closed intertrochanteric fracture, left, initial encounter ([facility] S72.142A       Isolation/Infection:   Isolation            No Isolation          Patient Infection Status       None to display            Nurse Assessment:  Last Vital Signs: /75   Pulse 69   Temp 98.1 °F (36.7 °C) (Oral)   Resp 16   Ht 5' 2\" (1.575 m)   Wt 138 lb (62.6 kg)   SpO2 98%   BMI 25.24 kg/m²     Last documented pain score (0-10 scale): Pain Level: 9  Last Weight:   Wt Readings from Last 1 Encounters:   09/02/22 138 lb (62.6 kg)     Mental Status:  alert    IV Access:  - None    Nursing Mobility/ADLs:  Walking   Assisted  Transfer  Assisted  Bathing  Assisted  Dressing  Assisted  Toileting  Assisted  Feeding  Independent  Med Admin  Assisted  Med Delivery   none    Wound Care Documentation and Therapy:  Incision 09/03/22 Femoral Anterior;Left;Proximal (Active)   Dressing Status Breakthrough drainage noted; Intact;Dry 09/05/22 1940   Dressing/Treatment Alginate with Ag 09/04/22 0800   Closure Staples 09/03/22 0838   Drainage Amount Scant 09/05/22 1915   Odor None 09/05/22 1915   Number of days: 3        Elimination:  Continence: Bowel: No  Bladder: No  Urinary Catheter: None   Colostomy/Ileostomy/Ileal Conduit: No  Colostomy Umbilicus-Stomal Appliance: 1 piece, Intact, Dry, Clean  Colostomy Umbilicus-Stoma  Assessment: Unable to assess  Colostomy Umbilicus-Peristomal Assessment: Unable to assess  Colostomy Umbilicus-Treatment: Bag change  Colostomy Umbilicus-Stool Appearance: Soft  Colostomy Umbilicus-Stool Color: Tarun Andrade  Colostomy Umbilicus-Stool Amount: Small  Colostomy Umbilicus-Output (mL): 484 ml    Date of Last BM: 9/5/2022    Intake/Output Summary (Last 24 hours) at 9/6/2022 1007  Last data filed at 9/6/2022 0353  Gross per 24 hour   Intake --   Output 400 ml   Net -400 ml     I/O last 3 completed shifts:  In: -   Out: 600 [Urine:200; Stool:400]    Safety Concerns:     History of Falls (last 30 days)    Impairments/Disabilities:      None    Nutrition Therapy:  Current Nutrition Therapy:   - Oral Diet:  General    Routes of Feeding: Oral  Liquids: Thin Liquids  Daily Fluid Restriction: no  Last Modified Barium Swallow with Video (Video Swallowing Test): not done    Treatments at the Time of Hospital Discharge:   Respiratory Treatments: ***  Oxygen Therapy:  is not on home oxygen therapy.   Ventilator:    - No ventilator support    Rehab Therapies: Physical Therapy and Occupational Therapy  Weight Bearing Status/Restrictions: No weight bearing restrictions  Other Medical Equipment (for information only, NOT a DME order):  walker  Other Treatments: ***    Patient's personal belongings (please select all that are sent with patient):  None    RN SIGNATURE:  Electronically signed by Nhi Mitchell RN on 9/6/22 at 7:28 PM EDT    CASE MANAGEMENT/SOCIAL WORK SECTION    Inpatient Status Date: ***    Readmission Risk Assessment Score:  Readmission Risk              Risk of Unplanned Readmission:  14           Discharging to Facility/ Agency   Name: [facility]  Address:  [address] [place] [address]  Fax:[phone]    Dialysis Facility (if applicable)   Name:  Address:  Dialysis Schedule:  Phone:  Fax:    / signature: Electronically signed by ANDRÉS Rich on 9/6/2022 at 3:42 PM    PHYSICIAN SECTION    Prognosis: {Prognosis:7638401130}    Condition at Discharge: [address] Patient Condition:[id number]}    Rehab Potential (if transferring to Rehab): {Prognosis:6798276957}    Recommended Labs or Other Treatments After Discharge: ***    Physician Certification: I certify the above information and transfer of Leyda Wooten  is necessary for the continuing treatment of the diagnosis listed and that she requires [place] for less 30 days.      Update Admission H&P: {CHP DME Changes in FZDGK:[id number]}    PHYSICIAN SIGNATURE:  Electronically signed by Екатерина Brunner DO on 9/6/22 at 10:07 AM EDT

## 2022-09-06 NOTE — PROGRESS NOTES
Hospitalist Progress Note      PCP: Luis Alberto Medina DO    Date of Admission: 9/2/2022        Hospital Course:  76 y.o. female presented with  FALL, SUSTAINED A FRACTURE OF HER LEFT HIP. WENT FOR OR. STATES SHE WAS REACHING FOR SOMETHING AND FELL HIT HER HEAD ON THE FLOOR, DENIES LOC. Adina Serra ON SEPT 3, 2022. Frank Carr SAYS SHE CAN DC HOME, SHE WANTS A LAURYN. Subjective:  NO COMPLAINTS        Medications:  Reviewed    Infusion Medications    sodium chloride      dextrose      sodium chloride       Scheduled Medications    potassium chloride  40 mEq Oral Once    sodium chloride flush  5-40 mL IntraVENous 2 times per day    aspirin  325 mg Oral BID    insulin lispro  0-10 Units SubCUTAneous 4x Daily AC & HS    citalopram  20 mg Oral Daily    furosemide  20 mg Oral Daily    hydroxyurea  500 mg Oral Daily    metoprolol tartrate  50 mg Oral Daily    atorvastatin  20 mg Oral Daily    zolpidem  5 mg Oral Nightly    sodium chloride flush  5-40 mL IntraVENous 2 times per day     PRN Meds: HYDROcodone-acetaminophen, sodium chloride flush, sodium chloride, glucose, dextrose bolus **OR** dextrose bolus, glucagon (rDNA), dextrose, sodium chloride flush, sodium chloride, ondansetron **OR** ondansetron, polyethylene glycol, acetaminophen **OR** acetaminophen, morphine, albuterol      Intake/Output Summary (Last 24 hours) at 9/6/2022 1413  Last data filed at 9/6/2022 0353  Gross per 24 hour   Intake --   Output 400 ml   Net -400 ml       Exam:    BP (!) 126/55   Pulse (!) 110   Temp 98.4 °F (36.9 °C) (Oral)   Resp 18   Ht 5' 2\" (1.575 m)   Wt 138 lb (62.6 kg)   SpO2 99%   BMI 25.24 kg/m²      General appearance:  No apparent distress,   HEENT:  Normal cephalic, Conjunctivae/corneas clear. HEMATOMA RIGHT TEMPORAL AREA, ECCHYMTOIC  Neck: Supple, with full range of motion. No jugular venous distention. Trachea midline. Respiratory:  Normal respiratory effort.  Clear to auscultation, b  Cardiovascular:  Regular rate and rhythm   Abdomen: Soft, non-tender, non-distended with normal bowel sounds. COLOSTOMY INTACT, WITH BROWN STOOL  Musculoskeletal:  No clubbing, cyanosis or edema bilaterally. DSD INTACT LEFT HIP   Skin: Skin color, texture, turgor normal.  No rashes or lesions. Neurologic:  Neurovascularly intact   Psychiatric:  Alert and oriented,                       Labs:   No results for input(s): WBC, HGB, HCT, PLT in the last 72 hours. Recent Labs     09/04/22  0459 09/05/22  0656 09/06/22  0337    139 142   K 3.7 3.7 3.4*    106 108*   CO2 23 26 24   BUN 21 22 23   CREATININE 0.7 0.6 0.6   CALCIUM 8.3* 8.5* 8.5*     No results for input(s): AST, ALT, BILIDIR, BILITOT, ALKPHOS in the last 72 hours. No results for input(s): INR in the last 72 hours. No results for input(s): Honey Gazella in the last 72 hours. No results for input(s): AST, ALT, ALB, BILIDIR, BILITOT, ALKPHOS in the last 72 hours. No results for input(s): LACTA in the last 72 hours. No results found for: Ligia Morale  No results found for: AMMONIA    Assessment:    Active Hospital Problems    Diagnosis Date Noted    Closed intertrochanteric fracture, left, initial encounter ([facility] [address] Elder Ano 09/02/2022     Priority: Medium   CROHN'S DISEASE   H/O COLOSTOMY   HLD   HTN   II DM           PLAN:  PAIN MANAGEMENT   LOPRESSOR   HYDREA   LASIX   LIPITOR   SSI         DVT Prophylaxis:   SCD  Diet: ADULT DIET;  Regular  Code Status: FULL CODE     PT/OT Eval Status: ORDERED  IKE,  LAURYN    Electronically signed by Mardy Angelucci, DO on 9/6/2022 at 2:13 PM [facility]

## 2022-09-06 NOTE — PROGRESS NOTES
Daily Treat        Attending Provider:  Latonya Hand DO    Evaluating PT:  Guilherme Cheng PT, RRT, CEAS    Room #:  1650/7152-N  Diagnosis:  S/P fall with Left hip intertrochanteric fracture  Pertinent PMHx/PSHx:  B/L TKA, RT MACKENZIE  Procedure/Surgery:  Left hip reduction and fixation with cephalomedullary nail 9/3/22  Precautions:  WBAT, falls,   Equipment Needs:  [employer]    SUBJECTIVE:    Pt lives with alone in a 1 story home with 0 stairs and 0 rail to enter. Pt ambulated with SC when outside home PTA. OBJECTIVE:    Treatment  Date: 9/3/22 PM Treatment  9/6/22  Short Term/ Long Term   Goals   Was pt agreeable to Eval/treatment? Yes Yes    Does pt have pain? LT Hip Moderate to Minimal R hip pain     Bed Mobility  Rolling: NA  Supine to sit:    Sit to supine: Mod/max x 2  Scooting: Mod Supine to sit: Mod A  Scooting: Mod A seated to EOB       Roll: S/Indep   Sup-sit: Min/CGA   Sit-sup: Min   Scoot: Indep   Transfers Sit to stand: Min x 2  Stand to sit: Min x 2  Stand pivot: NA Sit <> stand: Min A CGA/SBA x 1    Ambulation   5 feet with [employer] Min x 2 chair-bed 40 feet x 1 using [employer] for support Min A for balance. See comments  feet with [employer] CGA/SBA   Stair negotiation: ascended and descended  NA  NA     ROM See comments below NT    MMT LT hip 3-/5, knee 4/5 NT Imp LTLE . 5 to 1 grade globally   AM-PAC 6 Clicks 08/44  79/93          Pt is alert and able to follow instruction  Balance: poor dynamic using [employer] for support    Pt performed therapeutic exercise of the following: supine B ankle pumps, quad/glut sets AROM; L LE heel slide, hip ABd AAROM x 20    Patient education/treatment  Pt was educated on therapeutic exercise promoting circulation, ROM and strengthening, UE usage to assist with transfer safety, gait mechanics promoting upright posture, sequence, step length and staying within [employer] base of support.      Patient response to education:   Pt verbalized understanding Pt demonstrated skill Pt requires further education in this area   yes With instruction yes     ASSESSMENT:   Comments: Pt sat EOB SBA for balance. Reena slow, inconsistent and laboring. Pt displayed initial short stepping R LE, progressed to step through non reciprocal gait pattern. Pt unsteady throughout, requires constant hands on assist for balance and safety. No dizziness or shortness of breath noted. Pt was left in a bedside chair with call light in reach, waffle cushion in place    Time in 0810   Time out 0838   Total Treatment Time 28 minutes   CPT codes:     Therapeutic activities 80238 14 minutes   Therapeutic exercises 67664 14 minutes       Pt is making fair progress toward established Physical Therapy goals. Continue with physical therapy current plan of care.     Elías Murphy PTA   License Number: [id number]

## 2022-09-06 NOTE — CARE COORDINATION
Social Work:    Reviewed chart notes. Mrs. Zahra Soto underwent a left hip ORIF on the 3rd after a fall at home. Social service met with Mrs. Marcos, advised her about social work role, and we discussed discharge planning. Mrs. Zahra Soto is a patient of Dr. Nya Patterson. She resides alone in a one floor condo and used a cane prior to admission. Mrs. Marcos's advised social work that she prefers rehab at [place] as it is close to home. Social work called a referral in to [place] [facility] who accepted.       Electronically signed by ANDRÉS Leger on 9/6/2022 at 10:52 AM

## 2022-09-07 NOTE — PROGRESS NOTES
[facility] called and stated the ETA has been delayed for approximately 90 mins. Hourly rounding continues.

## 2022-09-07 NOTE — PLAN OF CARE
Problem: ABCDS Injury Assessment  Goal: Absence of physical injury  9/6/2022 2345 by Zelalem Tijerina RN  Outcome: Progressing  9/6/2022 1722 by Imelda Wilburn RN  Outcome: Progressing     Problem: Pain  Goal: Verbalizes/displays adequate comfort level or baseline comfort level  9/6/2022 2345 by Zelalem Tijerina RN  Outcome: Progressing  9/6/2022 1722 by Imelda Wilburn RN  Outcome: Progressing     Problem: Discharge Planning  Goal: Discharge to home or other facility with appropriate resources  9/6/2022 2345 by Zelalem Tijerina RN  Outcome: Progressing  9/6/2022 1722 by Imelda Wilubrn RN  Outcome: Progressing     Problem: Skin/Tissue Integrity  Goal: Absence of new skin breakdown  Description: 1. Monitor for areas of redness and/or skin breakdown  2. Assess vascular access sites hourly  3. Every 4-6 hours minimum:  Change oxygen saturation probe site  4. Every 4-6 hours:  If on nasal continuous positive airway pressure, respiratory therapy assess nares and determine need for appliance change or resting period.   9/6/2022 2345 by Zelalem Tijerina RN  Outcome: Progressing  9/6/2022 1722 by Imelda Wilburn RN  Outcome: Progressing     Problem: Safety - Adult  Goal: Free from fall injury  9/6/2022 2345 by Zelalem Tijerina RN  Outcome: Progressing  9/6/2022 1722 by Imelda Wilburn RN  Outcome: Progressing     Problem: Chronic Conditions and Co-morbidities  Goal: Patient's chronic conditions and co-morbidity symptoms are monitored and maintained or improved  9/6/2022 2345 by Zelalem Tijerina RN  Outcome: Progressing  9/6/2022 1722 by Imelda Wilburn RN  Outcome: Progressing

## 2022-09-14 DIAGNOSIS — M54.42 CHRONIC MIDLINE LOW BACK PAIN WITH LEFT-SIDED SCIATICA: ICD-10-CM

## 2022-09-14 DIAGNOSIS — G89.29 CHRONIC MIDLINE LOW BACK PAIN WITH LEFT-SIDED SCIATICA: ICD-10-CM

## 2022-09-14 RX ORDER — HYDROCODONE BITARTRATE AND ACETAMINOPHEN 7.5; 325 MG/1; MG/1
1 TABLET ORAL 4 TIMES DAILY
Qty: 120 TABLET | Refills: 0 | Status: SHIPPED
Start: 2022-09-14 | End: 2022-10-07 | Stop reason: SDUPTHER

## 2022-09-14 NOTE — TELEPHONE ENCOUNTER
----- Message from Jolie Ruby sent at 9/14/2022 11:51 AM EDT -----  Subject: Refill Request    QUESTIONS  Name of Medication? HYDROcodone-acetaminophen (NORCO) 7.5-325 MG per   tablet  Patient-reported dosage and instructions? 7.5 mg 4 times daily   How many days do you have left? Unknown  Preferred Pharmacy? [address] phone number (if available)? [phone]  Additional Information for Provider? 120 count  ---------------------------------------------------------------------------  --------------  CALL BACK INFO  What is the best way for the office to contact you? OK to leave message on   voicemail  Preferred Call Back Phone Number? [phone]  ---------------------------------------------------------------------------  --------------  SCRIPT ANSWERS  Relationship to Patient?  Self

## 2022-09-15 NOTE — DISCHARGE SUMMARY
Hospitalist Discharge Summary    Patient ID: Elizabeth Edmond   Patient : 1945  Patient's PCP: Samantha New DO    Admit Date: 2022   Admitting Physician: Mary Roca DO    Discharge Date:  9/15/2022   Discharge Physician: Mary Roca DO   Discharge Condition: Stable  Discharge Disposition: Skilled Facility      Discharge Diagnoses: Active Hospital Problems    Diagnosis Date Noted    Closed intertrochanteric fracture, left, initial encounter [facility]) Darylene Frederic 2022     Priority: Medium   Left hip reduction and fixation with cephalomedullary nail     CROHN'S DISEASE   H/O COLOSTOMY   HLD   HTN   II DM         Hospital course in brief:  76 y.o. female presented with  FALL, SUSTAINED A FRACTURE OF HER LEFT HIP. WENT FOR OR. STATES SHE WAS REACHING FOR SOMETHING AND FELL HIT HER HEAD ON THE FLOOR, DENIES LOC. [place] ON SEPT 3, 2022. Yvonne Santos SAYS SHE CAN DC HOME, SHE WANTS A LAURYN. PHYSICAL EXAM:    /60   Pulse 62   Temp 98.8 °F (37.1 °C) (Oral)   Resp 16   Ht 5' 2\" (1.575 m)   Wt 138 lb (62.6 kg)   SpO2 97%   BMI 25.24 kg/m²     General appearance:  No apparent distress,   HEENT:  Normal cephalic, Conjunctivae/corneas clear. HEMATOMA RIGHT TEMPORAL AREA, ECCHYMTOIC  Neck: Supple, with full range of motion. No jugular venous distention. Trachea midline. Respiratory:  Normal respiratory effort. Clear to auscultation, b  Cardiovascular:  Regular rate and rhythm   Abdomen: Soft, non-tender, non-distended with normal bowel sounds. COLOSTOMY INTACT, WITH BROWN STOOL  Musculoskeletal:  No clubbing, cyanosis or edema bilaterally. DSD INTACT LEFT HIP   Skin: Skin color, texture, turgor normal.  No rashes or lesions. Neurologic:  Neurovascularly intact   Psychiatric:  Alert and oriented,            Prior to Admission medications    Medication Sig Start Date End Date Taking?  Authorizing Provider   HYDROcodone-acetaminophen (NORCO) 7.5-325 MG per tablet Take 1 tablet by mouth 4 times daily for 30 days. 9/14/22 10/14/22  Lambert Combe, DO   insulin lispro (HUMALOG) 100 UNIT/ML SOLN injection vial Inject 0-10 Units into the skin 4 times daily (before meals and nightly) 9/6/22  Yes Dorrine Simple Kindra, DO   aspirin 325 MG EC tablet Take 1 tablet by mouth 2 times daily 9/6/22  Yes Dorrine Simple Kindra, DO   dicyclomine (BENTYL) 20 MG tablet Take 1 tablet by mouth 2 times daily as needed (ibs) 9/3/22 3/2/23 Yes Katja Kamara, DO   furosemide (LASIX) 20 MG tablet Take 1 tablet by mouth daily 8/24/22 2/20/23  Lambert Combe, DO   simvastatin (ZOCOR) 40 MG tablet Take 1 tablet by mouth nightly 8/24/22 11/22/22  Lambert Combe, DO   zolpidem (AMBIEN) 5 MG tablet take 1 tablet by mouth nightly 8/24/22 11/16/22  Lambert Combe, DO   LORazepam (ATIVAN) 0.5 MG tablet Take 1 tablet by mouth daily as needed.  8/16/22   Historical Provider, MD   albuterol sulfate HFA (PROVENTIL HFA) 108 (90 Base) MCG/ACT inhaler Inhale 2 puffs into the lungs every 6 hours as needed for Wheezing 7/25/22 Lambert Combe, DO   metoprolol tartrate (LOPRESSOR) 50 MG tablet Take 1 tablet by mouth daily 5/12/22   Erin Funk APRN - CNP   cholestyramine Ryann Jean) 4 g packet Take 1 packet by mouth daily 3/10/22   Historical Provider, MD   omeprazole (PRILOSEC) 40 MG delayed release capsule Take 1 capsule by mouth daily 4/20/22 10/17/22  Lambert Combe, DO   hydroxyurea (HYDREA) 500 MG chemo capsule Take 1 capsule by mouth daily 4/20/22 10/17/22  Lambert Combe, DO   folic acid (FOLVITE) 1 MG tablet Take 1 tablet by mouth daily 2/17/22 9/2/22  Lennox Matamoros APRN - CNP   citalopram (CELEXA) 40 MG tablet Take 1 tablet by mouth daily  Patient taking differently: Take 20 mg by mouth daily 10/1/21 9/2/22  Lambert Combe, DO   Handicap Placard MISC Permanent disability   5 years 5/17/21   Rodney Wilson DO   diclofenac sodium (VOLTAREN) 1 % GEL Apply 2 g topically 2 times daily as needed    Historical Provider, MD Cholecalciferol (VITAMIN D3 PO) Take 1,000 Units by mouth daily    Historical Provider, MD   Multiple Vitamins-Minerals (THERAPEUTIC MULTIVITAMIN-MINERALS) tablet Take 1 tablet by mouth daily    Historical Provider, MD       Consults:   [id number] [place] TO INTERNAL [facility]ICINE            Discharge Instructions / Follow up:    Future Appointments   Date Time Provider Brent Rueda   9/16/2022  9:20 AM Shavonne Solis MD [facility]   10/10/2022  1:00 PM DO [facility]A [facility]   7/31/2023  1:00 PM DO TEA [facility]       Continued appropriate risk factor modification of blood pressure, diabetes and serum lipids will remain essential to reducing risk of future atherosclerotic development    Activity: activity as tolerated    Significant labs:  CBC:   No results for input(s): WBC, RBC, HGB, HCT, MCV, RDW, PLT in the last 72 hours. BMP: No results for input(s): NA, K, CL, CO2, BUN, CREATININE, CA, MG, PHOS in the last 72 hours. LFT:  No results for input(s): PROT, ALB, ALKPHOS, ALT, AST, BILITOT, AMYLASE, LIPASE in the last 72 hours. PT/INR: No results for input(s): INR, APTT in the last 72 hours. BNP: No results for input(s): BNP in the last 72 hours.   Hgb A1C:   Lab Results   Component Value Date    LABA1C 5.3 09/03/2022     Folate and B12: No results found for: [id number], No results found for: FOLATE  Thyroid Studies:   Lab Results   Component Value Date    TSH 1.930 03/18/2022    B9QQOGO 7.4 03/18/2022       Urinalysis:    Lab Results   Component Value Date/Time    NITRU Negative 04/29/2014 12:22 PM    WBCUA NONE 04/29/2014 12:22 PM    BACTERIA NONE 04/29/2014 12:22 PM    RBCUA NONE 04/29/2014 12:22 PM    BLOODU Negative 04/29/2014 12:22 PM    SPECGRAV 1.025 04/29/2014 12:22 PM    GLUCOSEU 250 04/29/2014 12:22 PM       Imaging:  XR HIP LEFT (2-3 VIEWS)    Result Date: 9/3/2022  EXAMINATION: TWO XRAY VIEWS OF THE LEFT HIP 9/3/2022 9:20 am COMPARISON: None. HISTORY: ORDERING SYSTEM PROVIDED HISTORY: post op pacu TECHNOLOGIST PROVIDED HISTORY: Reason for exam:->post op pacu FINDINGS: Two views of the left hip reveal patient to be status post fixation of an intertrochanteric fracture of the left femur. There is satisfactory alignment with no hardware complication. Postsurgical changes seen in the soft tissues. Fixation of an intertrochanteric fracture of the left femur with satisfactory alignment of the fracture no hardware complication. XR FEMUR LEFT (MIN 2 VIEWS)    Result Date: 9/2/2022  EXAMINATION: 2 XRAY VIEWS OF THE LEFT FEMUR; ONE XRAY VIEW OF THE PELVIS AND TWO XRAY VIEWS LEFT HIP 9/2/2022 11:39 am COMPARISON: None. HISTORY: ORDERING SYSTEM PROVIDED HISTORY: fall TECHNOLOGIST PROVIDED HISTORY: Reason for exam:->fall FINDINGS: Nondisplaced intertrochanteric fracture at the left femur. Anatomically aligned left TKA. Pelvis is intact. Bowel ostomy is evident at the right lower pelvis. Nondisplaced intertrochanteric fracture on the left. XR TIBIA FIBULA LEFT (2 VIEWS)    Result Date: 9/2/2022  EXAMINATION: XRAY VIEWS OF THE LEFT TIBIA AND FIBULA 9/2/2022 12:39 pm COMPARISON: None. HISTORY: ORDERING SYSTEM PROVIDED HISTORY: pain TECHNOLOGIST PROVIDED HISTORY: Reason for exam:->pain FINDINGS: Total knee prosthesis anatomically aligned. There are no acute fractures or prosthetic complications. Arthritic changes in the ankle. No acute bony abnormalities or prosthetic complications. CT Head WO Contrast    Result Date: 9/2/2022  EXAMINATION: CT OF THE HEAD WITHOUT CONTRAST  9/2/2022 12:39 pm TECHNIQUE: CT of the head was performed without the administration of intravenous contrast. Automated exposure control, iterative reconstruction, and/or weight based adjustment of the mA/kV was utilized to reduce the radiation dose to as low as reasonably achievable. COMPARISON: None.  HISTORY: ORDERING SYSTEM PROVIDED HISTORY: fall TECHNOLOGIST PROVIDED HISTORY: Reason for exam:->fall Has a \"code stroke\" or \"stroke alert\" been called? ->No Decision Support Exception - unselect if not a suspected or confirmed emergency medical condition->Emergency Medical Condition (MA) FINDINGS: BRAIN/VENTRICLES: There is no acute intracranial hemorrhage, mass effect or midline shift. No abnormal extra-axial fluid collection. The gray-white differentiation is maintained without evidence of an acute infarct. There is no evidence of hydrocephalus. The ventricles, cisterns and sulci are prominent consistent with atrophy. There is decreased attenuation within the periventricular white matter consistent with periventricular leukomalacia. ORBITS: The visualized portion of the orbits demonstrate no acute abnormality. SINUSES: The visualized paranasal sinuses and mastoid air cells demonstrate no acute abnormality. SOFT TISSUES/SKULL: There is a small right frontal scalp contusion. .     1.  There is no acute intracranial abnormality. Specifically, there is no intracranial hemorrhage. 2. Atrophy and periventricular leukomalacia, 3. Small right frontal scalp contusion. .     CT Cervical Spine WO Contrast    Result Date: 9/2/2022  EXAMINATION: CT OF THE CERVICAL SPINE WITHOUT CONTRAST 9/2/2022 12:39 pm TECHNIQUE: CT of the cervical spine was performed without the administration of intravenous contrast. Multiplanar reformatted images are provided for review. Automated exposure control, iterative reconstruction, and/or weight based adjustment of the mA/kV was utilized to reduce the radiation dose to as low as reasonably achievable. COMPARISON: None. HISTORY: ORDERING SYSTEM PROVIDED HISTORY: fall TECHNOLOGIST PROVIDED HISTORY: Reason for exam:->fall Decision Support Exception - unselect if not a suspected or confirmed emergency medical condition->Emergency Medical Condition (MA) FINDINGS: The ring of C1 is intact as is the dense.   There is no compression fracture of the cervical spine. No jumped or perched facet is noted. The patient has undergone anterior fusion C3 through C6. Intervertebral body grafts are seen at the C3-4, C4-5 and C5-6 levels. There is no hardware complication Multilevel degenerative disc and degenerative joint disease is noted. The prevertebral soft tissues are unremarkable. The airway is widely patent. Images through the lung apices are negative for a pneumothorax. There is a 1.4 cm cystic nodule within the right thyroid lobe. 1. There is no acute compression fracture or subluxation of the cervical spine. 2. Multilevel degenerative disc and degenerative joint disease. 3. Previous anterior fusion of C3 through C6. There is no hardware complication. CT LUMBAR SPINE WO CONTRAST    Result Date: 9/2/2022  EXAMINATION: CT OF THE LUMBAR SPINE WITHOUT CONTRAST  9/2/2022 TECHNIQUE: CT of the lumbar spine was performed without the administration of intravenous contrast. Multiplanar reformatted images are provided for review. Adjustment of mA and/or kV according to patient size was utilized. Automated exposure control, iterative reconstruction, and/or weight based adjustment of the mA/kV was utilized to reduce the radiation dose to as low as reasonably achievable. COMPARISON: CT lumbar spine 04/05/2020 HISTORY: ORDERING SYSTEM PROVIDED HISTORY: pain TECHNOLOGIST PROVIDED HISTORY: Reason for exam:->pain Decision Support Exception - unselect if not a suspected or confirmed emergency medical condition->Emergency Medical Condition (MA) FINDINGS: The bones are markedly demineralized. There is levoscoliosis in the lumbar spine. There is grade 1 anterolisthesis at L4-5. There is minimal grade 1 retrolisthesis at T12-L1 and L1-2. There are no compression deformities. There is narrowing of the intervertebral disc space heights at all levels throughout the lumbar spine. There is vacuum disc phenomenon at L1-2 and L4-5. There is no evidence of acute fracture. There is multilevel degenerative disc disease and facet arthropathy. There is multilevel central canal stenosis and neural foraminal narrowing. There is atherosclerotic disease without abdominal aortic aneurysm. There is left nonobstructive nephrolithiasis. There is cholelithiasis. 1. No acute fracture or subluxation. 2. Marked bony demineralization with degenerative changes throughout the lumbar spine. There is multilevel central canal stenosis and neural foraminal narrowing. 3. Cholelithiasis. 4. Left nonobstructive nephrolithiasis. FLUORO FOR SURGICAL PROCEDURES    Result Date: 9/3/2022  EXAMINATION: SPOT FLUOROSCOPIC IMAGES 9/3/2022 7:49 am TECHNIQUE: Fluoroscopy was provided by the radiology department for procedure. Radiologist was not present during examination. FLUOROSCOPY DOSE AND TYPE OR TIME AND EXPOSURES: Fluoroscopy time was 81 seconds. 6 fluoroscopic spot images of the left hip were obtained. COMPARISON: None HISTORY: ORDERING SYSTEM PROVIDED HISTORY: left hip orif TECHNOLOGIST PROVIDED HISTORY: Reason for exam:->left hip orif Intraprocedural imaging. FINDINGS: There is internal fixation of an intertrochanteric fracture of the proximal left femur with hip screw placement. Left hip screw placement. XR HIP 2-3 VW W PELVIS LEFT    Result Date: 9/2/2022  EXAMINATION: 2 XRAY VIEWS OF THE LEFT FEMUR; ONE XRAY VIEW OF THE PELVIS AND TWO XRAY VIEWS LEFT HIP 9/2/2022 11:39 am COMPARISON: None. HISTORY: ORDERING SYSTEM PROVIDED HISTORY: fall TECHNOLOGIST PROVIDED HISTORY: Reason for exam:->fall FINDINGS: Nondisplaced intertrochanteric fracture at the left femur. Anatomically aligned left TKA. Pelvis is intact. Bowel ostomy is evident at the right lower pelvis. Nondisplaced intertrochanteric fracture on the left.        Discharge Medications:      Medication List        START taking these medications      insulin lispro 100 UNIT/ML Soln injection vial  Commonly known as: HUMALOG  Inject 0-10 Units into the skin 4 times daily (before meals and nightly)            CHANGE how you take these medications      aspirin 325 MG EC tablet  Take 1 tablet by mouth 2 times daily  What changed:   medication strength  how much to take  when to take this     citalopram 40 MG tablet  Commonly known as: CeleXA  Take 1 tablet by mouth daily  What changed: how much to take     dicyclomine 20 MG tablet  Commonly known as: BENTYL  Take 1 tablet by mouth 2 times daily as needed (ibs)  What changed:   when to take this  reasons to take this            CONTINUE taking these medications      albuterol sulfate  (90 Base) MCG/ACT inhaler  Commonly known as: Proventil HFA  Inhale 2 puffs into the lungs every 6 hours as needed for Wheezing     cholestyramine 4 g packet  Commonly known as: QUESTRAN     diclofenac sodium 1 % Gel  Commonly known as: VOLTAREN     folic acid 1 MG tablet  Commonly known as: FOLVITE  Take 1 tablet by mouth daily     furosemide 20 MG tablet  Commonly known as: LASIX  Take 1 tablet by mouth daily     Handicap Placard Misc  Permanent disability   5 years     hydroxyurea 500 MG chemo capsule  Commonly known as: HYDREA  Take 1 capsule by mouth daily     LORazepam 0.5 MG tablet  Commonly known as: ATIVAN     metoprolol tartrate 50 MG tablet  Commonly known as: LOPRESSOR  Take 1 tablet by mouth daily     omeprazole 40 MG delayed release capsule  Commonly known as: PRILOSEC  Take 1 capsule by mouth daily     simvastatin 40 MG tablet  Commonly known as: ZOCOR  Take 1 tablet by mouth nightly     therapeutic multivitamin-minerals tablet     VITAMIN D3 PO     zolpidem 5 MG tablet  Commonly known as: AMBIEN  take 1 tablet by mouth nightly               Where to Get Your Medications        These medications were sent to [facility] [address] [address] [place] [address]      Phone: [phone]   dicyclomine 20 MG tablet Information about where to get these medications is not yet available    Ask your nurse or doctor about these medications  aspirin 325 MG EC tablet  insulin lispro 100 UNIT/ML Soln injection vial         Time Spent on discharge is more than 45 minutes in the examination, evaluation, counseling and review of medications and discharge plan.    +++++++++++++++++++++++++++++++++++++++++++++++++  Mary Roca, DO  [address], [place]  +++++++++++++++++++++++++++++++++++++++++++++++++  NOTE: This report was transcribed using voice recognition software. Every effort was made to ensure accuracy; however, inadvertent computerized transcription errors may be present.

## 2022-09-16 ENCOUNTER — OFFICE VISIT (OUTPATIENT)
Dept: ORTHOPEDIC SURGERY | Age: 77
End: 2022-09-16

## 2022-09-16 VITALS — HEIGHT: 62 IN | WEIGHT: 138 LBS | BODY MASS INDEX: 25.4 KG/M2

## 2022-09-16 DIAGNOSIS — Z87.81 STATUS POST-OPERATIVE REPAIR OF CLOSED FRACTURE OF LEFT HIP: Primary | ICD-10-CM

## 2022-09-16 DIAGNOSIS — Z98.890 STATUS POST-OPERATIVE REPAIR OF CLOSED FRACTURE OF LEFT HIP: Primary | ICD-10-CM

## 2022-09-16 PROCEDURE — 99024 POSTOP FOLLOW-UP VISIT: CPT | Performed by: ORTHOPAEDIC SURGERY

## 2022-09-16 NOTE — PROGRESS NOTES
Follow Up Post Operative Visit     Surgery: Left hip reduction and fixation with cephalomedullary nail  Date: 9/3/2022    Subjective:    Aiyana Byers is here for follow up visit s/p above procedure. She is doing well. She has been getting around well making good progress at her skilled nursing facility. Controlled Substances Monitoring:        Physical Exam:    No data recorded    General: Alert and oriented x3, no acute distress  Cardiovascular/pulmonary: No labored breathing, peripheral perfusion intact  Musculoskeletal:    Exam of the left hip shows intact incisions. There is minimal swelling. Compartments are soft. There is intact motor and sensory function of the foot      Imaging: X-rays of the left hip and femur show maintained anatomic alignment of her intertrochanteric fracture with intact hardware    Assessment and Plan: 2 weeks out from left hip short cephalomedullary nail for intertrochanteric fracture  She doing well. She can continue to progress weightbearing as tolerated. Incisions look good today, no dressing required.   Follow-up in 6 weeks with images    Deshaun Chapa MD  Orthopaedic Surgery   9/16/22  9:24 AM

## 2022-10-07 DIAGNOSIS — M54.42 CHRONIC MIDLINE LOW BACK PAIN WITH LEFT-SIDED SCIATICA: ICD-10-CM

## 2022-10-07 DIAGNOSIS — G89.29 CHRONIC MIDLINE LOW BACK PAIN WITH LEFT-SIDED SCIATICA: ICD-10-CM

## 2022-10-07 RX ORDER — HYDROCODONE BITARTRATE AND ACETAMINOPHEN 7.5; 325 MG/1; MG/1
1 TABLET ORAL 4 TIMES DAILY
Qty: 120 TABLET | Refills: 0 | Status: SHIPPED
Start: 2022-10-07 | End: 2022-11-04 | Stop reason: SDUPTHER

## 2022-10-07 NOTE — TELEPHONE ENCOUNTER
----- Message from Lisa Bolden sent at 10/7/2022  1:19 PM EDT -----  Subject: Refill Request    QUESTIONS  Name of Medication? HYDROcodone-acetaminophen (NORCO) 7.5-325 MG per   tablet  Patient-reported dosage and instructions? 7.5-325 mg; 4 times a day  How many days do you have left? 20  Preferred Pharmacy? [address] phone number (if available)? [phone]  ---------------------------------------------------------------------------  --------------  CALL BACK INFO  What is the best way for the office to contact you? OK to leave message on   voicemail  Preferred Call Back Phone Number? [phone]  ---------------------------------------------------------------------------  --------------  SCRIPT ANSWERS  Relationship to Patient?  Self

## 2022-10-26 DIAGNOSIS — F41.9 ANXIETY: Primary | ICD-10-CM

## 2022-10-26 RX ORDER — LORAZEPAM 0.5 MG/1
0.5 TABLET ORAL DAILY PRN
Qty: 30 TABLET | Refills: 2 | Status: SHIPPED
Start: 2022-10-26 | End: 2022-11-25 | Stop reason: SDUPTHER

## 2022-10-26 RX ORDER — FOLIC ACID 1 MG/1
1 TABLET ORAL DAILY
Qty: 90 TABLET | Refills: 1 | Status: SHIPPED | OUTPATIENT
Start: 2022-10-26 | End: 2023-01-24

## 2022-10-26 NOTE — TELEPHONE ENCOUNTER
----- Message from Lisa Bolden sent at 10/26/2022 10:28 AM EDT -----  Subject: Refill Request    QUESTIONS  Name of Medication? folic acid (FOLVITE) 1 MG tablet  Patient-reported dosage and instructions? 1 mg; Once a day  How many days do you have left? 4  Preferred Pharmacy? [address] phone number (if available)? [phone]  ---------------------------------------------------------------------------  --------------,  Name of Medication? LORazepam (ATIVAN) 0.5 MG tablet  Patient-reported dosage and instructions? 0.5 mg; Once a day  How many days do you have left? 6  Preferred Pharmacy? [address] phone number (if available)? [phone]  ---------------------------------------------------------------------------  --------------  CALL BACK INFO  What is the best way for the office to contact you? OK to leave message on   voicemail  Preferred Call Back Phone Number? [phone]  ---------------------------------------------------------------------------  --------------  SCRIPT ANSWERS  Relationship to Patient?  Self

## 2022-11-04 ENCOUNTER — OFFICE VISIT (OUTPATIENT)
Dept: FAMILY MEDICINE CLINIC | Age: 77
End: 2022-11-04
Payer: MEDICARE

## 2022-11-04 VITALS
HEIGHT: 62 IN | TEMPERATURE: 97.8 F | OXYGEN SATURATION: 98 % | SYSTOLIC BLOOD PRESSURE: 122 MMHG | BODY MASS INDEX: 24.8 KG/M2 | DIASTOLIC BLOOD PRESSURE: 78 MMHG | WEIGHT: 134.8 LBS | HEART RATE: 60 BPM

## 2022-11-04 DIAGNOSIS — F51.01 PRIMARY INSOMNIA: ICD-10-CM

## 2022-11-04 DIAGNOSIS — D64.9 FATIGUE ASSOCIATED WITH ANEMIA: ICD-10-CM

## 2022-11-04 DIAGNOSIS — S72.002S CLOSED FRACTURE OF LEFT HIP, SEQUELA: Primary | ICD-10-CM

## 2022-11-04 DIAGNOSIS — E11.21 TYPE 2 DIABETES MELLITUS WITH DIABETIC NEPHROPATHY, WITH LONG-TERM CURRENT USE OF INSULIN (HCC): ICD-10-CM

## 2022-11-04 DIAGNOSIS — N76.0 ACUTE VAGINITIS: ICD-10-CM

## 2022-11-04 DIAGNOSIS — F33.0 MAJOR DEPRESSIVE DISORDER, RECURRENT, MILD (HCC): ICD-10-CM

## 2022-11-04 DIAGNOSIS — D47.1 MYELOPROLIFERATIVE DISORDER (HCC): ICD-10-CM

## 2022-11-04 DIAGNOSIS — L30.9 DERMATITIS: ICD-10-CM

## 2022-11-04 DIAGNOSIS — M54.42 CHRONIC MIDLINE LOW BACK PAIN WITH LEFT-SIDED SCIATICA: ICD-10-CM

## 2022-11-04 DIAGNOSIS — G89.29 CHRONIC MIDLINE LOW BACK PAIN WITH LEFT-SIDED SCIATICA: ICD-10-CM

## 2022-11-04 DIAGNOSIS — K50.119 CROHN'S COLITIS, UNSPECIFIED COMPLICATION (HCC): ICD-10-CM

## 2022-11-04 DIAGNOSIS — E78.49 OTHER HYPERLIPIDEMIA: ICD-10-CM

## 2022-11-04 DIAGNOSIS — K50.90 CROHN'S DISEASE WITHOUT COMPLICATION, UNSPECIFIED GASTROINTESTINAL TRACT LOCATION (HCC): ICD-10-CM

## 2022-11-04 DIAGNOSIS — Z79.4 TYPE 2 DIABETES MELLITUS WITH DIABETIC NEPHROPATHY, WITH LONG-TERM CURRENT USE OF INSULIN (HCC): ICD-10-CM

## 2022-11-04 PROCEDURE — 1090F PRES/ABSN URINE INCON ASSESS: CPT | Performed by: FAMILY MEDICINE

## 2022-11-04 PROCEDURE — G8427 DOCREV CUR MEDS BY ELIG CLIN: HCPCS | Performed by: FAMILY MEDICINE

## 2022-11-04 PROCEDURE — 1123F ACP DISCUSS/DSCN MKR DOCD: CPT | Performed by: FAMILY MEDICINE

## 2022-11-04 PROCEDURE — G8420 CALC BMI NORM PARAMETERS: HCPCS | Performed by: FAMILY MEDICINE

## 2022-11-04 PROCEDURE — 3044F HG A1C LEVEL LT 7.0%: CPT | Performed by: FAMILY MEDICINE

## 2022-11-04 PROCEDURE — G8400 PT W/DXA NO RESULTS DOC: HCPCS | Performed by: FAMILY MEDICINE

## 2022-11-04 PROCEDURE — 99215 OFFICE O/P EST HI 40 MIN: CPT | Performed by: FAMILY MEDICINE

## 2022-11-04 PROCEDURE — G8484 FLU IMMUNIZE NO ADMIN: HCPCS | Performed by: FAMILY MEDICINE

## 2022-11-04 PROCEDURE — 3074F SYST BP LT 130 MM HG: CPT | Performed by: FAMILY MEDICINE

## 2022-11-04 PROCEDURE — 3078F DIAST BP <80 MM HG: CPT | Performed by: FAMILY MEDICINE

## 2022-11-04 PROCEDURE — 1036F TOBACCO NON-USER: CPT | Performed by: FAMILY MEDICINE

## 2022-11-04 RX ORDER — HYDROCODONE BITARTRATE AND ACETAMINOPHEN 7.5; 325 MG/1; MG/1
1 TABLET ORAL 4 TIMES DAILY
Qty: 120 TABLET | Refills: 0 | Status: SHIPPED
Start: 2022-11-04 | End: 2022-11-30 | Stop reason: SDUPTHER

## 2022-11-04 RX ORDER — TRIAMCINOLONE ACETONIDE 0.25 MG/G
CREAM TOPICAL
Qty: 1 EACH | Refills: 2 | Status: SHIPPED | OUTPATIENT
Start: 2022-11-04

## 2022-11-04 RX ORDER — METOPROLOL SUCCINATE 25 MG/1
25 TABLET, EXTENDED RELEASE ORAL DAILY
Qty: 30 TABLET | Refills: 5 | Status: SHIPPED | OUTPATIENT
Start: 2022-11-04

## 2022-11-04 RX ORDER — FLUCONAZOLE 100 MG/1
TABLET ORAL
Qty: 4 TABLET | Refills: 0 | Status: SHIPPED
Start: 2022-11-04 | End: 2022-11-18 | Stop reason: SDUPTHER

## 2022-11-04 NOTE — PROGRESS NOTES
22  Fausto Barrettus : 1945 Sex: female  Age: 76 y.o. Chief Complaint   Patient presents with    Rash     Rash by eye and neck, itchy and red, comes and goes    Yeast Infection     Some white discharge, itchy, burning, been going on for a couple weeks now, gotten worse lately     Medication Check     3 month     Hypertension     She would like to talk to you about her BP, she has been going up and down, but shes had some lows        HPI  post hip fractur left hip   Was in [place] 1 to 2 months     Review of Systems   Constitutional:  Negative for appetite change, chills, diaphoresis, fatigue, fever and unexpected weight change. HENT:  Negative for congestion, dental problem, ear discharge, ear pain, facial swelling, hearing loss, mouth sores, rhinorrhea, sinus pain, sore throat, tinnitus and voice change. Eyes:  Negative for photophobia and visual disturbance (no visual changes). Respiratory:  Positive for wheezing (on occasion   with a deep breath). Negative for apnea, cough, chest tightness and shortness of breath. Cardiovascular:  Negative for chest pain and palpitations. Gastrointestinal:  Positive for abdominal pain. Negative for abdominal distention, constipation, diarrhea, nausea, rectal pain and vomiting. Following with Dr Karla Daugherty    Endocrine: Negative for cold intolerance, heat intolerance and polyuria. Genitourinary:  Positive for urgency. Negative for dysuria, frequency and hematuria. Vaginal problems    Musculoskeletal:  Positive for arthralgias (recent fractured hip was in [place] now out), back pain and gait problem. Negative for joint swelling, myalgias, neck pain and neck stiffness. Skin:  Negative for rash and wound. Allergic/Immunologic: Negative for environmental allergies. Neurological:  Negative for dizziness, tremors, seizures, syncope, facial asymmetry, speech difficulty, weakness, light-headedness, numbness and headaches.    Hematological: Does not bruise/bleed easily. Psychiatric/Behavioral:  Negative for agitation, hallucinations and suicidal ideas. The patient is not nervous/anxious. Physical Exam  Vitals reviewed. Constitutional:       General: She is not in acute distress. Appearance: She is ill-appearing. HENT:      Head: Normocephalic. Right Ear: Tympanic membrane, ear canal and external ear normal. There is no impacted cerumen. Left Ear: Tympanic membrane, ear canal and external ear normal. There is no impacted cerumen. Nose: No rhinorrhea. Mouth/Throat:      Pharynx: No posterior oropharyngeal erythema. Eyes:      General:         Right eye: No discharge. Left eye: No discharge. Extraocular Movements: Extraocular movements intact. Pupils: Pupils are equal, round, and reactive to light. Neck:      Vascular: No carotid bruit. Cardiovascular:      Rate and Rhythm: Regular rhythm. Heart sounds: Murmur (1/6 murmur ) heard. No gallop. Pulmonary:      Breath sounds: Rhonchi present. No rales. Abdominal:      Palpations: Abdomen is soft. There is no mass. Tenderness: There is no abdominal tenderness. There is no guarding or rebound. Hernia: No hernia is present. Comments: Ostomy    Musculoskeletal:         General: Normal range of motion. Cervical back: Neck supple. Comments: Back pain and tenderness in the low back and sacral area   Lymphadenopathy:      Cervical: No cervical adenopathy. Skin:     General: Skin is warm and dry. Findings: Rash (neck area) present. No bruising. Neurological:      General: No focal deficit present. Mental Status: She is alert and oriented to person, place, and time. Cranial Nerves: No cranial nerve deficit. Motor: Weakness present.       Coordination: Coordination abnormal.      Gait: Gait abnormal.   Psychiatric:         Mood and Affect: Mood normal.         Behavior: Behavior normal. Assessment and Plan:  Gerber was seen today for rash, yeast infection, medication check and hypertension. Diagnoses and all orders for this visit:    Closed fracture of left hip, sequela    Chronic midline low back pain with left-sided sciatica  -     HYDROcodone-acetaminophen (NORCO) 7.5-325 MG per tablet; Take 1 tablet by mouth 4 times daily for 30 days. Crohn's colitis, unspecified complication ([facility]    Fatigue associated with anemia    Crohn's disease without complication, unspecified gastrointestinal tract location [facility])    Major depressive disorder, recurrent, mild (HCC)    Primary insomnia    Myeloproliferative disorder ([facility]    Type 2 diabetes mellitus with diabetic nephropathy, with long-term current use of insulin (HCC)    Other hyperlipidemia    Dermatitis    Acute vaginitis    Other orders  -     Discontinue: fluconazole (DIFLUCAN) 100 MG tablet; 2 pills one time dose ,then 2 pills repeat in 5 days if not clear  -     metoprolol succinate (TOPROL XL) 25 MG extended release tablet; Take 1 tablet by mouth daily  -     triamcinolone (KENALOG) 0.025 % cream; Apply topically 2 times daily. Discussions/Education provided to patients during visit:  [] Discussed the importance to stop smoking. [] Advised to monitor eating habits. [] Reviewed and discussed Imaging results. [] Reviewed and discussed Lab results. [x] Discussed the importance of drinking plenty of fluids. [x] Cut down on Salt, Caffeine, and Sugar.  [] Non Compliant Patient   [x] Communicated with patient any concerns, to phone office. Metoprolol to 25 mg daily reduce due to low b p   Dermatitis treated with triamcinalone cream  I reviewed in detail all of her hospital stay and her nursing home stay this was an extensive visit going over the whole situation that she has not as we were somewhat unaware that it was going on and that she was in [place].       Time spent face to face with patient discussing medications and labs:40  minutes. No follow-ups on file.       Seen by:     Campbell Solis DO

## 2022-11-10 RX ORDER — HYDROXYUREA 500 MG/1
500 CAPSULE ORAL DAILY
Qty: 90 CAPSULE | Refills: 1 | Status: SHIPPED | OUTPATIENT
Start: 2022-11-10 | End: 2023-05-09

## 2022-11-10 RX ORDER — DICYCLOMINE HCL 20 MG
20 TABLET ORAL 2 TIMES DAILY PRN
Qty: 60 TABLET | Refills: 0 | Status: SHIPPED | OUTPATIENT
Start: 2022-11-10 | End: 2023-05-09

## 2022-11-10 NOTE — TELEPHONE ENCOUNTER
----- Message from Alicia Brooks sent at 11/10/2022 10:54 AM EST -----  Subject: Refill Request    QUESTIONS  Name of Medication? hydroxyurea (HYDREA) 500 MG chemo capsule  Patient-reported dosage and instructions? 500 MG once daily   How many days do you have left? 8  Preferred Pharmacy? [address] phone number (if available)? [phone]  ---------------------------------------------------------------------------  --------------,  Name of Medication? dicyclomine (BENTYL) 20 MG tablet  Patient-reported dosage and instructions? 20 Mg 2x daily   How many days do you have left? 0  Preferred Pharmacy? [address] phone number (if available)? [phone]  ---------------------------------------------------------------------------  --------------  CALL BACK INFO  What is the best way for the office to contact you? OK to leave message on   voicemail  Preferred Call Back Phone Number? [phone]  ---------------------------------------------------------------------------  --------------  SCRIPT ANSWERS  Relationship to Patient?  Self

## 2022-11-18 RX ORDER — FLUCONAZOLE 100 MG/1
TABLET ORAL
Qty: 4 TABLET | Refills: 0 | Status: SHIPPED | OUTPATIENT
Start: 2022-11-18

## 2022-11-25 DIAGNOSIS — F41.9 ANXIETY: ICD-10-CM

## 2022-11-25 RX ORDER — OMEPRAZOLE 40 MG/1
40 CAPSULE, DELAYED RELEASE ORAL DAILY
Qty: 90 CAPSULE | Refills: 1 | Status: SHIPPED | OUTPATIENT
Start: 2022-11-25 | End: 2023-05-24

## 2022-11-25 RX ORDER — LORAZEPAM 0.5 MG/1
0.5 TABLET ORAL DAILY PRN
Qty: 30 TABLET | Refills: 2 | Status: SHIPPED | OUTPATIENT
Start: 2022-11-25 | End: 2022-12-25

## 2022-11-27 ASSESSMENT — ENCOUNTER SYMPTOMS
SINUS PAIN: 0
SORE THROAT: 0
RHINORRHEA: 0
DIARRHEA: 0
FACIAL SWELLING: 0
ABDOMINAL DISTENTION: 0
VOICE CHANGE: 0
ABDOMINAL PAIN: 1
WHEEZING: 1
APNEA: 0
BACK PAIN: 1
CHEST TIGHTNESS: 0
PHOTOPHOBIA: 0
CONSTIPATION: 0
VOMITING: 0
RECTAL PAIN: 0
COUGH: 0
SHORTNESS OF BREATH: 0
NAUSEA: 0

## 2022-11-29 DIAGNOSIS — M54.42 CHRONIC MIDLINE LOW BACK PAIN WITH LEFT-SIDED SCIATICA: ICD-10-CM

## 2022-11-29 DIAGNOSIS — G89.29 CHRONIC MIDLINE LOW BACK PAIN WITH LEFT-SIDED SCIATICA: ICD-10-CM

## 2022-11-30 RX ORDER — HYDROCODONE BITARTRATE AND ACETAMINOPHEN 7.5; 325 MG/1; MG/1
1 TABLET ORAL 4 TIMES DAILY
Qty: 120 TABLET | Refills: 0 | Status: SHIPPED | OUTPATIENT
Start: 2022-11-30 | End: 2022-12-30

## 2022-12-13 RX ORDER — DICYCLOMINE HCL 20 MG
20 TABLET ORAL 2 TIMES DAILY PRN
Qty: 60 TABLET | Refills: 0 | Status: SHIPPED | OUTPATIENT
Start: 2022-12-13 | End: 2023-06-11

## 2022-12-19 ENCOUNTER — OFFICE VISIT (OUTPATIENT)
Dept: ORTHOPEDIC SURGERY | Age: 77
End: 2022-12-19
Payer: MEDICARE

## 2022-12-19 DIAGNOSIS — Z87.81 STATUS POST-OPERATIVE REPAIR OF CLOSED FRACTURE OF LEFT HIP: Primary | ICD-10-CM

## 2022-12-19 DIAGNOSIS — Z98.890 STATUS POST-OPERATIVE REPAIR OF CLOSED FRACTURE OF LEFT HIP: Primary | ICD-10-CM

## 2022-12-19 PROCEDURE — 1090F PRES/ABSN URINE INCON ASSESS: CPT | Performed by: NURSE PRACTITIONER

## 2022-12-19 PROCEDURE — 1123F ACP DISCUSS/DSCN MKR DOCD: CPT | Performed by: NURSE PRACTITIONER

## 2022-12-19 PROCEDURE — 99213 OFFICE O/P EST LOW 20 MIN: CPT | Performed by: NURSE PRACTITIONER

## 2022-12-19 PROCEDURE — G8427 DOCREV CUR MEDS BY ELIG CLIN: HCPCS | Performed by: NURSE PRACTITIONER

## 2022-12-19 PROCEDURE — 1036F TOBACCO NON-USER: CPT | Performed by: ORTHOPAEDIC SURGERY

## 2022-12-19 PROCEDURE — G8400 PT W/DXA NO RESULTS DOC: HCPCS | Performed by: NURSE PRACTITIONER

## 2022-12-19 PROCEDURE — G8420 CALC BMI NORM PARAMETERS: HCPCS | Performed by: NURSE PRACTITIONER

## 2022-12-19 PROCEDURE — G8484 FLU IMMUNIZE NO ADMIN: HCPCS | Performed by: NURSE PRACTITIONER

## 2022-12-19 NOTE — PROGRESS NOTES
Follow Up Post Operative Visit     Surgery: Left hip reduction and fixation with cephalomedullary nail  Date: 9/3/2022    Subjective:    Disha Queen is here for follow up visit s/p above procedure. She is doing well. She reports symptoms have improved. She is currently ambulating with the assistance of a walker today tolerating well. Patient states that at home she is able to ambulate with a cane. Controlled Substances Monitoring:        Physical Exam:    No data recorded    General: Alert and oriented x3, no acute distress  Cardiovascular/pulmonary: No labored breathing, peripheral perfusion intact  Musculoskeletal:    Exam of the left hip displays intact range of motion without pain or impingement. Neurovascular sensation grossly intact. Gait steady. Imaging: X-ray including 3 views of the left hip and pelvis showing stable alignment of IM nail and screws    Assessment and Plan: Status post left hip reduction and fixation with intramedullary nail x3 months out    Today we discussed her left hip. She is doing very well. She is 3 months out from procedure listed above ambulating with the assistance of a walker. Patient states that while at home she ambulates with a cane. She reports pain has improved and just gets some occasional pain to her thigh. She has no chief complaints. Physical exam displayed intact range of motion and strength. She can continue with activities as tolerated. She will follow-up in 3 months for likely final imaging.       WIL Guzman-CNP  Orthopedic Surgery   12/19/22  1:45 PM

## 2022-12-29 DIAGNOSIS — F51.01 PRIMARY INSOMNIA: ICD-10-CM

## 2022-12-29 DIAGNOSIS — M54.42 CHRONIC MIDLINE LOW BACK PAIN WITH LEFT-SIDED SCIATICA: ICD-10-CM

## 2022-12-29 DIAGNOSIS — G89.29 CHRONIC MIDLINE LOW BACK PAIN WITH LEFT-SIDED SCIATICA: ICD-10-CM

## 2022-12-29 RX ORDER — HYDROCODONE BITARTRATE AND ACETAMINOPHEN 7.5; 325 MG/1; MG/1
1 TABLET ORAL 4 TIMES DAILY
Qty: 120 TABLET | Refills: 0 | Status: SHIPPED | OUTPATIENT
Start: 2022-12-29 | End: 2023-01-28

## 2022-12-29 RX ORDER — ZOLPIDEM TARTRATE 5 MG/1
TABLET ORAL
Qty: 30 TABLET | Refills: 2 | Status: SHIPPED | OUTPATIENT
Start: 2022-12-29 | End: 2023-03-23

## 2023-01-19 ENCOUNTER — OFFICE VISIT (OUTPATIENT)
Dept: FAMILY MEDICINE CLINIC | Age: 78
End: 2023-01-19

## 2023-01-19 VITALS
HEART RATE: 62 BPM | WEIGHT: 132 LBS | HEIGHT: 64 IN | TEMPERATURE: 97.2 F | OXYGEN SATURATION: 100 % | BODY MASS INDEX: 22.53 KG/M2 | DIASTOLIC BLOOD PRESSURE: 64 MMHG | SYSTOLIC BLOOD PRESSURE: 116 MMHG

## 2023-01-19 DIAGNOSIS — E11.9 TYPE 2 DIABETES MELLITUS WITHOUT COMPLICATION, WITHOUT LONG-TERM CURRENT USE OF INSULIN (HCC): ICD-10-CM

## 2023-01-19 DIAGNOSIS — M25.472 LEFT ANKLE SWELLING: Primary | ICD-10-CM

## 2023-01-19 DIAGNOSIS — L08.9 TOE INFECTION: ICD-10-CM

## 2023-01-19 RX ORDER — LORAZEPAM 0.5 MG/1
TABLET ORAL
COMMUNITY
Start: 2022-12-27

## 2023-01-19 RX ORDER — DICYCLOMINE HCL 20 MG
20 TABLET ORAL 2 TIMES DAILY PRN
Qty: 60 TABLET | Refills: 0 | Status: SHIPPED | OUTPATIENT
Start: 2023-01-19 | End: 2023-07-18

## 2023-01-19 ASSESSMENT — ENCOUNTER SYMPTOMS
COUGH: 0
ROS SKIN COMMENTS: SEE ABOVE
SHORTNESS OF BREATH: 0
ABDOMINAL PAIN: 0

## 2023-01-19 NOTE — PROGRESS NOTES
[address] [place]     23  Marciano Plunkett : 1945 Sex: female  Age: 76 y.o. Chief Complaint   Patient presents with    Ingrown Toenail     Left foot, 2nd digit toe       HPI  Patient presents today to express care accompanied by daughter complaining of some discomfort to her toe. States she has a crack at the tip of her left great toe that is bothering her. She is diabetic. Also states that her nails for her second and third toes are cutting into her other toes. She was wanting to have the nails clipped off of them. Also complaining of ankle edema which has been a little bit worse here in the last couple weeks. Denies any congestive heart failure type symptoms. Denies shortness of breath or chest pain. States she has been eating a lot more salt recently which certainly could account for the swelling. She is on a low-dose diuretic/furosemide 20 mg every other day. States she tried to get into see her podiatrist today but was unsuccessful. Wanted to see what we can do for her today. Review of Systems   Constitutional:  Negative for chills and fever. Respiratory:  Negative for cough and shortness of breath. Cardiovascular:  Positive for leg swelling. Negative for chest pain. Gastrointestinal:  Negative for abdominal pain. Genitourinary: Negative. Musculoskeletal:         Arthritis pain    Skin:         See above   Neurological:  Negative for numbness. REST OF PERTINENT ROS GONE OVER AND WAS NEGATIVE. Current Outpatient Medications:     LORazepam (ATIVAN) 0.5 MG tablet, , Disp: , Rfl:     mupirocin (BACTROBAN) 2 % ointment, Apply topically 3 times daily. , Disp: 15 g, Rfl: 0    zolpidem (AMBIEN) 5 MG tablet, take 1 tablet by mouth nightly, Disp: 30 tablet, Rfl: 2    HYDROcodone-acetaminophen (NORCO) 7.5-325 MG per tablet, Take 1 tablet by mouth 4 times daily for 30 days. , Disp: 120 tablet, Rfl: 0    dicyclomine (BENTYL) 20 MG tablet, Take 1 tablet by mouth 2 times daily as needed (ibs), Disp: 60 tablet, Rfl: 0    omeprazole (PRILOSEC) 40 MG delayed release capsule, Take 1 capsule by mouth daily, Disp: 90 capsule, Rfl: 1    fluconazole (DIFLUCAN) 100 MG tablet, 2 pills one time dose ,then 2 pills repeat in 5 days if not clear, Disp: 4 tablet, Rfl: 0    hydroxyurea (HYDREA) 500 MG chemo capsule, Take 1 capsule by mouth daily, Disp: 90 capsule, Rfl: 1    metoprolol succinate (TOPROL XL) 25 MG extended release tablet, Take 1 tablet by mouth daily, Disp: 30 tablet, Rfl: 5    triamcinolone (KENALOG) 0.025 % cream, Apply topically 2 times daily. , Disp: 1 each, Rfl: 2    folic acid (FOLVITE) 1 MG tablet, Take 1 tablet by mouth daily, Disp: 90 tablet, Rfl: 1    aspirin 325 MG EC tablet, Take 1 tablet by mouth 2 times daily, Disp: 30 tablet, Rfl: 3    furosemide (LASIX) 20 MG tablet, Take 1 tablet by mouth daily, Disp: 90 tablet, Rfl: 1    albuterol sulfate HFA (PROVENTIL HFA) 108 (90 Base) MCG/ACT inhaler, Inhale 2 puffs into the lungs every 6 hours as needed for Wheezing, Disp: 18 g, Rfl: 3    metoprolol tartrate (LOPRESSOR) 50 MG tablet, Take 1 tablet by mouth daily, Disp: 90 tablet, Rfl: 1    cholestyramine (QUESTRAN) 4 g packet, Take 1 packet by mouth daily, Disp: , Rfl:     Handicap Placard MISC, Permanent disability  5 years, Disp: 1 each, Rfl: 0    diclofenac sodium (VOLTAREN) 1 % GEL, Apply 2 g topically 2 times daily as needed, Disp: , Rfl:     Cholecalciferol (VITAMIN D3 PO), Take 1,000 Units by mouth daily, Disp: , Rfl:     Multiple Vitamins-Minerals (THERAPEUTIC MULTIVITAMIN-MINERALS) tablet, Take 1 tablet by mouth daily, Disp: , Rfl:     simvastatin (ZOCOR) 40 MG tablet, Take 1 tablet by mouth nightly, Disp: 90 tablet, Rfl: 1    citalopram (CELEXA) 40 MG tablet, Take 1 tablet by mouth daily (Patient taking differently: Take 20 mg by mouth daily), Disp: 90 tablet, Rfl: 1  Allergies   Allergen Reactions    Doxycycline Hyclate Nausea Only    Cataflam [Diclofenac]     Celebrex [Celecoxib]     Lamisil [Terbinafine] Swelling    Levaquin [Levofloxacin]     Lipitor [Atorvastatin Calcium]        Past Medical History:   Diagnosis Date    Arthritis     Crohn disease ([facility]     Diabetes mellitus ([facility]     Hyperlipidemia     Hypertension     Insomnia     [facility]     Ulcerative colitis with intestinal obstruction ([facility]     Unspecified diseases of blood and blood-forming organs      Past Surgical History:   Procedure Laterality Date    ADENOIDECTOMY      CARPAL TUNNEL RELEASE Right     CERVICAL FUSION      COLON SURGERY      COLOSTOMY      EYE SURGERY Bilateral     CATARACT    HERNIA REPAIR      HIP FRACTURE SURGERY Left 9/3/2022    HIP OPEN REDUCTION INTERNAL FIXATION WITH CEPHALOMEDULLARY NAIL performed by Maria Braswell MD at [address] [facility] [address]      rt and lt    JOINT REPLACEMENT Bilateral     JOINT REPLACEMENT Right     TONSILLECTOMY      TUBAL LIGATION       Family History   Problem Relation Age of Onset    Breast Cancer Mother     Diabetes Father     Heart Failure Sister     Diabetes Sister      Social History     Socioeconomic History    Marital status:       Spouse name: Not on file    Number of children: Not on file    Years of education: Not on file    Highest education level: Not on file   Occupational History    Not on file   Tobacco Use    Smoking status: Former     Packs/day: 1.00     Years: 45.00     Pack years: 45.00     Types: Cigarettes     Quit date:      Years since quittin.0    Smokeless tobacco: Never   Vaping Use    Vaping Use: Never used   Substance and Sexual Activity    Alcohol use: No    Drug use: No    Sexual activity: Not on file   Other Topics Concern    Not on file   Social History Narrative    Not on file     Social Determinants of Health     Financial Resource Strain: Low Risk     Difficulty of Paying Living Expenses: Not hard at all   Food Insecurity: No Food Insecurity Worried About Running Out of Food in the Last Year: Never true    Ran Out of Food in the Last Year: Never true   Transportation Needs: Not on file   Physical Activity: Inactive    Days of Exercise per Week: 0 days    Minutes of Exercise per Session: 0 min   Stress: Not on file   Social Connections: Not on file   Intimate Partner Violence: Not on file   Housing Stability: Not on file       Vitals:    01/19/23 1253   BP: 116/64   Pulse: 62   Temp: 97.2 °F (36.2 °C)   TempSrc: Temporal   SpO2: 100%   Weight: 132 lb (59.9 kg)   Height: 5' 4\" (1.626 m)       Physical Exam  Vitals and nursing note reviewed. Constitutional:       General: She is not in acute distress. Cardiovascular:      Rate and Rhythm: Normal rate and regular rhythm. Pulmonary:      Effort: Pulmonary effort is normal. No respiratory distress. Breath sounds: Normal breath sounds. No wheezing, rhonchi or rales. Musculoskeletal:         General: Swelling present. No tenderness. Skin:     General: Skin is warm and dry. Findings: Erythema present. Comments: She does have a cracked reddened area to her distal left great toe slightly tender to palpation. No drainage. The nail does not appear to be ingrown. She does have longer nails to her other toes on that foot looks to be jagged and cutting into the other toes. Neurological:      General: No focal deficit present. Mental Status: She is alert and oriented to person, place, and time. Psychiatric:         Mood and Affect: Mood normal.         Behavior: Behavior normal.         Thought Content: Thought content normal.         Judgment: Judgment normal.                 Assessment and Plan:  Robinson Rivas was seen today for ingrown toenail. Diagnoses and all orders for this visit:    Left ankle swelling    Toe infection    Other orders  -     mupirocin (BACTROBAN) 2 % ointment; Apply topically 3 times daily. Plan: Bactroban ointment to the great toe. Decrease salt intake. Compression stockings. Increase her Lasix frequency to daily for the next 4 days then back to every other day. Follow-up with podiatry and PCP. I did trim the nails for her on both feet. Notify us with problems in the interim. Return for fu pcp. Seen By:  Ace Daugherty MD      *Document was created using voice recognition software. Note was reviewed however may contain grammatical errors.

## 2023-01-23 DIAGNOSIS — G89.29 CHRONIC MIDLINE LOW BACK PAIN WITH LEFT-SIDED SCIATICA: ICD-10-CM

## 2023-01-23 DIAGNOSIS — M54.42 CHRONIC MIDLINE LOW BACK PAIN WITH LEFT-SIDED SCIATICA: ICD-10-CM

## 2023-01-23 RX ORDER — HYDROCODONE BITARTRATE AND ACETAMINOPHEN 7.5; 325 MG/1; MG/1
1 TABLET ORAL 4 TIMES DAILY
Qty: 120 TABLET | Refills: 0 | Status: SHIPPED | OUTPATIENT
Start: 2023-01-23 | End: 2023-02-22

## 2023-01-30 ENCOUNTER — OFFICE VISIT (OUTPATIENT)
Dept: FAMILY MEDICINE CLINIC | Age: 78
End: 2023-01-30

## 2023-01-30 VITALS
BODY MASS INDEX: 22.2 KG/M2 | SYSTOLIC BLOOD PRESSURE: 115 MMHG | DIASTOLIC BLOOD PRESSURE: 70 MMHG | TEMPERATURE: 97.9 F | HEIGHT: 64 IN | RESPIRATION RATE: 18 BRPM | WEIGHT: 130 LBS

## 2023-01-30 DIAGNOSIS — D64.9 FATIGUE ASSOCIATED WITH ANEMIA: ICD-10-CM

## 2023-01-30 DIAGNOSIS — R53.83 OTHER FATIGUE: ICD-10-CM

## 2023-01-30 DIAGNOSIS — Z79.4 TYPE 2 DIABETES MELLITUS WITH DIABETIC NEPHROPATHY, WITH LONG-TERM CURRENT USE OF INSULIN (HCC): ICD-10-CM

## 2023-01-30 DIAGNOSIS — E11.9 TYPE 2 DIABETES MELLITUS WITHOUT COMPLICATION, WITHOUT LONG-TERM CURRENT USE OF INSULIN (HCC): ICD-10-CM

## 2023-01-30 DIAGNOSIS — K50.119 CROHN'S COLITIS, UNSPECIFIED COMPLICATION (HCC): ICD-10-CM

## 2023-01-30 DIAGNOSIS — I25.119 CORONARY ARTERY DISEASE INVOLVING NATIVE CORONARY ARTERY WITH ANGINA PECTORIS, UNSPECIFIED WHETHER NATIVE OR TRANSPLANTED HEART (HCC): ICD-10-CM

## 2023-01-30 DIAGNOSIS — J41.1 BRONCHITIS, MUCOPURULENT RECURRENT (HCC): ICD-10-CM

## 2023-01-30 DIAGNOSIS — G62.9 NEUROPATHY: ICD-10-CM

## 2023-01-30 DIAGNOSIS — K50.90 CROHN'S DISEASE WITHOUT COMPLICATION, UNSPECIFIED GASTROINTESTINAL TRACT LOCATION (HCC): ICD-10-CM

## 2023-01-30 DIAGNOSIS — E11.21 TYPE 2 DIABETES MELLITUS WITH DIABETIC NEPHROPATHY, WITH LONG-TERM CURRENT USE OF INSULIN (HCC): ICD-10-CM

## 2023-01-30 DIAGNOSIS — D47.1 MYELOPROLIFERATIVE DISORDER (HCC): ICD-10-CM

## 2023-01-30 DIAGNOSIS — R42 LIGHTHEADED: ICD-10-CM

## 2023-01-30 DIAGNOSIS — E78.49 OTHER HYPERLIPIDEMIA: ICD-10-CM

## 2023-01-30 DIAGNOSIS — F41.9 ANXIETY: ICD-10-CM

## 2023-01-30 DIAGNOSIS — G89.29 CHRONIC MIDLINE LOW BACK PAIN WITH LEFT-SIDED SCIATICA: Primary | ICD-10-CM

## 2023-01-30 DIAGNOSIS — E43 SEVERE PROTEIN-CALORIE MALNUTRITION (HCC): ICD-10-CM

## 2023-01-30 DIAGNOSIS — Z43.2 ATTENTION TO ILEOSTOMY (HCC): ICD-10-CM

## 2023-01-30 DIAGNOSIS — M54.42 CHRONIC MIDLINE LOW BACK PAIN WITH LEFT-SIDED SCIATICA: Primary | ICD-10-CM

## 2023-01-30 DIAGNOSIS — N76.0 ACUTE VAGINITIS: ICD-10-CM

## 2023-01-30 DIAGNOSIS — F33.0 MAJOR DEPRESSIVE DISORDER, RECURRENT, MILD (HCC): ICD-10-CM

## 2023-01-30 RX ORDER — MIRTAZAPINE 15 MG/1
15 TABLET, FILM COATED ORAL NIGHTLY
Qty: 30 TABLET | Refills: 5 | Status: SHIPPED | OUTPATIENT
Start: 2023-01-30

## 2023-01-30 ASSESSMENT — PATIENT HEALTH QUESTIONNAIRE - PHQ9
2. FEELING DOWN, DEPRESSED OR HOPELESS: 2
8. MOVING OR SPEAKING SO SLOWLY THAT OTHER PEOPLE COULD HAVE NOTICED. OR THE OPPOSITE, BEING SO FIGETY OR RESTLESS THAT YOU HAVE BEEN MOVING AROUND A LOT MORE THAN USUAL: 0
7. TROUBLE CONCENTRATING ON THINGS, SUCH AS READING THE NEWSPAPER OR WATCHING TELEVISION: 0
SUM OF ALL RESPONSES TO PHQ9 QUESTIONS 1 & 2: 2
SUM OF ALL RESPONSES TO PHQ QUESTIONS 1-9: 4
3. TROUBLE FALLING OR STAYING ASLEEP: 2
1. LITTLE INTEREST OR PLEASURE IN DOING THINGS: 0
5. POOR APPETITE OR OVEREATING: 0
SUM OF ALL RESPONSES TO PHQ QUESTIONS 1-9: 4
9. THOUGHTS THAT YOU WOULD BE BETTER OFF DEAD, OR OF HURTING YOURSELF: 0
SUM OF ALL RESPONSES TO PHQ QUESTIONS 1-9: 4
SUM OF ALL RESPONSES TO PHQ QUESTIONS 1-9: 4
4. FEELING TIRED OR HAVING LITTLE ENERGY: 0
6. FEELING BAD ABOUT YOURSELF - OR THAT YOU ARE A FAILURE OR HAVE LET YOURSELF OR YOUR FAMILY DOWN: 0
10. IF YOU CHECKED OFF ANY PROBLEMS, HOW DIFFICULT HAVE THESE PROBLEMS MADE IT FOR YOU TO DO YOUR WORK, TAKE CARE OF THINGS AT HOME, OR GET ALONG WITH OTHER PEOPLE: 0

## 2023-01-30 NOTE — PROGRESS NOTES
23  Lauren Singh : 1945 Sex: female  Age: 76 y.o. Chief Complaint   Patient presents with    Hypertension     Check up on her blood pressure meds    Medication Check     Wants to talk about her Ativan bumming it up, also wants to talk her voltarn gel ordered but that ios no longer an ordered med  needs something else        HPI  overall checkup  Going over her blood pressure medicines that she is taken  Ativan not working as it supposed to    Review of Systems   Constitutional:  Negative for appetite change, chills, diaphoresis, fatigue, fever and unexpected weight change. HENT:  Negative for congestion, dental problem, ear discharge, ear pain, facial swelling, hearing loss, mouth sores, rhinorrhea, sinus pain, sore throat, tinnitus and voice change. Eyes:  Negative for photophobia and visual disturbance (no visual changes). Respiratory:  Positive for wheezing (on occasion   with a deep breath). Negative for apnea, cough, chest tightness and shortness of breath. Cardiovascular:  Negative for chest pain and palpitations. Gastrointestinal:  Positive for abdominal pain. Negative for abdominal distention, constipation, diarrhea, nausea, rectal pain and vomiting. Following with Dr Charanjit Crowe    Endocrine: Negative for cold intolerance, heat intolerance and polyuria. Genitourinary:  Positive for urgency. Negative for dysuria, frequency and hematuria. Vaginal problems    Musculoskeletal:  Positive for arthralgias (recent fractured hip was in lucas now out), back pain and gait problem. Negative for joint swelling, myalgias, neck pain and neck stiffness. Skin:  Negative for rash and wound. Allergic/Immunologic: Negative for environmental allergies. Neurological:  Negative for dizziness, tremors, seizures, syncope, facial asymmetry, speech difficulty, weakness, light-headedness, numbness and headaches. Hematological:  Does not bruise/bleed easily. Psychiatric/Behavioral:  Negative for agitation, hallucinations and suicidal ideas. The patient is not nervous/anxious. Physical Exam  Vitals reviewed. Constitutional:       General: She is not in acute distress. Appearance: She is ill-appearing. HENT:      Head: Normocephalic. Right Ear: Tympanic membrane, ear canal and external ear normal. There is no impacted cerumen. Left Ear: Tympanic membrane, ear canal and external ear normal. There is no impacted cerumen. Nose: No rhinorrhea. Mouth/Throat:      Pharynx: No posterior oropharyngeal erythema. Eyes:      General:         Right eye: No discharge. Left eye: No discharge. Extraocular Movements: Extraocular movements intact. Pupils: Pupils are equal, round, and reactive to light. Neck:      Vascular: No carotid bruit. Cardiovascular:      Rate and Rhythm: Regular rhythm. Heart sounds: Murmur (1/6 murmur ) heard. No gallop. Pulmonary:      Breath sounds: Rhonchi present. No rales. Abdominal:      Palpations: Abdomen is soft. There is no mass. Tenderness: There is no abdominal tenderness. There is no guarding or rebound. Hernia: No hernia is present. Comments: Ostomy    Musculoskeletal:         General: Normal range of motion. Cervical back: Neck supple. Comments: Back pain and tenderness in the low back and sacral area   Lymphadenopathy:      Cervical: No cervical adenopathy. Skin:     General: Skin is warm and dry. Findings: Rash (neck area) present. No bruising. Neurological:      General: No focal deficit present. Mental Status: She is alert and oriented to person, place, and time. Cranial Nerves: No cranial nerve deficit. Motor: Weakness present.       Coordination: Coordination abnormal.      Gait: Gait abnormal.   Psychiatric:         Mood and Affect: Mood normal.         Behavior: Behavior normal.       Assessment and Plan:  Taylor Martinez was seen today for hypertension and medication check. Diagnoses and all orders for this visit:    Chronic midline low back pain with left-sided sciatica  -     CBC with Auto Differential; Future  -     Comprehensive Metabolic Panel; Future  -     Hemoglobin A1C; Future  -     Lipid Panel; Future  -     T4, Free; Future  -     TSH; Future    Type 2 diabetes mellitus without complication, without long-term current use of insulin (HCC)    Anxiety  -     CBC with Auto Differential; Future  -     Comprehensive Metabolic Panel; Future  -     Hemoglobin A1C; Future  -     Lipid Panel; Future  -     T4, Free; Future  -     TSH; Future    Crohn's colitis, unspecified complication ([facility])    Fatigue associated with anemia  -     CBC with Auto Differential; Future  -     Comprehensive Metabolic Panel; Future  -     Hemoglobin A1C; Future  -     Lipid Panel; Future  -     T4, Free; Future  -     TSH; Future    Crohn's disease without complication, unspecified gastrointestinal tract location ([facility]  -     CBC with Auto Differential; Future  -     Comprehensive Metabolic Panel; Future  -     Hemoglobin A1C; Future  -     Lipid Panel; Future  -     T4, Free; Future  -     TSH; Future    Type 2 diabetes mellitus with diabetic nephropathy, with long-term current use of insulin (HCC)  -     CBC with Auto Differential; Future  -     Comprehensive Metabolic Panel; Future  -     Hemoglobin A1C; Future  -     Lipid Panel; Future  -     T4, Free; Future  -     TSH; Future    Other hyperlipidemia  -     CBC with Auto Differential; Future  -     Comprehensive Metabolic Panel; Future  -     Hemoglobin A1C; Future  -     Lipid Panel; Future  -     T4, Free; Future  -     TSH; Future    Myeloproliferative disorder ([facility]  -     CBC with Auto Differential; Future  -     Comprehensive Metabolic Panel; Future  -     Hemoglobin A1C; Future  -     Lipid Panel; Future  -     T4, Free; Future  -     TSH;  Future    Lightheaded  -     CBC with Auto Differential; Future  -     Comprehensive Metabolic Panel; Future  -     Hemoglobin A1C; Future  -     Lipid Panel; Future  -     T4, Free; Future  -     TSH; Future    Acute vaginitis    Attention to ileostomy (HCC)    Severe protein-calorie malnutrition (HCC)    Bronchitis, mucopurulent recurrent (HCC)    Major depressive disorder, recurrent, mild (HCC)    Coronary artery disease involving native coronary artery with angina pectoris, unspecified whether native or transplanted heart ([facility]    Other orders  -     Influenza, FLUAD, (age 72 y+), IM, PF, 0.5 mL  -     mirtazapine (REMERON) 15 MG tablet; Take 1 tablet by mouth nightly        Discussions/Education provided to patients during visit:  [] Discussed the importance to stop smoking. [] Advised to monitor eating habits. [] Reviewed and discussed Imaging results. [] Reviewed and discussed Lab results. [] Discussed the importance of drinking plenty of fluids. [] Cut down on Salt, Caffeine, and Sugar.  [] Non Compliant Patient   [x] Communicated with patient any concerns, to phone office   Meals on wheels ,[facility] will arrange meals   Not on citalapram   Remeron 15 mg one per nightt   Working to try to do a follow-up Meals on Wheels with Isamar Bourne as I mentioned above she has [address] and that is in assisted living and the idea is she does make meals that she takes to people like a Meals on Wheels  Continue the same meds otherwise we discussed those were adding the Remeron as we mentioned above  Labs due to be done as soon as she can  Still having problems with her colostomy and ostomy situation and she is following with specified people for that,        No follow-ups on file.       Seen by:     Nasreen Thompson, DO

## 2023-02-02 ENCOUNTER — TELEPHONE (OUTPATIENT)
Dept: FAMILY MEDICINE CLINIC | Age: 78
End: 2023-02-02

## 2023-02-02 NOTE — TELEPHONE ENCOUNTER
Saw Dr. Giles Magdaleno Monday, was given Antonio Never- she remembers now ,she cannot take that. It makes her feel like she has the flu. Can Dr. Giels Magdaleno  try something else? AND she got the flu vaccine Monday and now feels like she has the flu- weak, just doesn't feel good. Does Dr. Giles Magdaleno want to give her anything? Pharmacy is [facility] Margie Galo will check her drug store after 5 to see if any rx was sent.

## 2023-02-06 ENCOUNTER — TELEPHONE (OUTPATIENT)
Dept: FAMILY MEDICINE CLINIC | Age: 78
End: 2023-02-06

## 2023-02-06 NOTE — TELEPHONE ENCOUNTER
Patient called states she was in office last week ,she now has canker sores on roof of mouth and would like something for that called into [facility].  Can not come into office nor do a Virtual at this time

## 2023-02-10 ENCOUNTER — TELEPHONE (OUTPATIENT)
Dept: FAMILY MEDICINE CLINIC | Age: 78
End: 2023-02-10

## 2023-02-10 DIAGNOSIS — F41.9 ANXIETY: Primary | ICD-10-CM

## 2023-02-10 RX ORDER — LORAZEPAM 0.5 MG/1
0.5 TABLET ORAL 2 TIMES DAILY
Qty: 60 TABLET | Refills: 0 | Status: SHIPPED | OUTPATIENT
Start: 2023-02-10 | End: 2023-03-12

## 2023-02-10 RX ORDER — METOPROLOL SUCCINATE 25 MG/1
25 TABLET, EXTENDED RELEASE ORAL DAILY
Qty: 30 TABLET | Refills: 5 | Status: SHIPPED | OUTPATIENT
Start: 2023-02-10

## 2023-02-10 NOTE — TELEPHONE ENCOUNTER
Refill  BUT wants med dose increased on her Lorazepam because she is more depressed. Presently takes Lorazepam 0.5 mg one daily. [facility] .

## 2023-02-20 ENCOUNTER — TELEPHONE (OUTPATIENT)
Dept: FAMILY MEDICINE CLINIC | Age: 78
End: 2023-02-20

## 2023-02-20 DIAGNOSIS — M54.42 CHRONIC MIDLINE LOW BACK PAIN WITH LEFT-SIDED SCIATICA: ICD-10-CM

## 2023-02-20 DIAGNOSIS — G89.29 CHRONIC MIDLINE LOW BACK PAIN WITH LEFT-SIDED SCIATICA: ICD-10-CM

## 2023-02-20 RX ORDER — OMEPRAZOLE 40 MG/1
40 CAPSULE, DELAYED RELEASE ORAL DAILY
Qty: 90 CAPSULE | Refills: 1 | Status: SHIPPED | OUTPATIENT
Start: 2023-02-20 | End: 2023-08-19

## 2023-02-20 RX ORDER — HYDROXYUREA 500 MG/1
500 CAPSULE ORAL DAILY
Qty: 90 CAPSULE | Refills: 1 | Status: SHIPPED | OUTPATIENT
Start: 2023-02-20 | End: 2023-08-19

## 2023-02-20 RX ORDER — DICYCLOMINE HCL 20 MG
20 TABLET ORAL 2 TIMES DAILY PRN
Qty: 60 TABLET | Refills: 0 | Status: SHIPPED | OUTPATIENT
Start: 2023-02-20 | End: 2023-08-19

## 2023-02-20 RX ORDER — HYDROCODONE BITARTRATE AND ACETAMINOPHEN 7.5; 325 MG/1; MG/1
1 TABLET ORAL 4 TIMES DAILY
Qty: 120 TABLET | Refills: 0 | Status: SHIPPED | OUTPATIENT
Start: 2023-02-20 | End: 2023-03-22

## 2023-02-25 ASSESSMENT — ENCOUNTER SYMPTOMS
RHINORRHEA: 0
WHEEZING: 1
SHORTNESS OF BREATH: 0
BACK PAIN: 1
FACIAL SWELLING: 0
VOMITING: 0
ABDOMINAL DISTENTION: 0
APNEA: 0
ABDOMINAL PAIN: 1
NAUSEA: 0
PHOTOPHOBIA: 0
VOICE CHANGE: 0
SORE THROAT: 0
CONSTIPATION: 0
DIARRHEA: 0
RECTAL PAIN: 0
SINUS PAIN: 0
COUGH: 0
CHEST TIGHTNESS: 0

## 2023-03-14 RX ORDER — DICYCLOMINE HCL 20 MG
20 TABLET ORAL 2 TIMES DAILY PRN
Qty: 60 TABLET | Refills: 1 | Status: SHIPPED | OUTPATIENT
Start: 2023-03-14 | End: 2023-09-10

## 2023-03-17 DIAGNOSIS — F41.9 ANXIETY: ICD-10-CM

## 2023-03-17 DIAGNOSIS — G89.29 CHRONIC MIDLINE LOW BACK PAIN WITH LEFT-SIDED SCIATICA: ICD-10-CM

## 2023-03-17 DIAGNOSIS — M54.42 CHRONIC MIDLINE LOW BACK PAIN WITH LEFT-SIDED SCIATICA: ICD-10-CM

## 2023-03-17 RX ORDER — LORAZEPAM 0.5 MG/1
0.5 TABLET ORAL 2 TIMES DAILY
Qty: 60 TABLET | Refills: 0 | Status: SHIPPED | OUTPATIENT
Start: 2023-03-17 | End: 2023-04-16

## 2023-03-17 RX ORDER — HYDROCODONE BITARTRATE AND ACETAMINOPHEN 7.5; 325 MG/1; MG/1
1 TABLET ORAL 4 TIMES DAILY
Qty: 120 TABLET | Refills: 0 | Status: SHIPPED | OUTPATIENT
Start: 2023-03-17 | End: 2023-04-16

## 2023-03-28 DIAGNOSIS — F41.9 ANXIETY: ICD-10-CM

## 2023-03-28 DIAGNOSIS — F51.01 PRIMARY INSOMNIA: ICD-10-CM

## 2023-03-28 RX ORDER — SIMVASTATIN 40 MG
40 TABLET ORAL NIGHTLY
Qty: 90 TABLET | Refills: 1 | Status: SHIPPED | OUTPATIENT
Start: 2023-03-28 | End: 2023-06-26

## 2023-03-28 RX ORDER — LORAZEPAM 0.5 MG/1
TABLET ORAL
Qty: 60 TABLET | Refills: 0 | OUTPATIENT
Start: 2023-03-28

## 2023-03-28 RX ORDER — ZOLPIDEM TARTRATE 5 MG/1
TABLET ORAL
Qty: 30 TABLET | Refills: 2 | Status: SHIPPED | OUTPATIENT
Start: 2023-03-28 | End: 2023-06-20

## 2023-04-03 RX ORDER — METOPROLOL SUCCINATE 25 MG/1
25 TABLET, EXTENDED RELEASE ORAL DAILY
Qty: 30 TABLET | Refills: 5 | Status: SHIPPED | OUTPATIENT
Start: 2023-04-03

## 2023-04-03 NOTE — TELEPHONE ENCOUNTER
Patients last appointment 1/30/2023.   Patients next scheduled appointment   Future Appointments   Date Time Provider Brent Rueda   7/31/2023  1:00 PM DO [facility]

## 2023-05-01 ENCOUNTER — OFFICE VISIT (OUTPATIENT)
Dept: FAMILY MEDICINE CLINIC | Age: 78
End: 2023-05-01
Payer: MEDICARE

## 2023-05-01 VITALS
RESPIRATION RATE: 17 BRPM | DIASTOLIC BLOOD PRESSURE: 72 MMHG | BODY MASS INDEX: 22.28 KG/M2 | TEMPERATURE: 97.5 F | WEIGHT: 129.8 LBS | SYSTOLIC BLOOD PRESSURE: 116 MMHG | HEART RATE: 96 BPM | OXYGEN SATURATION: 100 %

## 2023-05-01 DIAGNOSIS — I25.119 CORONARY ARTERY DISEASE INVOLVING NATIVE CORONARY ARTERY WITH ANGINA PECTORIS, UNSPECIFIED WHETHER NATIVE OR TRANSPLANTED HEART (HCC): ICD-10-CM

## 2023-05-01 DIAGNOSIS — J32.9 RECURRENT SINUSITIS: ICD-10-CM

## 2023-05-01 DIAGNOSIS — Z79.4 TYPE 2 DIABETES MELLITUS WITH DIABETIC NEPHROPATHY, WITH LONG-TERM CURRENT USE OF INSULIN (HCC): ICD-10-CM

## 2023-05-01 DIAGNOSIS — E11.21 TYPE 2 DIABETES MELLITUS WITH DIABETIC NEPHROPATHY, WITH LONG-TERM CURRENT USE OF INSULIN (HCC): ICD-10-CM

## 2023-05-01 DIAGNOSIS — K50.90 CROHN'S DISEASE WITHOUT COMPLICATION, UNSPECIFIED GASTROINTESTINAL TRACT LOCATION (HCC): ICD-10-CM

## 2023-05-01 DIAGNOSIS — E78.49 OTHER HYPERLIPIDEMIA: ICD-10-CM

## 2023-05-01 DIAGNOSIS — S72.002S CLOSED FRACTURE OF LEFT HIP, SEQUELA: ICD-10-CM

## 2023-05-01 DIAGNOSIS — R05.3 CHRONIC COUGH: ICD-10-CM

## 2023-05-01 DIAGNOSIS — F51.01 PRIMARY INSOMNIA: ICD-10-CM

## 2023-05-01 DIAGNOSIS — L30.9 DERMATITIS: ICD-10-CM

## 2023-05-01 DIAGNOSIS — K50.119 CROHN'S COLITIS, UNSPECIFIED COMPLICATION (HCC): ICD-10-CM

## 2023-05-01 DIAGNOSIS — M81.0 OSTEOPOROSIS, UNSPECIFIED OSTEOPOROSIS TYPE, UNSPECIFIED PATHOLOGICAL FRACTURE PRESENCE: Primary | ICD-10-CM

## 2023-05-01 DIAGNOSIS — D47.1 MYELOPROLIFERATIVE DISORDER (HCC): ICD-10-CM

## 2023-05-01 DIAGNOSIS — K62.4 ANAL STRICTURE: ICD-10-CM

## 2023-05-01 DIAGNOSIS — G89.29 CHRONIC MIDLINE LOW BACK PAIN WITH LEFT-SIDED SCIATICA: ICD-10-CM

## 2023-05-01 DIAGNOSIS — M54.42 CHRONIC MIDLINE LOW BACK PAIN WITH LEFT-SIDED SCIATICA: ICD-10-CM

## 2023-05-01 DIAGNOSIS — F33.1 MAJOR DEPRESSIVE DISORDER, RECURRENT, MODERATE (HCC): ICD-10-CM

## 2023-05-01 DIAGNOSIS — R53.83 OTHER FATIGUE: ICD-10-CM

## 2023-05-01 DIAGNOSIS — R06.00 DYSPNEA, UNSPECIFIED TYPE: ICD-10-CM

## 2023-05-01 PROCEDURE — 1036F TOBACCO NON-USER: CPT | Performed by: FAMILY MEDICINE

## 2023-05-01 PROCEDURE — G8400 PT W/DXA NO RESULTS DOC: HCPCS | Performed by: FAMILY MEDICINE

## 2023-05-01 PROCEDURE — 99214 OFFICE O/P EST MOD 30 MIN: CPT | Performed by: FAMILY MEDICINE

## 2023-05-01 PROCEDURE — 3078F DIAST BP <80 MM HG: CPT | Performed by: FAMILY MEDICINE

## 2023-05-01 PROCEDURE — 1123F ACP DISCUSS/DSCN MKR DOCD: CPT | Performed by: FAMILY MEDICINE

## 2023-05-01 PROCEDURE — 1090F PRES/ABSN URINE INCON ASSESS: CPT | Performed by: FAMILY MEDICINE

## 2023-05-01 PROCEDURE — G8427 DOCREV CUR MEDS BY ELIG CLIN: HCPCS | Performed by: FAMILY MEDICINE

## 2023-05-01 PROCEDURE — 3074F SYST BP LT 130 MM HG: CPT | Performed by: FAMILY MEDICINE

## 2023-05-01 PROCEDURE — G8420 CALC BMI NORM PARAMETERS: HCPCS | Performed by: FAMILY MEDICINE

## 2023-05-01 RX ORDER — CITALOPRAM 20 MG/1
20 TABLET ORAL DAILY
Qty: 90 TABLET | Refills: 1 | Status: SHIPPED | OUTPATIENT
Start: 2023-05-01

## 2023-05-01 RX ORDER — TRIAMCINOLONE ACETONIDE 0.25 MG/G
CREAM TOPICAL
Qty: 15 G | Refills: 1 | Status: SHIPPED | OUTPATIENT
Start: 2023-05-01

## 2023-05-01 SDOH — ECONOMIC STABILITY: FOOD INSECURITY: WITHIN THE PAST 12 MONTHS, YOU WORRIED THAT YOUR FOOD WOULD RUN OUT BEFORE YOU GOT MONEY TO BUY MORE.: NEVER TRUE

## 2023-05-01 SDOH — ECONOMIC STABILITY: HOUSING INSECURITY
IN THE LAST 12 MONTHS, WAS THERE A TIME WHEN YOU DID NOT HAVE A STEADY PLACE TO SLEEP OR SLEPT IN A SHELTER (INCLUDING NOW)?: NO

## 2023-05-01 SDOH — ECONOMIC STABILITY: FOOD INSECURITY: WITHIN THE PAST 12 MONTHS, THE FOOD YOU BOUGHT JUST DIDN'T LAST AND YOU DIDN'T HAVE MONEY TO GET MORE.: NEVER TRUE

## 2023-05-01 SDOH — ECONOMIC STABILITY: INCOME INSECURITY: HOW HARD IS IT FOR YOU TO PAY FOR THE VERY BASICS LIKE FOOD, HOUSING, MEDICAL CARE, AND HEATING?: NOT HARD AT ALL

## 2023-05-01 ASSESSMENT — PATIENT HEALTH QUESTIONNAIRE - PHQ9
1. LITTLE INTEREST OR PLEASURE IN DOING THINGS: 0
2. FEELING DOWN, DEPRESSED OR HOPELESS: 1
2. FEELING DOWN, DEPRESSED OR HOPELESS: 1
SUM OF ALL RESPONSES TO PHQ QUESTIONS 1-9: 1
7. TROUBLE CONCENTRATING ON THINGS, SUCH AS READING THE NEWSPAPER OR WATCHING TELEVISION: 0
4. FEELING TIRED OR HAVING LITTLE ENERGY: 0
SUM OF ALL RESPONSES TO PHQ QUESTIONS 1-9: 1
SUM OF ALL RESPONSES TO PHQ QUESTIONS 1-9: 1
3. TROUBLE FALLING OR STAYING ASLEEP: 0
SUM OF ALL RESPONSES TO PHQ QUESTIONS 1-9: 1
5. POOR APPETITE OR OVEREATING: 0
8. MOVING OR SPEAKING SO SLOWLY THAT OTHER PEOPLE COULD HAVE NOTICED. OR THE OPPOSITE, BEING SO FIGETY OR RESTLESS THAT YOU HAVE BEEN MOVING AROUND A LOT MORE THAN USUAL: 0
6. FEELING BAD ABOUT YOURSELF - OR THAT YOU ARE A FAILURE OR HAVE LET YOURSELF OR YOUR FAMILY DOWN: 0
SUM OF ALL RESPONSES TO PHQ QUESTIONS 1-9: 1
SUM OF ALL RESPONSES TO PHQ QUESTIONS 1-9: 1
1. LITTLE INTEREST OR PLEASURE IN DOING THINGS: 0
SUM OF ALL RESPONSES TO PHQ QUESTIONS 1-9: 1
SUM OF ALL RESPONSES TO PHQ9 QUESTIONS 1 & 2: 1
SUM OF ALL RESPONSES TO PHQ9 QUESTIONS 1 & 2: 1
SUM OF ALL RESPONSES TO PHQ QUESTIONS 1-9: 1
9. THOUGHTS THAT YOU WOULD BE BETTER OFF DEAD, OR OF HURTING YOURSELF: 0

## 2023-05-01 ASSESSMENT — ANXIETY QUESTIONNAIRES
6. BECOMING EASILY ANNOYED OR IRRITABLE: 0
GAD7 TOTAL SCORE: 5
3. WORRYING TOO MUCH ABOUT DIFFERENT THINGS: 3
1. FEELING NERVOUS, ANXIOUS, OR ON EDGE: 1
5. BEING SO RESTLESS THAT IT IS HARD TO SIT STILL: 0
IF YOU CHECKED OFF ANY PROBLEMS ON THIS QUESTIONNAIRE, HOW DIFFICULT HAVE THESE PROBLEMS MADE IT FOR YOU TO DO YOUR WORK, TAKE CARE OF THINGS AT HOME, OR GET ALONG WITH OTHER PEOPLE: NOT DIFFICULT AT ALL
4. TROUBLE RELAXING: 1
2. NOT BEING ABLE TO STOP OR CONTROL WORRYING: 0
7. FEELING AFRAID AS IF SOMETHING AWFUL MIGHT HAPPEN: 0

## 2023-05-10 RX ORDER — DICYCLOMINE HCL 20 MG
20 TABLET ORAL 2 TIMES DAILY PRN
Qty: 60 TABLET | Refills: 1 | Status: SHIPPED | OUTPATIENT
Start: 2023-05-10 | End: 2023-11-06

## 2023-05-10 NOTE — TELEPHONE ENCOUNTER
----- Message from Colmesneil Ximena sent at 5/10/2023  1:03 PM EDT -----  Subject: Refill Request    QUESTIONS  Name of Medication? dicyclomine (BENTYL) 20 MG tablet  Patient-reported dosage and instructions? Take 1 tablet by mouth 2 times   daily as needed (ibs)  How many days do you have left? 3  Preferred Pharmacy? [address] phone number (if available)? [phone]  ---------------------------------------------------------------------------  --------------  CALL BACK INFO  What is the best way for the office to contact you? OK to leave message on   voicemail  Preferred Call Back Phone Number? [phone]  ---------------------------------------------------------------------------  --------------  SCRIPT ANSWERS  Relationship to Patient?  Self

## 2023-05-11 DIAGNOSIS — M54.42 CHRONIC MIDLINE LOW BACK PAIN WITH LEFT-SIDED SCIATICA: ICD-10-CM

## 2023-05-11 DIAGNOSIS — G89.29 CHRONIC MIDLINE LOW BACK PAIN WITH LEFT-SIDED SCIATICA: ICD-10-CM

## 2023-05-11 RX ORDER — HYDROCODONE BITARTRATE AND ACETAMINOPHEN 7.5; 325 MG/1; MG/1
1 TABLET ORAL 4 TIMES DAILY
Qty: 120 TABLET | Refills: 0 | Status: SHIPPED | OUTPATIENT
Start: 2023-05-11 | End: 2023-06-10

## 2023-05-12 RX ORDER — DICYCLOMINE HCL 20 MG
20 TABLET ORAL 2 TIMES DAILY PRN
Qty: 60 TABLET | Refills: 1 | OUTPATIENT
Start: 2023-05-12 | End: 2023-11-08

## 2023-05-22 ASSESSMENT — ENCOUNTER SYMPTOMS
CHEST TIGHTNESS: 0
VOICE CHANGE: 0
FACIAL SWELLING: 0
SINUS PAIN: 0
SHORTNESS OF BREATH: 0
DIARRHEA: 0
CONSTIPATION: 0
COUGH: 0
WHEEZING: 1
BACK PAIN: 1
VOMITING: 0
RECTAL PAIN: 0
ABDOMINAL DISTENTION: 0
PHOTOPHOBIA: 0
SORE THROAT: 0
ABDOMINAL PAIN: 1
RHINORRHEA: 0
APNEA: 0
NAUSEA: 0

## 2023-05-26 RX ORDER — METOPROLOL TARTRATE 50 MG/1
50 TABLET, FILM COATED ORAL DAILY
Qty: 90 TABLET | Refills: 1 | Status: SHIPPED | OUTPATIENT
Start: 2023-05-26

## 2023-06-01 DIAGNOSIS — F41.9 ANXIETY: Primary | ICD-10-CM

## 2023-06-01 RX ORDER — LORAZEPAM 0.5 MG/1
1 TABLET ORAL NIGHTLY
Qty: 60 TABLET | Refills: 2 | Status: SHIPPED | OUTPATIENT
Start: 2023-06-01 | End: 2023-07-01

## 2023-06-08 DIAGNOSIS — M54.42 CHRONIC MIDLINE LOW BACK PAIN WITH LEFT-SIDED SCIATICA: ICD-10-CM

## 2023-06-08 DIAGNOSIS — G89.29 CHRONIC MIDLINE LOW BACK PAIN WITH LEFT-SIDED SCIATICA: ICD-10-CM

## 2023-06-08 RX ORDER — HYDROCODONE BITARTRATE AND ACETAMINOPHEN 7.5; 325 MG/1; MG/1
1 TABLET ORAL 4 TIMES DAILY
Qty: 120 TABLET | Refills: 0 | Status: SHIPPED | OUTPATIENT
Start: 2023-06-08 | End: 2023-07-08

## 2023-06-08 RX ORDER — HYDROXYUREA 500 MG/1
500 CAPSULE ORAL DAILY
Qty: 90 CAPSULE | Refills: 1 | Status: SHIPPED | OUTPATIENT
Start: 2023-06-08 | End: 2023-12-05

## 2023-06-08 RX ORDER — OMEPRAZOLE 40 MG/1
40 CAPSULE, DELAYED RELEASE ORAL DAILY
Qty: 90 CAPSULE | Refills: 1 | Status: SHIPPED | OUTPATIENT
Start: 2023-06-08 | End: 2023-12-05

## 2023-06-20 RX ORDER — FOLIC ACID 1 MG/1
1 TABLET ORAL DAILY
Qty: 90 TABLET | Refills: 1 | Status: SHIPPED | OUTPATIENT
Start: 2023-06-20 | End: 2023-12-17

## 2023-06-20 NOTE — TELEPHONE ENCOUNTER
----- Message from [address] sent at 6/20/2023 11:14 AM EDT -----  Subject: Refill Request    QUESTIONS  Name of Medication? folic acid (FOLVITE) 1 MG tablet  Patient-reported dosage and instructions? 1 mg. Take 1 tablet daily  How many days do you have left? 3  Preferred Pharmacy? [address] phone number (if available)? [phone]  Additional Information for Provider? 90 day supply. Upcoming appt 6/26.  ---------------------------------------------------------------------------  --------------  CALL BACK INFO  What is the best way for the office to contact you? OK to leave message on   voicemail  Preferred Call Back Phone Number? [phone]  ---------------------------------------------------------------------------  --------------  SCRIPT ANSWERS  Relationship to Patient?  Self

## 2023-06-26 DIAGNOSIS — F51.01 PRIMARY INSOMNIA: ICD-10-CM

## 2023-06-26 RX ORDER — ZOLPIDEM TARTRATE 5 MG/1
TABLET ORAL
Qty: 30 TABLET | Refills: 2 | Status: SHIPPED | OUTPATIENT
Start: 2023-06-26 | End: 2023-09-18

## 2023-07-05 DIAGNOSIS — G89.29 CHRONIC MIDLINE LOW BACK PAIN WITH LEFT-SIDED SCIATICA: ICD-10-CM

## 2023-07-05 DIAGNOSIS — M54.42 CHRONIC MIDLINE LOW BACK PAIN WITH LEFT-SIDED SCIATICA: ICD-10-CM

## 2023-07-05 RX ORDER — DICYCLOMINE HCL 20 MG
20 TABLET ORAL 2 TIMES DAILY PRN
Qty: 60 TABLET | Refills: 1 | Status: SHIPPED | OUTPATIENT
Start: 2023-07-05 | End: 2024-01-01

## 2023-07-05 RX ORDER — HYDROCODONE BITARTRATE AND ACETAMINOPHEN 7.5; 325 MG/1; MG/1
1 TABLET ORAL 4 TIMES DAILY
Qty: 120 TABLET | Refills: 0 | Status: SHIPPED | OUTPATIENT
Start: 2023-07-05 | End: 2023-08-04

## 2023-07-05 RX ORDER — SIMVASTATIN 40 MG
40 TABLET ORAL NIGHTLY
Qty: 90 TABLET | Refills: 0 | Status: SHIPPED | OUTPATIENT
Start: 2023-07-05 | End: 2023-10-03

## 2023-07-05 RX ORDER — SIMVASTATIN 40 MG
40 TABLET ORAL NIGHTLY
Qty: 90 TABLET | Refills: 0 | Status: CANCELLED | OUTPATIENT
Start: 2023-07-05 | End: 2023-10-03

## 2023-07-05 NOTE — TELEPHONE ENCOUNTER
----- Message from General Dragon sent at 7/5/2023 11:31 AM EDT -----  Subject: Refill Request    QUESTIONS  Name of Medication? HYDROcodone-acetaminophen (NORCO) 7.5-325 MG per   tablet  Patient-reported dosage and instructions? 1 tablet by mouth 4 times daily   How many days do you have left? 4  Preferred Pharmacy? [address] phone number (if available)? [phone]  Additional Information for Provider? 120 tablets dispensed   ---------------------------------------------------------------------------  --------------,  Name of Medication? dicyclomine (BENTYL) 20 MG tablet  Patient-reported dosage and instructions? 1 tablet by mouth 2 times per   day as needed   How many days do you have left? 2  Preferred Pharmacy? [address] phone number (if available)? [phone]  Additional Information for Provider? 60 tablets dispensed   ---------------------------------------------------------------------------  --------------  CALL BACK INFO  What is the best way for the office to contact you? OK to leave message on   voicemail  Preferred Call Back Phone Number? [phone]  ---------------------------------------------------------------------------  --------------  SCRIPT ANSWERS  Relationship to Patient?  Self

## 2023-07-10 ENCOUNTER — OFFICE VISIT (OUTPATIENT)
Dept: FAMILY MEDICINE CLINIC | Age: 78
End: 2023-07-10

## 2023-07-10 VITALS
SYSTOLIC BLOOD PRESSURE: 110 MMHG | WEIGHT: 126.8 LBS | OXYGEN SATURATION: 96 % | TEMPERATURE: 97.7 F | RESPIRATION RATE: 17 BRPM | HEART RATE: 65 BPM | BODY MASS INDEX: 21.77 KG/M2 | DIASTOLIC BLOOD PRESSURE: 68 MMHG

## 2023-07-10 DIAGNOSIS — F33.1 MAJOR DEPRESSIVE DISORDER, RECURRENT, MODERATE (HCC): ICD-10-CM

## 2023-07-10 DIAGNOSIS — Z79.4 TYPE 2 DIABETES MELLITUS WITH DIABETIC NEPHROPATHY, WITH LONG-TERM CURRENT USE OF INSULIN (HCC): ICD-10-CM

## 2023-07-10 DIAGNOSIS — E11.21 TYPE 2 DIABETES MELLITUS WITH DIABETIC NEPHROPATHY, WITH LONG-TERM CURRENT USE OF INSULIN (HCC): ICD-10-CM

## 2023-07-10 DIAGNOSIS — K50.119 CROHN'S COLITIS, UNSPECIFIED COMPLICATION (HCC): ICD-10-CM

## 2023-07-10 DIAGNOSIS — E78.49 OTHER HYPERLIPIDEMIA: ICD-10-CM

## 2023-07-10 DIAGNOSIS — E11.9 TYPE 2 DIABETES MELLITUS WITHOUT COMPLICATION, WITHOUT LONG-TERM CURRENT USE OF INSULIN (HCC): ICD-10-CM

## 2023-07-10 DIAGNOSIS — G89.29 CHRONIC MIDLINE BACK PAIN, UNSPECIFIED BACK LOCATION: Primary | ICD-10-CM

## 2023-07-10 DIAGNOSIS — I25.119 CORONARY ARTERY DISEASE INVOLVING NATIVE CORONARY ARTERY WITH ANGINA PECTORIS, UNSPECIFIED WHETHER NATIVE OR TRANSPLANTED HEART (HCC): ICD-10-CM

## 2023-07-10 DIAGNOSIS — S72.002S CLOSED FRACTURE OF LEFT HIP, SEQUELA: ICD-10-CM

## 2023-07-10 DIAGNOSIS — R42 LIGHTHEADED: ICD-10-CM

## 2023-07-10 DIAGNOSIS — G62.9 NEUROPATHY: ICD-10-CM

## 2023-07-10 DIAGNOSIS — K50.90 CROHN'S DISEASE WITHOUT COMPLICATION, UNSPECIFIED GASTROINTESTINAL TRACT LOCATION (HCC): ICD-10-CM

## 2023-07-10 DIAGNOSIS — M54.9 CHRONIC MIDLINE BACK PAIN, UNSPECIFIED BACK LOCATION: Primary | ICD-10-CM

## 2023-07-10 DIAGNOSIS — R05.3 CHRONIC COUGH: ICD-10-CM

## 2023-07-10 DIAGNOSIS — M54.42 CHRONIC MIDLINE LOW BACK PAIN WITH LEFT-SIDED SCIATICA: ICD-10-CM

## 2023-07-10 DIAGNOSIS — D64.9 FATIGUE ASSOCIATED WITH ANEMIA: ICD-10-CM

## 2023-07-10 DIAGNOSIS — F41.9 ANXIETY: ICD-10-CM

## 2023-07-10 DIAGNOSIS — D47.1 MYELOPROLIFERATIVE DISORDER (HCC): ICD-10-CM

## 2023-07-10 DIAGNOSIS — S72.142A CLOSED INTERTROCHANTERIC FRACTURE, LEFT, INITIAL ENCOUNTER (HCC): ICD-10-CM

## 2023-07-10 DIAGNOSIS — F33.0 MAJOR DEPRESSIVE DISORDER, RECURRENT, MILD (HCC): ICD-10-CM

## 2023-07-10 DIAGNOSIS — R53.83 OTHER FATIGUE: ICD-10-CM

## 2023-07-10 DIAGNOSIS — G89.29 CHRONIC MIDLINE LOW BACK PAIN WITH LEFT-SIDED SCIATICA: ICD-10-CM

## 2023-07-10 DIAGNOSIS — E78.5 DYSLIPIDEMIA: ICD-10-CM

## 2023-07-10 RX ORDER — GABAPENTIN 100 MG/1
CAPSULE ORAL
COMMUNITY
Start: 2023-06-21

## 2023-07-10 RX ORDER — GENTAMICIN SULFATE 1 MG/G
CREAM TOPICAL
COMMUNITY
Start: 2023-05-10

## 2023-07-10 RX ORDER — LORAZEPAM 0.5 MG/1
TABLET ORAL
COMMUNITY
Start: 2023-07-07

## 2023-07-10 ASSESSMENT — ENCOUNTER SYMPTOMS
DIARRHEA: 0
VOMITING: 0
PHOTOPHOBIA: 0
SORE THROAT: 0
ABDOMINAL PAIN: 1
APNEA: 0
RHINORRHEA: 0
CONSTIPATION: 0
ABDOMINAL DISTENTION: 0
WHEEZING: 1
SHORTNESS OF BREATH: 0
BACK PAIN: 1
CHEST TIGHTNESS: 0
NAUSEA: 0
VOICE CHANGE: 0
COUGH: 0
FACIAL SWELLING: 0
SINUS PAIN: 0
RECTAL PAIN: 0

## 2023-07-10 NOTE — PROGRESS NOTES
7/10/23  Darling Long : 1945 Sex: female  Age: 78 y.o. Chief Complaint   Patient presents with    Follow-up     Follow up on medications  Patient has a question r/t to her soma: she hasn't changed her diet or anything and she feels like she always has to change it: thicker stool with a ileostomy     Discuss Medications     Patient has questions related to gabapentin        HPIoverall check up   Colostomy problems     Review of Systems   Constitutional:  Negative for appetite change, chills, diaphoresis, fatigue, fever and unexpected weight change. HENT:  Negative for congestion, dental problem, ear discharge, ear pain, facial swelling, hearing loss, mouth sores, rhinorrhea, sinus pain, sore throat, tinnitus and voice change. Eyes:  Negative for photophobia and visual disturbance (no visual changes). Respiratory:  Positive for wheezing (on occasion   with a deep breath). Negative for apnea, cough, chest tightness and shortness of breath. Cardiovascular:  Negative for chest pain and palpitations. Gastrointestinal:  Positive for abdominal pain. Negative for abdominal distention, constipation, diarrhea, nausea, rectal pain and vomiting. Following with Dr Franny Arias    Endocrine: Negative for cold intolerance, heat intolerance and polyuria. Genitourinary:  Positive for urgency. Negative for dysuria, frequency and hematuria. Vaginal problems    Musculoskeletal:  Positive for arthralgias (recent fractured hip was in lucas now out), back pain and gait problem. Negative for joint swelling, myalgias, neck pain and neck stiffness. Skin:  Negative for rash and wound. Allergic/Immunologic: Negative for environmental allergies. Neurological:  Negative for dizziness, tremors, seizures, syncope, facial asymmetry, speech difficulty, weakness, light-headedness, numbness and headaches. Hematological:  Does not bruise/bleed easily.    Psychiatric/Behavioral:  Negative for agitation,

## 2023-07-11 LAB
ALBUMIN SERPL-MCNC: 4.5 G/DL (ref 3.5–5.2)
ALP SERPL-CCNC: 103 U/L (ref 35–104)
ALT SERPL-CCNC: 8 U/L (ref 0–32)
ANION GAP SERPL CALCULATED.3IONS-SCNC: 17 MMOL/L (ref 7–16)
ANISOCYTOSIS: ABNORMAL
AST SERPL-CCNC: 26 U/L (ref 0–31)
BASOPHILS # BLD: 0.07 E9/L (ref 0–0.2)
BASOPHILS NFR BLD: 1 % (ref 0–2)
BILIRUB SERPL-MCNC: 0.4 MG/DL (ref 0–1.2)
BUN SERPL-MCNC: 21 MG/DL (ref 6–23)
CALCIUM SERPL-MCNC: 9.4 MG/DL (ref 8.6–10.2)
CHLORIDE SERPL-SCNC: 102 MMOL/L (ref 98–107)
CHOLESTEROL, TOTAL: 188 MG/DL (ref 0–199)
CO2 SERPL-SCNC: 22 MMOL/L (ref 22–29)
CREAT SERPL-MCNC: 0.7 MG/DL (ref 0.5–1)
EOSINOPHIL # BLD: 0.17 E9/L (ref 0.05–0.5)
EOSINOPHIL NFR BLD: 2.3 % (ref 0–6)
ERYTHROCYTE [DISTWIDTH] IN BLOOD BY AUTOMATED COUNT: 14 FL (ref 11.5–15)
FOLATE SERPL-MCNC: >20 NG/ML (ref 4.8–24.2)
GLUCOSE SERPL-MCNC: 98 MG/DL (ref 74–99)
HBA1C MFR BLD: 5.4 % (ref 4–5.6)
HCT VFR BLD AUTO: 43.6 % (ref 34–48)
HDLC SERPL-MCNC: 55 MG/DL
HGB BLD-MCNC: 13.6 G/DL (ref 11.5–15.5)
IMM GRANULOCYTES # BLD: 0.04 E9/L
IMM GRANULOCYTES NFR BLD: 0.5 % (ref 0–5)
IRON SERPL-MCNC: 63 MCG/DL (ref 37–145)
LDLC SERPL CALC-MCNC: 108 MG/DL (ref 0–99)
LYMPHOCYTES # BLD: 0.79 E9/L (ref 1.5–4)
LYMPHOCYTES NFR BLD: 10.8 % (ref 20–42)
MCH RBC QN AUTO: 35.9 PG (ref 26–35)
MCHC RBC AUTO-ENTMCNC: 31.2 % (ref 32–34.5)
MCV RBC AUTO: 115 FL (ref 80–99.9)
MONOCYTES # BLD: 0.56 E9/L (ref 0.1–0.95)
MONOCYTES NFR BLD: 7.6 % (ref 2–12)
NEUTROPHILS # BLD: 5.71 E9/L (ref 1.8–7.3)
NEUTS SEG NFR BLD: 77.8 % (ref 43–80)
OVALOCYTES: ABNORMAL
PLATELET # BLD AUTO: 331 E9/L (ref 130–450)
PMV BLD AUTO: 10.6 FL (ref 7–12)
POIKILOCYTES: ABNORMAL
POTASSIUM SERPL-SCNC: 5.2 MMOL/L (ref 3.5–5)
PROT SERPL-MCNC: 7.2 G/DL (ref 6.4–8.3)
RBC # BLD AUTO: 3.79 E12/L (ref 3.5–5.5)
SODIUM SERPL-SCNC: 141 MMOL/L (ref 132–146)
T4 FREE SERPL-MCNC: 1.3 NG/DL (ref 0.93–1.7)
TRIGL SERPL-MCNC: 126 MG/DL (ref 0–149)
TSH SERPL-MCNC: 1.42 UIU/ML (ref 0.27–4.2)
VIT B12 SERPL-MCNC: 451 PG/ML (ref 211–946)
VLDLC SERPL CALC-MCNC: 25 MG/DL
WBC # BLD: 7.3 E9/L (ref 4.5–11.5)

## 2023-07-27 DIAGNOSIS — G89.29 CHRONIC MIDLINE LOW BACK PAIN WITH LEFT-SIDED SCIATICA: ICD-10-CM

## 2023-07-27 DIAGNOSIS — M54.42 CHRONIC MIDLINE LOW BACK PAIN WITH LEFT-SIDED SCIATICA: ICD-10-CM

## 2023-07-27 RX ORDER — HYDROCODONE BITARTRATE AND ACETAMINOPHEN 7.5; 325 MG/1; MG/1
1 TABLET ORAL 4 TIMES DAILY
Qty: 120 TABLET | Refills: 0 | Status: SHIPPED | OUTPATIENT
Start: 2023-07-27 | End: 2023-08-26

## 2023-07-28 RX ORDER — DICYCLOMINE HCL 20 MG
20 TABLET ORAL 2 TIMES DAILY PRN
Qty: 60 TABLET | Refills: 1 | Status: SHIPPED | OUTPATIENT
Start: 2023-07-28 | End: 2024-01-24

## 2023-08-25 RX ORDER — DICYCLOMINE HCL 20 MG
20 TABLET ORAL 2 TIMES DAILY PRN
Qty: 60 TABLET | Refills: 1 | Status: SHIPPED | OUTPATIENT
Start: 2023-08-25 | End: 2024-02-21

## 2023-08-30 DIAGNOSIS — G89.29 CHRONIC MIDLINE LOW BACK PAIN WITH LEFT-SIDED SCIATICA: ICD-10-CM

## 2023-08-30 DIAGNOSIS — M54.42 CHRONIC MIDLINE LOW BACK PAIN WITH LEFT-SIDED SCIATICA: ICD-10-CM

## 2023-08-30 RX ORDER — HYDROCODONE BITARTRATE AND ACETAMINOPHEN 7.5; 325 MG/1; MG/1
1 TABLET ORAL 4 TIMES DAILY
Qty: 120 TABLET | Refills: 0 | Status: SHIPPED | OUTPATIENT
Start: 2023-08-30 | End: 2023-09-29

## 2023-09-13 DIAGNOSIS — F41.9 ANXIETY: ICD-10-CM

## 2023-09-14 RX ORDER — LORAZEPAM 0.5 MG/1
1 TABLET ORAL NIGHTLY
Qty: 60 TABLET | Refills: 2 | Status: SHIPPED | OUTPATIENT
Start: 2023-09-14 | End: 2023-12-13

## 2023-09-25 DIAGNOSIS — G89.29 CHRONIC MIDLINE LOW BACK PAIN WITH LEFT-SIDED SCIATICA: ICD-10-CM

## 2023-09-25 DIAGNOSIS — M54.42 CHRONIC MIDLINE LOW BACK PAIN WITH LEFT-SIDED SCIATICA: ICD-10-CM

## 2023-09-25 RX ORDER — HYDROCODONE BITARTRATE AND ACETAMINOPHEN 7.5; 325 MG/1; MG/1
1 TABLET ORAL 4 TIMES DAILY
Qty: 120 TABLET | Refills: 0 | Status: SHIPPED | OUTPATIENT
Start: 2023-09-25 | End: 2023-10-25

## 2023-09-29 DIAGNOSIS — F51.01 PRIMARY INSOMNIA: ICD-10-CM

## 2023-09-29 RX ORDER — ZOLPIDEM TARTRATE 5 MG/1
TABLET ORAL
Qty: 30 TABLET | Refills: 2 | Status: SHIPPED | OUTPATIENT
Start: 2023-09-29 | End: 2023-12-22

## 2023-09-29 RX ORDER — SIMVASTATIN 40 MG
40 TABLET ORAL NIGHTLY
Qty: 90 TABLET | Refills: 1 | Status: SHIPPED | OUTPATIENT
Start: 2023-09-29 | End: 2024-03-27

## 2023-10-23 DIAGNOSIS — M54.42 CHRONIC MIDLINE LOW BACK PAIN WITH LEFT-SIDED SCIATICA: ICD-10-CM

## 2023-10-23 DIAGNOSIS — G89.29 CHRONIC MIDLINE LOW BACK PAIN WITH LEFT-SIDED SCIATICA: ICD-10-CM

## 2023-10-23 RX ORDER — HYDROCODONE BITARTRATE AND ACETAMINOPHEN 7.5; 325 MG/1; MG/1
1 TABLET ORAL 4 TIMES DAILY
Qty: 120 TABLET | Refills: 0 | Status: SHIPPED | OUTPATIENT
Start: 2023-10-23 | End: 2023-11-22

## 2023-11-16 RX ORDER — FUROSEMIDE 20 MG/1
20 TABLET ORAL DAILY
Qty: 90 TABLET | Refills: 1 | Status: SHIPPED | OUTPATIENT
Start: 2023-11-16 | End: 2024-05-14

## 2023-11-16 NOTE — TELEPHONE ENCOUNTER
----- Message from Oscar Gary sent at 11/16/2023  1:39 PM EST -----  Subject: Refill Request    QUESTIONS  Name of Medication? furosemide (LASIX) 20 MG tablet  Patient-reported dosage and instructions? once every other day  How many days do you have left? 1  Preferred Pharmacy? [address] phone number (if available)? [phone]  ---------------------------------------------------------------------------  --------------  CALL BACK INFO  What is the best way for the office to contact you? OK to leave message on   voicemail  Preferred Call Back Phone Number? [phone]  ---------------------------------------------------------------------------  --------------  SCRIPT ANSWERS  Relationship to Patient?  Self

## 2023-11-21 DIAGNOSIS — G89.29 CHRONIC MIDLINE LOW BACK PAIN WITH LEFT-SIDED SCIATICA: ICD-10-CM

## 2023-11-21 DIAGNOSIS — M54.42 CHRONIC MIDLINE LOW BACK PAIN WITH LEFT-SIDED SCIATICA: ICD-10-CM

## 2023-11-21 RX ORDER — GABAPENTIN 100 MG/1
100 CAPSULE ORAL 4 TIMES DAILY
Qty: 90 CAPSULE | Refills: 0 | Status: SHIPPED | OUTPATIENT
Start: 2023-11-21 | End: 2023-12-21

## 2023-11-21 RX ORDER — HYDROCODONE BITARTRATE AND ACETAMINOPHEN 7.5; 325 MG/1; MG/1
1 TABLET ORAL 4 TIMES DAILY
Qty: 120 TABLET | Refills: 0 | Status: SHIPPED | OUTPATIENT
Start: 2023-11-21 | End: 2023-12-21

## 2023-11-21 RX ORDER — DICYCLOMINE HCL 20 MG
20 TABLET ORAL 2 TIMES DAILY PRN
Qty: 60 TABLET | Refills: 1 | Status: SHIPPED | OUTPATIENT
Start: 2023-11-21 | End: 2024-05-19

## 2023-11-21 NOTE — TELEPHONE ENCOUNTER
----- Message from Peter Crowley sent at 11/21/2023 10:50 AM EST -----  Subject: Refill Request    QUESTIONS  Name of Medication? HYDROcodone-acetaminophen (NORCO) 7.5-325 MG per   tablet  Patient-reported dosage and instructions? one tablet 4 times a day   How many days do you have left? 5  Preferred Pharmacy? [address] [facility] phone number (if available)? [phone]  ---------------------------------------------------------------------------  --------------,  Name of Medication? gabapentin (NEURONTIN) 100 MG capsule  Patient-reported dosage and instructions? one tablet nightly   How many days do you have left? 0  Preferred Pharmacy? [address] [facility] phone number (if available)? [phone]  ---------------------------------------------------------------------------  --------------,  Name of Medication? dicyclomine (BENTYL) 20 MG tablet  Patient-reported dosage and instructions? one tablet 3 times a day as   needed   How many days do you have left? 2  Preferred Pharmacy? [address] [facility] phone number (if available)? [phone]  ---------------------------------------------------------------------------  --------------  CALL BACK INFO  What is the best way for the office to contact you? Do not leave any   message, patient will call back for answer  Preferred Call Back Phone Number? [phone]  ---------------------------------------------------------------------------  --------------  SCRIPT ANSWERS  Relationship to Patient?  Self

## 2023-12-14 DIAGNOSIS — G89.29 CHRONIC MIDLINE LOW BACK PAIN WITH LEFT-SIDED SCIATICA: ICD-10-CM

## 2023-12-14 DIAGNOSIS — F41.9 ANXIETY: ICD-10-CM

## 2023-12-14 DIAGNOSIS — M54.42 CHRONIC MIDLINE LOW BACK PAIN WITH LEFT-SIDED SCIATICA: ICD-10-CM

## 2023-12-14 RX ORDER — HYDROCODONE BITARTRATE AND ACETAMINOPHEN 7.5; 325 MG/1; MG/1
1 TABLET ORAL 4 TIMES DAILY
Qty: 120 TABLET | Refills: 0 | Status: SHIPPED | OUTPATIENT
Start: 2023-12-14 | End: 2024-01-13

## 2023-12-14 RX ORDER — LORAZEPAM 0.5 MG/1
1 TABLET ORAL NIGHTLY
Qty: 60 TABLET | Refills: 2 | Status: SHIPPED | OUTPATIENT
Start: 2023-12-14 | End: 2024-03-13

## 2023-12-22 ENCOUNTER — OFFICE VISIT (OUTPATIENT)
Dept: FAMILY MEDICINE CLINIC | Age: 78
End: 2023-12-22

## 2023-12-22 VITALS
TEMPERATURE: 98 F | OXYGEN SATURATION: 94 % | BODY MASS INDEX: 22.47 KG/M2 | HEIGHT: 64 IN | HEART RATE: 57 BPM | RESPIRATION RATE: 16 BRPM | WEIGHT: 131.6 LBS | DIASTOLIC BLOOD PRESSURE: 76 MMHG | SYSTOLIC BLOOD PRESSURE: 128 MMHG

## 2023-12-22 DIAGNOSIS — Z23 FLU VACCINE NEED: Primary | ICD-10-CM

## 2023-12-22 RX ORDER — AMOXICILLIN 500 MG/1
500 CAPSULE ORAL 3 TIMES DAILY
Qty: 21 CAPSULE | Refills: 0 | Status: SHIPPED | OUTPATIENT
Start: 2023-12-22 | End: 2023-12-29

## 2023-12-22 NOTE — PROGRESS NOTES
Psychiatric/Behavioral:  Negative for agitation, hallucinations and suicidal ideas. The patient is not nervous/anxious.          Physical Exam  Vitals reviewed.   Constitutional:       General: She is not in acute distress.     Appearance: She is ill-appearing.   HENT:      Head: Normocephalic.      Right Ear: Tympanic membrane, ear canal and external ear normal. There is no impacted cerumen.      Left Ear: Tympanic membrane, ear canal and external ear normal. There is no impacted cerumen.      Nose: Rhinorrhea present.      Mouth/Throat:      Pharynx: Posterior oropharyngeal erythema present.   Eyes:      General:         Right eye: No discharge.         Left eye: No discharge.      Extraocular Movements: Extraocular movements intact.      Pupils: Pupils are equal, round, and reactive to light.   Neck:      Vascular: No carotid bruit.   Cardiovascular:      Rate and Rhythm: Regular rhythm.      Heart sounds: Murmur (1/6 murmur ) heard.      No gallop.   Pulmonary:      Breath sounds: Rhonchi present. No rales.   Abdominal:      Palpations: Abdomen is soft. There is no mass.      Tenderness: There is no abdominal tenderness. There is no guarding or rebound.      Hernia: No hernia is present.      Comments: Ostomy    Musculoskeletal:         General: Normal range of motion.      Cervical back: Neck supple.      Comments: Back pain and tenderness in the low back and sacral area   Lymphadenopathy:      Cervical: No cervical adenopathy.   Skin:     General: Skin is warm and dry.      Findings: Rash (neck area) present. No bruising.   Neurological:      General: No focal deficit present.      Mental Status: She is alert and oriented to person, place, and time.      Cranial Nerves: No cranial nerve deficit.      Motor: Weakness present.      Coordination: Coordination abnormal.      Gait: Gait abnormal.   Psychiatric:         Mood and Affect: Mood normal.         Behavior: Behavior normal.         Assessment and

## 2023-12-26 DIAGNOSIS — F51.01 PRIMARY INSOMNIA: ICD-10-CM

## 2023-12-26 RX ORDER — ZOLPIDEM TARTRATE 5 MG/1
TABLET ORAL
Qty: 30 TABLET | Refills: 2 | Status: SHIPPED | OUTPATIENT
Start: 2023-12-26 | End: 2024-03-19

## 2023-12-26 RX ORDER — METOPROLOL TARTRATE 50 MG/1
50 TABLET, FILM COATED ORAL DAILY
Qty: 90 TABLET | Refills: 1 | Status: SHIPPED | OUTPATIENT
Start: 2023-12-26

## 2023-12-26 NOTE — TELEPHONE ENCOUNTER
Patients last appointment 12/22/2023.   Patients next scheduled appointment   Future Appointments   Date Time Provider [id number]   3/22/2024 11:00 AM Daphne Freitas DO E[facility]

## 2023-12-28 ENCOUNTER — TELEPHONE (OUTPATIENT)
Dept: FAMILY MEDICINE CLINIC | Age: 78
End: 2023-12-28

## 2023-12-28 NOTE — TELEPHONE ENCOUNTER
Wants to go to a relatives  tomorrow at 11.  But her daughter has been sick,was at the doctor, and he said an 78 year old should not be going to a  due to the risk of picking up respiratory illness.    What does Dr. Carter recommend?

## 2024-01-02 ENCOUNTER — TELEPHONE (OUTPATIENT)
Dept: FAMILY MEDICINE CLINIC | Age: 79
End: 2024-01-02

## 2024-01-02 NOTE — TELEPHONE ENCOUNTER
Pt states that she was prescribed Amoxicillin 12/22/23 and states she took her last one this AM and it was helping but she still has some chest congestion and a gaging feeling in her throat and wondered if she could have a refill ?

## 2024-01-03 NOTE — TELEPHONE ENCOUNTER
Called pt to schedule but she states she is feeling a lot better and its starting to go away so she doesn't wish to schedule at this time and will call if she changes her mind

## 2024-01-04 RX ORDER — OMEPRAZOLE 40 MG/1
40 CAPSULE, DELAYED RELEASE ORAL DAILY
Qty: 90 CAPSULE | Refills: 1 | Status: SHIPPED | OUTPATIENT
Start: 2024-01-04 | End: 2024-07-02

## 2024-01-04 RX ORDER — HYDROXYUREA 500 MG/1
500 CAPSULE ORAL DAILY
Qty: 90 CAPSULE | Refills: 1 | Status: SHIPPED | OUTPATIENT
Start: 2024-01-04 | End: 2024-07-02

## 2024-01-04 NOTE — TELEPHONE ENCOUNTER
Patients last appointment 12/22/2023.  Patients next scheduled appointment   Future Appointments   Date Time Provider Department Center   3/22/2024 11:00 AM Juan Carlos Carter DO E. [facility]

## 2024-01-12 RX ORDER — GABAPENTIN 100 MG/1
300 CAPSULE ORAL 4 TIMES DAILY
Qty: 90 CAPSULE | Refills: 0 | Status: SHIPPED | OUTPATIENT
Start: 2024-01-12 | End: 2024-02-11

## 2024-01-12 NOTE — TELEPHONE ENCOUNTER
Pt states she has been getting the 300mg and the last time it was filled it was only filled as 100mg

## 2024-01-13 ASSESSMENT — ENCOUNTER SYMPTOMS
SHORTNESS OF BREATH: 0
VOICE CHANGE: 0
ABDOMINAL DISTENTION: 0
VOMITING: 0
PHOTOPHOBIA: 0
CONSTIPATION: 0
CHEST TIGHTNESS: 0
RHINORRHEA: 0
FACIAL SWELLING: 0
NAUSEA: 0
APNEA: 0
RECTAL PAIN: 0
WHEEZING: 1

## 2024-01-15 ENCOUNTER — TELEPHONE (OUTPATIENT)
Dept: FAMILY MEDICINE CLINIC | Age: 79
End: 2024-01-15

## 2024-01-15 DIAGNOSIS — M54.42 CHRONIC MIDLINE LOW BACK PAIN WITH LEFT-SIDED SCIATICA: ICD-10-CM

## 2024-01-15 DIAGNOSIS — G89.29 CHRONIC MIDLINE LOW BACK PAIN WITH LEFT-SIDED SCIATICA: ICD-10-CM

## 2024-01-15 RX ORDER — GABAPENTIN 100 MG/1
300 CAPSULE ORAL 4 TIMES DAILY
Qty: 360 CAPSULE | Refills: 2 | Status: SHIPPED | OUTPATIENT
Start: 2024-01-15 | End: 2024-04-14

## 2024-01-15 RX ORDER — HYDROCODONE BITARTRATE AND ACETAMINOPHEN 7.5; 325 MG/1; MG/1
1 TABLET ORAL 4 TIMES DAILY
Qty: 120 TABLET | Refills: 0 | Status: SHIPPED | OUTPATIENT
Start: 2024-01-15 | End: 2024-02-14

## 2024-01-15 NOTE — TELEPHONE ENCOUNTER
Pharmacy called and asked for a new script to be sent over for her Gabapentin 100 mg due to the previous quantity and instructions not matching up.     [facility]

## 2024-02-09 DIAGNOSIS — G89.29 CHRONIC MIDLINE LOW BACK PAIN WITH LEFT-SIDED SCIATICA: ICD-10-CM

## 2024-02-09 DIAGNOSIS — M54.42 CHRONIC MIDLINE LOW BACK PAIN WITH LEFT-SIDED SCIATICA: ICD-10-CM

## 2024-02-09 RX ORDER — HYDROCODONE BITARTRATE AND ACETAMINOPHEN 7.5; 325 MG/1; MG/1
1 TABLET ORAL 4 TIMES DAILY
Qty: 120 TABLET | Refills: 0 | Status: SHIPPED | OUTPATIENT
Start: 2024-02-09 | End: 2024-03-10

## 2024-02-16 RX ORDER — CITALOPRAM 20 MG/1
20 TABLET ORAL DAILY
Qty: 90 TABLET | Refills: 1 | Status: SHIPPED | OUTPATIENT
Start: 2024-02-16

## 2024-02-16 RX ORDER — FOLIC ACID 1 MG/1
1 TABLET ORAL DAILY
Qty: 90 TABLET | Refills: 1 | Status: SHIPPED | OUTPATIENT
Start: 2024-02-16 | End: 2024-08-14

## 2024-02-16 RX ORDER — ASPIRIN 325 MG
325 TABLET, DELAYED RELEASE (ENTERIC COATED) ORAL 2 TIMES DAILY
Qty: 30 TABLET | Refills: 3 | Status: SHIPPED | OUTPATIENT
Start: 2024-02-16

## 2024-02-22 RX ORDER — DICYCLOMINE HCL 20 MG
20 TABLET ORAL 2 TIMES DAILY PRN
Qty: 60 TABLET | Refills: 1 | Status: SHIPPED | OUTPATIENT
Start: 2024-02-22 | End: 2024-08-20

## 2024-03-08 DIAGNOSIS — M54.42 CHRONIC MIDLINE LOW BACK PAIN WITH LEFT-SIDED SCIATICA: ICD-10-CM

## 2024-03-08 DIAGNOSIS — G89.29 CHRONIC MIDLINE LOW BACK PAIN WITH LEFT-SIDED SCIATICA: ICD-10-CM

## 2024-03-08 RX ORDER — HYDROCODONE BITARTRATE AND ACETAMINOPHEN 7.5; 325 MG/1; MG/1
1 TABLET ORAL 4 TIMES DAILY
Qty: 120 TABLET | Refills: 0 | Status: SHIPPED | OUTPATIENT
Start: 2024-03-08 | End: 2024-04-07

## 2024-03-29 DIAGNOSIS — G89.29 CHRONIC MIDLINE LOW BACK PAIN WITH LEFT-SIDED SCIATICA: ICD-10-CM

## 2024-03-29 DIAGNOSIS — M54.42 CHRONIC MIDLINE LOW BACK PAIN WITH LEFT-SIDED SCIATICA: ICD-10-CM

## 2024-03-29 DIAGNOSIS — F51.01 PRIMARY INSOMNIA: ICD-10-CM

## 2024-03-29 RX ORDER — HYDROCODONE BITARTRATE AND ACETAMINOPHEN 7.5; 325 MG/1; MG/1
1 TABLET ORAL 4 TIMES DAILY
Qty: 120 TABLET | Refills: 0 | Status: SHIPPED | OUTPATIENT
Start: 2024-03-29 | End: 2024-04-28

## 2024-03-29 RX ORDER — ZOLPIDEM TARTRATE 5 MG/1
TABLET ORAL
Qty: 30 TABLET | Refills: 2 | Status: SHIPPED | OUTPATIENT
Start: 2024-03-29 | End: 2024-06-21

## 2024-04-05 DIAGNOSIS — F41.9 ANXIETY: ICD-10-CM

## 2024-04-05 RX ORDER — LORAZEPAM 0.5 MG/1
1 TABLET ORAL NIGHTLY
Qty: 60 TABLET | Refills: 2 | Status: SHIPPED | OUTPATIENT
Start: 2024-04-05 | End: 2024-07-04

## 2024-04-26 RX ORDER — SIMVASTATIN 40 MG
40 TABLET ORAL NIGHTLY
Qty: 90 TABLET | Refills: 1 | Status: SHIPPED | OUTPATIENT
Start: 2024-04-26 | End: 2024-10-23

## 2024-04-26 RX ORDER — DICYCLOMINE HCL 20 MG
20 TABLET ORAL 2 TIMES DAILY PRN
Qty: 60 TABLET | Refills: 1 | Status: SHIPPED | OUTPATIENT
Start: 2024-04-26 | End: 2024-10-23

## 2024-04-30 DIAGNOSIS — G89.29 CHRONIC MIDLINE LOW BACK PAIN WITH LEFT-SIDED SCIATICA: ICD-10-CM

## 2024-04-30 DIAGNOSIS — F51.01 PRIMARY INSOMNIA: ICD-10-CM

## 2024-04-30 DIAGNOSIS — M54.42 CHRONIC MIDLINE LOW BACK PAIN WITH LEFT-SIDED SCIATICA: ICD-10-CM

## 2024-04-30 RX ORDER — HYDROCODONE BITARTRATE AND ACETAMINOPHEN 7.5; 325 MG/1; MG/1
1 TABLET ORAL 4 TIMES DAILY
Qty: 120 TABLET | Refills: 0 | Status: SHIPPED | OUTPATIENT
Start: 2024-04-30 | End: 2024-05-30

## 2024-04-30 RX ORDER — ZOLPIDEM TARTRATE 5 MG/1
TABLET ORAL
Qty: 30 TABLET | Refills: 2 | Status: SHIPPED | OUTPATIENT
Start: 2024-04-30 | End: 2024-07-23

## 2024-04-30 NOTE — TELEPHONE ENCOUNTER
Patients last appointment 12/22/2023.  Patients next scheduled appointment No future appointments.

## 2024-05-24 DIAGNOSIS — M54.42 CHRONIC MIDLINE LOW BACK PAIN WITH LEFT-SIDED SCIATICA: ICD-10-CM

## 2024-05-24 DIAGNOSIS — G89.29 CHRONIC MIDLINE LOW BACK PAIN WITH LEFT-SIDED SCIATICA: ICD-10-CM

## 2024-05-24 RX ORDER — HYDROCODONE BITARTRATE AND ACETAMINOPHEN 7.5; 325 MG/1; MG/1
1 TABLET ORAL 4 TIMES DAILY
Qty: 120 TABLET | Refills: 0 | Status: SHIPPED | OUTPATIENT
Start: 2024-05-24 | End: 2024-06-23

## 2024-05-24 RX ORDER — OMEPRAZOLE 40 MG/1
40 CAPSULE, DELAYED RELEASE ORAL DAILY
Qty: 90 CAPSULE | Refills: 1 | Status: SHIPPED | OUTPATIENT
Start: 2024-05-24

## 2024-05-24 NOTE — TELEPHONE ENCOUNTER
Refill   Last office visit  12/22/23.  Should be coming every 3 mos. Due to her meds.    I called Marilu she was to come in today but cancelled due to the carnival mess! She r/s to 5/31/24.

## 2024-05-31 ENCOUNTER — OFFICE VISIT (OUTPATIENT)
Dept: FAMILY MEDICINE CLINIC | Age: 79
End: 2024-05-31

## 2024-05-31 VITALS
WEIGHT: 125 LBS | SYSTOLIC BLOOD PRESSURE: 110 MMHG | HEART RATE: 67 BPM | RESPIRATION RATE: 18 BRPM | TEMPERATURE: 97.8 F | OXYGEN SATURATION: 95 % | BODY MASS INDEX: 21.34 KG/M2 | HEIGHT: 64 IN | DIASTOLIC BLOOD PRESSURE: 72 MMHG

## 2024-05-31 VITALS
HEIGHT: 64 IN | WEIGHT: 125.2 LBS | BODY MASS INDEX: 21.37 KG/M2 | HEART RATE: 67 BPM | SYSTOLIC BLOOD PRESSURE: 110 MMHG | DIASTOLIC BLOOD PRESSURE: 72 MMHG | RESPIRATION RATE: 18 BRPM | OXYGEN SATURATION: 95 % | TEMPERATURE: 97.8 F

## 2024-05-31 DIAGNOSIS — Z79.4 TYPE 2 DIABETES MELLITUS WITH DIABETIC NEPHROPATHY, WITH LONG-TERM CURRENT USE OF INSULIN (HCC): Primary | ICD-10-CM

## 2024-05-31 DIAGNOSIS — N39.0 URINARY TRACT INFECTION WITH HEMATURIA, SITE UNSPECIFIED: ICD-10-CM

## 2024-05-31 DIAGNOSIS — L30.9 DERMATITIS: ICD-10-CM

## 2024-05-31 DIAGNOSIS — Z43.2 ATTENTION TO ILEOSTOMY (HCC): ICD-10-CM

## 2024-05-31 DIAGNOSIS — F33.0 MAJOR DEPRESSIVE DISORDER, RECURRENT, MILD (HCC): ICD-10-CM

## 2024-05-31 DIAGNOSIS — Z00.00 MEDICARE ANNUAL WELLNESS VISIT, SUBSEQUENT: Primary | ICD-10-CM

## 2024-05-31 DIAGNOSIS — I25.119 CORONARY ARTERY DISEASE INVOLVING NATIVE CORONARY ARTERY WITH ANGINA PECTORIS, UNSPECIFIED WHETHER NATIVE OR TRANSPLANTED HEART (HCC): ICD-10-CM

## 2024-05-31 DIAGNOSIS — K50.119 CROHN'S COLITIS, UNSPECIFIED COMPLICATION (HCC): ICD-10-CM

## 2024-05-31 DIAGNOSIS — E43 SEVERE PROTEIN-CALORIE MALNUTRITION (HCC): ICD-10-CM

## 2024-05-31 DIAGNOSIS — R31.9 URINARY TRACT INFECTION WITH HEMATURIA, SITE UNSPECIFIED: ICD-10-CM

## 2024-05-31 DIAGNOSIS — D47.1 MYELOPROLIFERATIVE DISORDER (HCC): ICD-10-CM

## 2024-05-31 DIAGNOSIS — E78.5 HYPERLIPIDEMIA, UNSPECIFIED HYPERLIPIDEMIA TYPE: ICD-10-CM

## 2024-05-31 DIAGNOSIS — E11.21 TYPE 2 DIABETES MELLITUS WITH DIABETIC NEPHROPATHY, WITH LONG-TERM CURRENT USE OF INSULIN (HCC): Primary | ICD-10-CM

## 2024-05-31 DIAGNOSIS — G57.93 NEUROPATHIC PAIN OF BOTH LEGS: ICD-10-CM

## 2024-05-31 PROBLEM — F33.1 MAJOR DEPRESSIVE DISORDER, RECURRENT, MODERATE (HCC): Status: RESOLVED | Noted: 2021-10-01 | Resolved: 2024-05-31

## 2024-05-31 PROBLEM — J41.1 BRONCHITIS, MUCOPURULENT RECURRENT (HCC): Status: RESOLVED | Noted: 2022-07-25 | Resolved: 2024-05-31

## 2024-05-31 LAB
ALBUMIN: 4.5 G/DL (ref 3.5–5.2)
ALP BLD-CCNC: 108 U/L (ref 35–104)
ALT SERPL-CCNC: 6 U/L (ref 0–32)
ANION GAP SERPL CALCULATED.3IONS-SCNC: 12 MMOL/L (ref 7–16)
AST SERPL-CCNC: 19 U/L (ref 0–31)
BASOPHILS ABSOLUTE: 0.06 K/UL (ref 0–0.2)
BASOPHILS RELATIVE PERCENT: 1 % (ref 0–2)
BILIRUB SERPL-MCNC: 0.4 MG/DL (ref 0–1.2)
BILIRUBIN, POC: NEGATIVE
BLOOD URINE, POC: NORMAL
BUN BLDV-MCNC: 30 MG/DL (ref 6–23)
CALCIUM SERPL-MCNC: 9.4 MG/DL (ref 8.6–10.2)
CHLORIDE BLD-SCNC: 101 MMOL/L (ref 98–107)
CHOLESTEROL, TOTAL: 149 MG/DL
CLARITY, POC: NORMAL
CO2: 24 MMOL/L (ref 22–29)
COLOR, POC: NORMAL
CREAT SERPL-MCNC: 0.9 MG/DL (ref 0.5–1)
EOSINOPHILS ABSOLUTE: 0.17 K/UL (ref 0.05–0.5)
EOSINOPHILS RELATIVE PERCENT: 3 % (ref 0–6)
FOLATE: >20 NG/ML (ref 4.8–24.2)
GFR, ESTIMATED: 68 ML/MIN/1.73M2
GLUCOSE BLD-MCNC: 100 MG/DL (ref 74–99)
GLUCOSE URINE, POC: NEGATIVE
HBA1C MFR BLD: 5.7 % (ref 4–5.6)
HCT VFR BLD CALC: 42.1 % (ref 34–48)
HDLC SERPL-MCNC: 47 MG/DL
HEMOGLOBIN: 13.4 G/DL (ref 11.5–15.5)
KETONES, POC: NEGATIVE
LDL CHOLESTEROL: 75 MG/DL
LEUKOCYTE EST, POC: NORMAL
LYMPHOCYTES ABSOLUTE: 0.4 K/UL (ref 1.5–4)
LYMPHOCYTES RELATIVE PERCENT: 6 % (ref 20–42)
MCH RBC QN AUTO: 36.8 PG (ref 26–35)
MCHC RBC AUTO-ENTMCNC: 31.8 G/DL (ref 32–34.5)
MCV RBC AUTO: 115.7 FL (ref 80–99.9)
MONOCYTES ABSOLUTE: 0.46 K/UL (ref 0.1–0.95)
MONOCYTES RELATIVE PERCENT: 7 % (ref 2–12)
NEUTROPHILS ABSOLUTE: 5.6 K/UL (ref 1.8–7.3)
NEUTROPHILS RELATIVE PERCENT: 84 % (ref 43–80)
NITRITE, POC: NEGATIVE
PDW BLD-RTO: 13.4 % (ref 11.5–15)
PH, POC: 5.5
PLATELET # BLD: 359 K/UL (ref 130–450)
PMV BLD AUTO: 10.4 FL (ref 7–12)
POTASSIUM SERPL-SCNC: 5.4 MMOL/L (ref 3.5–5)
PROTEIN, POC: NEGATIVE
RBC # BLD: 3.64 M/UL (ref 3.5–5.5)
RBC # BLD: ABNORMAL 10*6/UL
SODIUM BLD-SCNC: 137 MMOL/L (ref 132–146)
SPECIFIC GRAVITY, POC: 1.03
TOTAL PROTEIN: 7.1 G/DL (ref 6.4–8.3)
TRIGL SERPL-MCNC: 135 MG/DL
TSH SERPL DL<=0.05 MIU/L-ACNC: 1.76 UIU/ML (ref 0.27–4.2)
UROBILINOGEN, POC: NORMAL
VITAMIN B-12: 483 PG/ML (ref 211–946)
VLDLC SERPL CALC-MCNC: 27 MG/DL
WBC # BLD: 6.7 K/UL (ref 4.5–11.5)

## 2024-05-31 RX ORDER — FUROSEMIDE 20 MG/1
20 TABLET ORAL DAILY
Qty: 90 TABLET | Refills: 1 | Status: SHIPPED | OUTPATIENT
Start: 2024-05-31 | End: 2024-11-27

## 2024-05-31 RX ORDER — TRIAMCINOLONE ACETONIDE 1 MG/G
CREAM TOPICAL
Qty: 30 G | Refills: 1 | Status: SHIPPED | OUTPATIENT
Start: 2024-05-31

## 2024-05-31 SDOH — ECONOMIC STABILITY: FOOD INSECURITY: WITHIN THE PAST 12 MONTHS, YOU WORRIED THAT YOUR FOOD WOULD RUN OUT BEFORE YOU GOT MONEY TO BUY MORE.: NEVER TRUE

## 2024-05-31 SDOH — ECONOMIC STABILITY: FOOD INSECURITY: WITHIN THE PAST 12 MONTHS, THE FOOD YOU BOUGHT JUST DIDN'T LAST AND YOU DIDN'T HAVE MONEY TO GET MORE.: NEVER TRUE

## 2024-05-31 SDOH — ECONOMIC STABILITY: INCOME INSECURITY: HOW HARD IS IT FOR YOU TO PAY FOR THE VERY BASICS LIKE FOOD, HOUSING, MEDICAL CARE, AND HEATING?: NOT HARD AT ALL

## 2024-05-31 ASSESSMENT — ENCOUNTER SYMPTOMS
NAUSEA: 0
SINUS PAIN: 1
VOICE CHANGE: 0
APNEA: 0
WHEEZING: 1
SHORTNESS OF BREATH: 0
VOMITING: 0
COUGH: 1
PHOTOPHOBIA: 0
CONSTIPATION: 0
RHINORRHEA: 0
SINUS PRESSURE: 1
DIARRHEA: 1
SORE THROAT: 1
FACIAL SWELLING: 0
BACK PAIN: 1
ABDOMINAL PAIN: 1
CHEST TIGHTNESS: 0
RECTAL PAIN: 0
ABDOMINAL DISTENTION: 0

## 2024-05-31 ASSESSMENT — PATIENT HEALTH QUESTIONNAIRE - PHQ9
SUM OF ALL RESPONSES TO PHQ QUESTIONS 1-9: 9
SUM OF ALL RESPONSES TO PHQ QUESTIONS 1-9: 9
5. POOR APPETITE OR OVEREATING: NOT AT ALL
SUM OF ALL RESPONSES TO PHQ QUESTIONS 1-9: 9
7. TROUBLE CONCENTRATING ON THINGS, SUCH AS READING THE NEWSPAPER OR WATCHING TELEVISION: NOT AT ALL
1. LITTLE INTEREST OR PLEASURE IN DOING THINGS: NOT AT ALL
3. TROUBLE FALLING OR STAYING ASLEEP: NEARLY EVERY DAY
2. FEELING DOWN, DEPRESSED OR HOPELESS: NEARLY EVERY DAY
4. FEELING TIRED OR HAVING LITTLE ENERGY: NEARLY EVERY DAY
9. THOUGHTS THAT YOU WOULD BE BETTER OFF DEAD, OR OF HURTING YOURSELF: NOT AT ALL
DEPRESSION UNABLE TO ASSESS: PT REFUSES
6. FEELING BAD ABOUT YOURSELF - OR THAT YOU ARE A FAILURE OR HAVE LET YOURSELF OR YOUR FAMILY DOWN: NOT AT ALL
10. IF YOU CHECKED OFF ANY PROBLEMS, HOW DIFFICULT HAVE THESE PROBLEMS MADE IT FOR YOU TO DO YOUR WORK, TAKE CARE OF THINGS AT HOME, OR GET ALONG WITH OTHER PEOPLE: NOT DIFFICULT AT ALL
8. MOVING OR SPEAKING SO SLOWLY THAT OTHER PEOPLE COULD HAVE NOTICED. OR THE OPPOSITE, BEING SO FIGETY OR RESTLESS THAT YOU HAVE BEEN MOVING AROUND A LOT MORE THAN USUAL: NOT AT ALL
SUM OF ALL RESPONSES TO PHQ9 QUESTIONS 1 & 2: 3
SUM OF ALL RESPONSES TO PHQ QUESTIONS 1-9: 9

## 2024-05-31 ASSESSMENT — LIFESTYLE VARIABLES
HOW MANY STANDARD DRINKS CONTAINING ALCOHOL DO YOU HAVE ON A TYPICAL DAY: PATIENT DOES NOT DRINK
HOW OFTEN DO YOU HAVE A DRINK CONTAINING ALCOHOL: NEVER

## 2024-05-31 NOTE — PATIENT INSTRUCTIONS
comprehensive review of your medical history including lifestyle, illnesses that may run in your family, and various assessments and screenings as appropriate.    After reviewing your medical record and screening and assessments performed today your provider may have ordered immunizations, labs, imaging, and/or referrals for you.  A list of these orders (if applicable) as well as your Preventive Care list are included within your After Visit Summary for your review.    Other Preventive Recommendations:    A preventive eye exam performed by an eye specialist is recommended every 1-2 years to screen for glaucoma; cataracts, macular degeneration, and other eye disorders.  A preventive dental visit is recommended every 6 months.  Try to get at least 150 minutes of exercise per week or 10,000 steps per day on a pedometer .  Order or download the FREE \"Exercise & Physical Activity: Your Everyday Guide\" from [employer]. Call 1-363.332.1512 or search [employer] online.  You need 4290-9781 mg of calcium and 0390-3033 IU of vitamin D per day. It is possible to meet your calcium requirement with diet alone, but a vitamin D supplement is usually necessary to meet this goal.  When exposed to the sun, use a sunscreen that protects against both UVA and UVB radiation with an SPF of 30 or greater. Reapply every 2 to 3 hours or after sweating, drying off with a towel, or swimming.  Always wear a seat belt when traveling in a car. Always wear a helmet when riding a bicycle or motorcycle.

## 2024-05-31 NOTE — PROGRESS NOTES
24  Marilu Marcos : 1945 Sex: female  Age: 79 y.o.    Chief Complaint   Patient presents with    Hypertension     6 month check up, due for labs and Awv     Rash     Above her eye has a rash, also under her breast  would like a referral to dermatology ,       HPI  overall chek up   Labs follow up   Meds causing a sleepiness     Review of Systems   Constitutional:  Negative for appetite change, chills, diaphoresis, fatigue, fever and unexpected weight change.   HENT:  Positive for congestion, sinus pressure, sinus pain and sore throat (started today). Negative for dental problem, ear discharge, ear pain, facial swelling, hearing loss, mouth sores, rhinorrhea, tinnitus and voice change.    Eyes:  Negative for photophobia and visual disturbance (no visual changes).   Respiratory:  Positive for cough and wheezing (on occasion   with a deep breath). Negative for apnea, chest tightness and shortness of breath.    Cardiovascular:  Negative for chest pain and palpitations.   Gastrointestinal:  Positive for abdominal pain (ostomy) and diarrhea (after bag changes liquid like stool). Negative for abdominal distention, constipation, nausea, rectal pain and vomiting.        Following with Dr Delgado    Endocrine: Negative for cold intolerance, heat intolerance and polyuria.   Genitourinary:  Positive for urgency. Negative for dysuria, frequency and hematuria.        Vaginal problems    Musculoskeletal:  Positive for arthralgias, back pain (continues and leg pain significant) and gait problem. Negative for joint swelling, myalgias, neck pain and neck stiffness.   Skin:  Negative for rash and wound.   Allergic/Immunologic: Negative for environmental allergies.   Neurological:  Negative for dizziness, tremors, seizures, syncope, facial asymmetry, speech difficulty, weakness, light-headedness, numbness and headaches.   Hematological:  Does not bruise/bleed easily.   Psychiatric/Behavioral:  Negative for agitation,

## 2024-06-02 LAB
CULTURE: NORMAL
CULTURE: NORMAL
SPECIMEN DESCRIPTION: NORMAL

## 2024-06-05 ENCOUNTER — TELEPHONE (OUTPATIENT)
Dept: FAMILY MEDICINE CLINIC | Age: 79
End: 2024-06-05

## 2024-06-05 NOTE — TELEPHONE ENCOUNTER
Patient was seen on Friday for UTI symptoms but was not prescribed anything. She is still having symptoms and would like something called [facility] [place]

## 2024-06-06 NOTE — TELEPHONE ENCOUNTER
She was not prescribed because ua showed minimal irritation and the culture was not siignificant so no sig infection was seen on our tests ,can you get more details on symtoms and maybe I will send something

## 2024-06-07 RX ORDER — NITROFURANTOIN MACROCRYSTALS 100 MG/1
100 CAPSULE ORAL 2 TIMES DAILY
Qty: 20 CAPSULE | Refills: 0 | Status: SHIPPED | OUTPATIENT
Start: 2024-06-07 | End: 2024-06-17

## 2024-06-07 NOTE — TELEPHONE ENCOUNTER
Pt states that she is still having urgency but then doesn't go much and still having burning and some lower back pain, and some nausea.

## 2024-06-21 DIAGNOSIS — M54.42 CHRONIC MIDLINE LOW BACK PAIN WITH LEFT-SIDED SCIATICA: ICD-10-CM

## 2024-06-21 DIAGNOSIS — G89.29 CHRONIC MIDLINE LOW BACK PAIN WITH LEFT-SIDED SCIATICA: ICD-10-CM

## 2024-06-21 RX ORDER — HYDROCODONE BITARTRATE AND ACETAMINOPHEN 7.5; 325 MG/1; MG/1
1 TABLET ORAL 4 TIMES DAILY
Qty: 120 TABLET | Refills: 0 | Status: SHIPPED | OUTPATIENT
Start: 2024-06-21 | End: 2024-07-21

## 2024-06-21 NOTE — TELEPHONE ENCOUNTER
Last Appointment:  5/31/2024  Future Appointments   Date Time Provider Department Center   9/13/2024  1:00 PM Juan Carlos Carter DO E. [facility]   6/6/2025  9:30 AM Juan Carlos Carter DO E. [facility]

## 2024-06-24 DIAGNOSIS — F51.01 PRIMARY INSOMNIA: ICD-10-CM

## 2024-06-24 DIAGNOSIS — F41.9 ANXIETY: ICD-10-CM

## 2024-06-24 RX ORDER — LORAZEPAM 0.5 MG/1
1 TABLET ORAL NIGHTLY
Qty: 60 TABLET | Refills: 2 | Status: SHIPPED | OUTPATIENT
Start: 2024-06-24 | End: 2024-09-22

## 2024-06-24 NOTE — TELEPHONE ENCOUNTER
Patients last appointment 5/31/2024.  Patients next scheduled appointment   Future Appointments   Date Time Provider Department Center   9/13/2024  1:00 PM Juan Carlos Carter DO E. [facility]   6/6/2025  9:30 AM Juan Carlos Carter DO E. [facility]

## 2024-06-25 RX ORDER — ZOLPIDEM TARTRATE 5 MG/1
TABLET ORAL
Qty: 30 TABLET | Refills: 2 | Status: SHIPPED | OUTPATIENT
Start: 2024-06-25 | End: 2024-07-25

## 2024-06-25 RX ORDER — DICYCLOMINE HCL 20 MG
TABLET ORAL
Qty: 60 TABLET | Refills: 1 | Status: SHIPPED | OUTPATIENT
Start: 2024-06-25

## 2024-07-01 RX ORDER — METOPROLOL TARTRATE 50 MG/1
50 TABLET, FILM COATED ORAL DAILY
Qty: 90 TABLET | Refills: 1 | Status: SHIPPED
Start: 2024-07-01 | End: 2024-07-03 | Stop reason: SDUPTHER

## 2024-07-05 RX ORDER — ASPIRIN 325 MG
325 TABLET, DELAYED RELEASE (ENTERIC COATED) ORAL 2 TIMES DAILY
Qty: 30 TABLET | Refills: 3 | Status: SHIPPED | OUTPATIENT
Start: 2024-07-05

## 2024-07-05 RX ORDER — METOPROLOL TARTRATE 50 MG/1
50 TABLET, FILM COATED ORAL DAILY
Qty: 90 TABLET | Refills: 1 | Status: SHIPPED | OUTPATIENT
Start: 2024-07-05

## 2024-07-24 DIAGNOSIS — G89.29 CHRONIC MIDLINE LOW BACK PAIN WITH LEFT-SIDED SCIATICA: Primary | ICD-10-CM

## 2024-07-24 DIAGNOSIS — M54.42 CHRONIC MIDLINE LOW BACK PAIN WITH LEFT-SIDED SCIATICA: Primary | ICD-10-CM

## 2024-07-24 RX ORDER — HYDROCODONE BITARTRATE AND ACETAMINOPHEN 7.5; 325 MG/1; MG/1
1 TABLET ORAL 4 TIMES DAILY
Qty: 120 TABLET | Refills: 0 | Status: SHIPPED | OUTPATIENT
Start: 2024-07-24 | End: 2024-08-23

## 2024-08-02 RX ORDER — DICYCLOMINE HCL 20 MG
TABLET ORAL
Qty: 60 TABLET | Refills: 1 | Status: SHIPPED | OUTPATIENT
Start: 2024-08-02

## 2024-08-02 NOTE — TELEPHONE ENCOUNTER
Patients last appointment 5/31/2024.  Patients next scheduled appointment   Future Appointments   Date Time Provider Department Center   9/13/2024  1:00 PM Juan Carlos Carter DO E. PAL [facility] DEP   6/6/2025  9:30 AM Juan Carlos Carter DO E. PAL [facility] DEP

## 2024-08-20 DIAGNOSIS — M54.42 CHRONIC MIDLINE LOW BACK PAIN WITH LEFT-SIDED SCIATICA: ICD-10-CM

## 2024-08-20 DIAGNOSIS — G89.29 CHRONIC MIDLINE LOW BACK PAIN WITH LEFT-SIDED SCIATICA: ICD-10-CM

## 2024-08-20 RX ORDER — HYDROCODONE BITARTRATE AND ACETAMINOPHEN 7.5; 325 MG/1; MG/1
1 TABLET ORAL 4 TIMES DAILY
Qty: 120 TABLET | Refills: 0 | Status: SHIPPED | OUTPATIENT
Start: 2024-08-20 | End: 2024-09-19

## 2024-08-20 NOTE — TELEPHONE ENCOUNTER
Last Appointment:  5/31/2024  Future Appointments   Date Time Provider Department Center   9/13/2024  1:00 PM Juan Carlos Carter DO E. [facility] DEP   6/6/2025  9:30 AM Juan Carlos Carter DO E. [facility] DEP

## 2024-08-30 RX ORDER — OMEPRAZOLE 40 MG/1
40 CAPSULE, DELAYED RELEASE ORAL DAILY
Qty: 90 CAPSULE | Refills: 1 | Status: SHIPPED | OUTPATIENT
Start: 2024-08-30

## 2024-08-30 RX ORDER — HYDROXYUREA 500 MG/1
500 CAPSULE ORAL DAILY
Qty: 90 CAPSULE | Refills: 1 | Status: SHIPPED | OUTPATIENT
Start: 2024-08-30 | End: 2025-02-26

## 2024-08-30 NOTE — TELEPHONE ENCOUNTER
Patients last appointment 5/31/2024.  Patients next scheduled appointment   Future Appointments   Date Time Provider Department Center   9/13/2024  1:00 PM Juan Carlos Carter DO E. [facility] DE[facility]   6/6/2025  9:30 AM Juan Carlos Carter DO [facility] DE[facility]

## 2024-09-16 DIAGNOSIS — M54.42 CHRONIC MIDLINE LOW BACK PAIN WITH LEFT-SIDED SCIATICA: ICD-10-CM

## 2024-09-16 DIAGNOSIS — G89.29 CHRONIC MIDLINE LOW BACK PAIN WITH LEFT-SIDED SCIATICA: ICD-10-CM

## 2024-09-16 RX ORDER — HYDROCODONE BITARTRATE AND ACETAMINOPHEN 7.5; 325 MG/1; MG/1
1 TABLET ORAL 4 TIMES DAILY
Qty: 120 TABLET | Refills: 0 | Status: SHIPPED | OUTPATIENT
Start: 2024-09-16 | End: 2024-10-16

## 2024-10-03 DIAGNOSIS — F51.01 PRIMARY INSOMNIA: ICD-10-CM

## 2024-10-03 RX ORDER — FUROSEMIDE 20 MG
20 TABLET ORAL DAILY
Qty: 90 TABLET | Refills: 1 | Status: SHIPPED | OUTPATIENT
Start: 2024-10-03 | End: 2025-04-01

## 2024-10-03 RX ORDER — ZOLPIDEM TARTRATE 5 MG/1
TABLET ORAL
Qty: 30 TABLET | Refills: 2 | Status: SHIPPED | OUTPATIENT
Start: 2024-10-03 | End: 2024-11-02

## 2024-10-03 RX ORDER — ASPIRIN 325 MG
325 TABLET, DELAYED RELEASE (ENTERIC COATED) ORAL 2 TIMES DAILY
Qty: 30 TABLET | Refills: 5 | Status: SHIPPED | OUTPATIENT
Start: 2024-10-03

## 2024-10-03 NOTE — TELEPHONE ENCOUNTER
Patients last appointment 5/31/2024.  Patients next scheduled appointment   Future Appointments   Date Time Provider Department Center   10/11/2024  2:00 PM Juan Carlos Carter DO E. PAL [facility] DEP   6/6/2025  9:30 AM Juan Carlos Carter DO E. [facility] DEP

## 2024-10-14 DIAGNOSIS — F41.9 ANXIETY: ICD-10-CM

## 2024-10-14 DIAGNOSIS — G89.29 CHRONIC MIDLINE LOW BACK PAIN WITH LEFT-SIDED SCIATICA: ICD-10-CM

## 2024-10-14 DIAGNOSIS — G57.93 NEUROPATHIC PAIN OF BOTH LEGS: Primary | ICD-10-CM

## 2024-10-14 DIAGNOSIS — F51.01 PRIMARY INSOMNIA: ICD-10-CM

## 2024-10-14 DIAGNOSIS — M54.42 CHRONIC MIDLINE LOW BACK PAIN WITH LEFT-SIDED SCIATICA: ICD-10-CM

## 2024-10-14 RX ORDER — HYDROCODONE BITARTRATE AND ACETAMINOPHEN 7.5; 325 MG/1; MG/1
1 TABLET ORAL 4 TIMES DAILY
Qty: 120 TABLET | Refills: 0 | Status: SHIPPED | OUTPATIENT
Start: 2024-10-14 | End: 2024-11-13

## 2024-10-14 RX ORDER — LORAZEPAM 0.5 MG/1
0.5 TABLET ORAL 2 TIMES DAILY
Qty: 180 TABLET | Refills: 0 | Status: SHIPPED | OUTPATIENT
Start: 2024-10-14 | End: 2025-01-12

## 2024-10-14 RX ORDER — LORAZEPAM 0.5 MG/1
0.5 TABLET ORAL 2 TIMES DAILY
COMMUNITY
Start: 2024-09-16 | End: 2024-10-14 | Stop reason: SDUPTHER

## 2024-10-14 NOTE — TELEPHONE ENCOUNTER
Refill requested     Thank you    Last Appointment:  5/31/2024  Future Appointments   Date Time Provider Department Center   10/28/2024  1:30 PM Juan Carlos Carter DO E. [facility] DEP   6/6/2025  9:30 AM Juan Carlos Carter DO E. [facility] DEP

## 2024-10-30 RX ORDER — SIMVASTATIN 40 MG
40 TABLET ORAL NIGHTLY
Qty: 90 TABLET | Refills: 1 | Status: SHIPPED | OUTPATIENT
Start: 2024-10-30 | End: 2025-04-28

## 2024-10-30 RX ORDER — DICYCLOMINE HCL 20 MG
TABLET ORAL
Qty: 60 TABLET | Refills: 1 | Status: SHIPPED | OUTPATIENT
Start: 2024-10-30

## 2024-10-30 NOTE — TELEPHONE ENCOUNTER
Name of Medication(s) Requested:  Requested Prescriptions     Pending Prescriptions Disp Refills    simvastatin (ZOCOR) 40 MG tablet 90 tablet 1     Sig: Take 1 tablet by mouth nightly    dicyclomine (BENTYL) 20 MG tablet 60 tablet 1     Sig: TAKE ONE TABLET BY MOUTH TWICE A DAY AS NEEDED (IBS)       Medication is on current medication list Yes    Dosage and directions were verified? Yes    Quantity verified: 90 day supply     Pharmacy Verified?  Yes    Last Appointment:  5/31/2024    Future appts:  Future Appointments   Date Time Provider Department Center   11/4/2024  3:00 PM Juan Carlos Carter DO E. PAL [facility] DEP   6/6/2025  9:30 AM Juan Carlos Carter DO E. PAL [facility] DEP        (If no appt send self scheduling link. .REFILLAPPT)  Scheduling request sent?     [] Yes  [x] No    Does patient need updated?  [] Yes  [x] No

## 2024-11-08 DIAGNOSIS — G89.29 CHRONIC MIDLINE LOW BACK PAIN WITH LEFT-SIDED SCIATICA: ICD-10-CM

## 2024-11-08 DIAGNOSIS — M54.42 CHRONIC MIDLINE LOW BACK PAIN WITH LEFT-SIDED SCIATICA: ICD-10-CM

## 2024-11-08 RX ORDER — HYDROCODONE BITARTRATE AND ACETAMINOPHEN 7.5; 325 MG/1; MG/1
1 TABLET ORAL 4 TIMES DAILY
Qty: 120 TABLET | Refills: 0 | Status: SHIPPED | OUTPATIENT
Start: 2024-11-08 | End: 2024-12-08

## 2024-11-08 NOTE — TELEPHONE ENCOUNTER
Name of Medication(s) Requested:  Requested Prescriptions     Pending Prescriptions Disp Refills    HYDROcodone-acetaminophen (NORCO) 7.5-325 MG per tablet 120 tablet 0     Sig: Take 1 tablet by mouth 4 times daily for 30 days. Max Daily Amount: 4 tablets       Medication is on current medication list Yes    Dosage and directions were verified? Yes    Quantity verified: 30 day supply     Pharmacy Verified?  Yes    Last Appointment:  5/31/2024    Future appts:  Future Appointments   Date Time Provider Department Center   11/11/2024  1:00 PM Juan Carlos Carter DO E. PAL [facility] DEP   6/6/2025  9:30 AM Juan Carlos Carter DO E. PAL [facility] DEP        (If no appt send self scheduling link. .REFILLAPPT)  Scheduling request sent?     [] Yes  [] No    Does patient need updated?  [] Yes  [] No

## 2024-12-06 ENCOUNTER — OFFICE VISIT (OUTPATIENT)
Dept: FAMILY MEDICINE CLINIC | Age: 79
End: 2024-12-06
Payer: MEDICARE

## 2024-12-06 VITALS
WEIGHT: 118.4 LBS | HEART RATE: 51 BPM | BODY MASS INDEX: 20.22 KG/M2 | RESPIRATION RATE: 16 BRPM | HEIGHT: 64 IN | TEMPERATURE: 97.8 F | OXYGEN SATURATION: 100 % | SYSTOLIC BLOOD PRESSURE: 138 MMHG | DIASTOLIC BLOOD PRESSURE: 78 MMHG

## 2024-12-06 DIAGNOSIS — Z43.2 ATTENTION TO ILEOSTOMY (HCC): ICD-10-CM

## 2024-12-06 DIAGNOSIS — E55.9 VITAMIN D DEFICIENCY: ICD-10-CM

## 2024-12-06 DIAGNOSIS — G89.29 CHRONIC MIDLINE LOW BACK PAIN WITH LEFT-SIDED SCIATICA: ICD-10-CM

## 2024-12-06 DIAGNOSIS — E11.21 TYPE 2 DIABETES MELLITUS WITH DIABETIC NEPHROPATHY, WITH LONG-TERM CURRENT USE OF INSULIN (HCC): ICD-10-CM

## 2024-12-06 DIAGNOSIS — F41.9 ANXIETY: ICD-10-CM

## 2024-12-06 DIAGNOSIS — E78.49 OTHER HYPERLIPIDEMIA: ICD-10-CM

## 2024-12-06 DIAGNOSIS — Z79.4 TYPE 2 DIABETES MELLITUS WITH DIABETIC NEPHROPATHY, WITH LONG-TERM CURRENT USE OF INSULIN (HCC): ICD-10-CM

## 2024-12-06 DIAGNOSIS — M54.42 CHRONIC MIDLINE LOW BACK PAIN WITH LEFT-SIDED SCIATICA: ICD-10-CM

## 2024-12-06 DIAGNOSIS — M81.0 OSTEOPOROSIS, UNSPECIFIED OSTEOPOROSIS TYPE, UNSPECIFIED PATHOLOGICAL FRACTURE PRESENCE: ICD-10-CM

## 2024-12-06 DIAGNOSIS — E78.5 HYPERLIPIDEMIA, UNSPECIFIED HYPERLIPIDEMIA TYPE: ICD-10-CM

## 2024-12-06 DIAGNOSIS — E78.5 DYSLIPIDEMIA: ICD-10-CM

## 2024-12-06 DIAGNOSIS — G57.93 NEUROPATHIC PAIN OF BOTH LEGS: Primary | ICD-10-CM

## 2024-12-06 DIAGNOSIS — I25.119 CORONARY ARTERY DISEASE INVOLVING NATIVE CORONARY ARTERY WITH ANGINA PECTORIS, UNSPECIFIED WHETHER NATIVE OR TRANSPLANTED HEART (HCC): ICD-10-CM

## 2024-12-06 PROCEDURE — 1159F MED LIST DOCD IN RCRD: CPT | Performed by: FAMILY MEDICINE

## 2024-12-06 PROCEDURE — 1123F ACP DISCUSS/DSCN MKR DOCD: CPT | Performed by: FAMILY MEDICINE

## 2024-12-06 PROCEDURE — 1036F TOBACCO NON-USER: CPT | Performed by: FAMILY MEDICINE

## 2024-12-06 PROCEDURE — 3075F SYST BP GE 130 - 139MM HG: CPT | Performed by: FAMILY MEDICINE

## 2024-12-06 PROCEDURE — G8484 FLU IMMUNIZE NO ADMIN: HCPCS | Performed by: FAMILY MEDICINE

## 2024-12-06 PROCEDURE — 3078F DIAST BP <80 MM HG: CPT | Performed by: FAMILY MEDICINE

## 2024-12-06 PROCEDURE — 99214 OFFICE O/P EST MOD 30 MIN: CPT | Performed by: FAMILY MEDICINE

## 2024-12-06 PROCEDURE — G8427 DOCREV CUR MEDS BY ELIG CLIN: HCPCS | Performed by: FAMILY MEDICINE

## 2024-12-06 PROCEDURE — G8420 CALC BMI NORM PARAMETERS: HCPCS | Performed by: FAMILY MEDICINE

## 2024-12-06 PROCEDURE — 1160F RVW MEDS BY RX/DR IN RCRD: CPT | Performed by: FAMILY MEDICINE

## 2024-12-06 PROCEDURE — G8400 PT W/DXA NO RESULTS DOC: HCPCS | Performed by: FAMILY MEDICINE

## 2024-12-06 PROCEDURE — 1090F PRES/ABSN URINE INCON ASSESS: CPT | Performed by: FAMILY MEDICINE

## 2024-12-06 PROCEDURE — 3044F HG A1C LEVEL LT 7.0%: CPT | Performed by: FAMILY MEDICINE

## 2024-12-06 RX ORDER — FOLIC ACID 1 MG/1
1 TABLET ORAL DAILY
Qty: 90 TABLET | Refills: 1 | Status: SHIPPED | OUTPATIENT
Start: 2024-12-06 | End: 2025-06-04

## 2024-12-06 RX ORDER — HYDROCODONE BITARTRATE AND ACETAMINOPHEN 7.5; 325 MG/1; MG/1
1 TABLET ORAL 4 TIMES DAILY
Qty: 120 TABLET | Refills: 0 | Status: SHIPPED | OUTPATIENT
Start: 2024-12-06 | End: 2025-01-05

## 2024-12-06 RX ORDER — CITALOPRAM HYDROBROMIDE 20 MG/1
20 TABLET ORAL DAILY
Qty: 90 TABLET | Refills: 1 | Status: CANCELLED | OUTPATIENT
Start: 2024-12-06

## 2024-12-06 NOTE — PROGRESS NOTES
24  Marilu Marcos : 1945 Sex: female  Age: 79 y.o.    Chief Complaint   Patient presents with    Follow-up     Medication check, discuss medications, has had 3-4 falls this year       HPI  overall check up     Review of Systems   Constitutional:  Negative for appetite change, chills, diaphoresis, fatigue, fever and unexpected weight change.   HENT:  Positive for congestion, sinus pressure and sinus pain. Negative for dental problem, ear discharge, ear pain, facial swelling, hearing loss, mouth sores, rhinorrhea, tinnitus and voice change. Sore throat: started today.   Eyes:  Negative for photophobia and visual disturbance (no visual changes).   Respiratory:  Positive for cough. Negative for apnea, chest tightness and shortness of breath. Wheezing: on occasion   with a deep breath.   Cardiovascular:  Negative for palpitations.   Gastrointestinal:  Positive for abdominal pain (ostomy) and diarrhea (after bag changes liquid like stool). Negative for abdominal distention, constipation, nausea, rectal pain and vomiting.        Following with Dr Delgado    Endocrine: Negative for cold intolerance, heat intolerance and polyuria.   Genitourinary:  Positive for urgency. Negative for dysuria, frequency and hematuria.        Vaginal problems    Musculoskeletal:  Positive for arthralgias, back pain (continues and leg pain significant) and gait problem. Negative for joint swelling, myalgias, neck pain and neck stiffness.   Skin:  Negative for rash and wound.   Allergic/Immunologic: Negative for environmental allergies.   Neurological:  Negative for dizziness, tremors, seizures, syncope, facial asymmetry, speech difficulty, weakness, light-headedness, numbness and headaches.   Hematological:  Does not bruise/bleed easily.   Psychiatric/Behavioral:  Negative for agitation, hallucinations and suicidal ideas. The patient is not nervous/anxious.          Physical Exam  Vitals reviewed.   Constitutional:       General:

## 2024-12-20 NOTE — TELEPHONE ENCOUNTER
Name of Medication(s) Requested:  Requested Prescriptions     Pending Prescriptions Disp Refills    diclofenac sodium (VOLTAREN) 1 %  g 5     Sig: Apply 2 g topically 2 times daily       Medication is on current medication list Yes    Dosage and directions were verified? Yes    Quantity verified: 90 day supply     Pharmacy Verified?  Yes    Last Appointment:  12/6/2024    Future appts:  Future Appointments   Date Time Provider Department Center   3/7/2025  1:00 PM Juan Carlos Carter DO E. PAL [facility] DEP   6/6/2025  9:30 AM Juan Carlos Carter DO E. PAL [facility] DEP        (If no appt send self scheduling link. .REFILLAPPT)  Scheduling request sent?     [] Yes  [x] No    Does patient need updated?  [] Yes  [x] No

## 2024-12-30 DIAGNOSIS — M54.42 CHRONIC MIDLINE LOW BACK PAIN WITH LEFT-SIDED SCIATICA: ICD-10-CM

## 2024-12-30 DIAGNOSIS — G89.29 CHRONIC MIDLINE LOW BACK PAIN WITH LEFT-SIDED SCIATICA: ICD-10-CM

## 2024-12-30 RX ORDER — HYDROCODONE BITARTRATE AND ACETAMINOPHEN 7.5; 325 MG/1; MG/1
1 TABLET ORAL 4 TIMES DAILY
Qty: 120 TABLET | Refills: 0 | Status: SHIPPED | OUTPATIENT
Start: 2024-12-30 | End: 2025-01-29

## 2024-12-30 NOTE — TELEPHONE ENCOUNTER
Name of Medication(s) Requested:  Requested Prescriptions     Pending Prescriptions Disp Refills    HYDROcodone-acetaminophen (NORCO) 7.5-325 MG per tablet 120 tablet 0     Sig: Take 1 tablet by mouth 4 times daily for 30 days. Max Daily Amount: 4 tablets       Medication is on current medication list Yes    Dosage and directions were verified? Yes    Quantity verified: 30 day supply     Pharmacy Verified?  Yes    Last Appointment:  12/6/2024    Future appts:  Future Appointments   Date Time Provider Department Center   3/7/2025  1:00 PM Juan Carlos Carter DO E. PAL [facility] DEP   6/6/2025  9:30 AM Juan Carlos Carter DO E. [facility] DEP        (If no appt send self scheduling link. .REFILLAPPT)  Scheduling request sent?     [] Yes  [x] No    Does patient need updated?  [] Yes  [x] No

## 2025-01-03 DIAGNOSIS — F51.01 PRIMARY INSOMNIA: ICD-10-CM

## 2025-01-03 DIAGNOSIS — F41.9 ANXIETY: ICD-10-CM

## 2025-01-03 RX ORDER — METOPROLOL TARTRATE 50 MG
50 TABLET ORAL DAILY
Qty: 90 TABLET | Refills: 1 | Status: SHIPPED | OUTPATIENT
Start: 2025-01-03

## 2025-01-03 RX ORDER — LORAZEPAM 0.5 MG/1
0.5 TABLET ORAL 2 TIMES DAILY
Qty: 180 TABLET | Refills: 0 | Status: SHIPPED | OUTPATIENT
Start: 2025-01-03 | End: 2025-04-03

## 2025-01-03 RX ORDER — ZOLPIDEM TARTRATE 5 MG/1
TABLET ORAL
Qty: 30 TABLET | Refills: 2 | Status: SHIPPED | OUTPATIENT
Start: 2025-01-03 | End: 2025-02-02

## 2025-01-03 NOTE — TELEPHONE ENCOUNTER
Name of Medication(s) Requested:  Requested Prescriptions     Pending Prescriptions Disp Refills    zolpidem (AMBIEN) 5 MG tablet 30 tablet 2     Sig: take 1 tablet by mouth nightly    LORazepam (ATIVAN) 0.5 MG tablet 180 tablet 0     Sig: Take 1 tablet by mouth 2 times daily for 90 days. Max Daily Amount: 1 mg    metoprolol tartrate (LOPRESSOR) 50 MG tablet 90 tablet 1     Sig: Take 1 tablet by mouth daily       Medication is on current medication list Yes    Dosage and directions were verified? Yes    Quantity verified: 30 day supply     Pharmacy Verified?  Yes    Last Appointment:  12/6/2024    Future appts:  Future Appointments   Date Time Provider Department Center   3/7/2025  1:00 PM Juan Carlos Carter DO E. PAL [facility] DEP   6/6/2025  9:30 AM Juan Carlos Carter DO E. PAL [facility] DEP        (If no appt send self scheduling link. .REFILLAPPT)  Scheduling request sent?     [] Yes  [x] No    Does patient need updated?  [] Yes  [x] No

## 2025-01-08 RX ORDER — DICYCLOMINE HCL 20 MG
TABLET ORAL
Qty: 60 TABLET | Refills: 1 | Status: SHIPPED | OUTPATIENT
Start: 2025-01-08

## 2025-01-08 NOTE — TELEPHONE ENCOUNTER
Received  call from pt stating ou wanted her to take potasium and magnesium, wants to know how much and if you can send a script for it. She is still having pain from her sciatica and would like a pain med also.

## 2025-01-08 NOTE — TELEPHONE ENCOUNTER
Name of Medication(s) Requested:  Requested Prescriptions     Pending Prescriptions Disp Refills    dicyclomine (BENTYL) 20 MG tablet [Pharmacy Med Name: DICYCLOMINE HYDROCHLORIDE 20MG TABLET] 60 tablet 1     Sig: TAKE ONE TABLET BY MOUTH TWICE A DAY AS NEEDED       Medication is on current medication list Yes    Dosage and directions were verified? Yes    Quantity verified: 30 day supply     Pharmacy Verified?  Yes    Last Appointment:  12/6/2024    Future appts:  Future Appointments   Date Time Provider Department Center   3/7/2025  1:00 PM Juan Carlos Carter DO E. PAL [facility] DEP   6/6/2025  9:30 AM Juan Carlos Carter DO E. PAL [facility] DEP        (If no appt send self scheduling link. .REFILLAPPT)  Scheduling request sent?     [] Yes  [x] No    Does patient need updated?  [] Yes  [x] No

## 2025-01-22 DIAGNOSIS — M54.42 CHRONIC MIDLINE LOW BACK PAIN WITH LEFT-SIDED SCIATICA: ICD-10-CM

## 2025-01-22 DIAGNOSIS — F51.01 PRIMARY INSOMNIA: ICD-10-CM

## 2025-01-22 DIAGNOSIS — G89.29 CHRONIC MIDLINE LOW BACK PAIN WITH LEFT-SIDED SCIATICA: ICD-10-CM

## 2025-01-22 RX ORDER — ZOLPIDEM TARTRATE 5 MG/1
TABLET ORAL
Qty: 30 TABLET | Refills: 2 | Status: SHIPPED | OUTPATIENT
Start: 2025-01-22 | End: 2025-02-21

## 2025-01-22 RX ORDER — HYDROCODONE BITARTRATE AND ACETAMINOPHEN 7.5; 325 MG/1; MG/1
1 TABLET ORAL 4 TIMES DAILY
Qty: 120 TABLET | Refills: 0 | Status: SHIPPED | OUTPATIENT
Start: 2025-01-22 | End: 2025-02-21

## 2025-01-22 NOTE — TELEPHONE ENCOUNTER
Name of Medication(s) Requested:  Requested Prescriptions     Pending Prescriptions Disp Refills    zolpidem (AMBIEN) 5 MG tablet 30 tablet 2     Sig: take 1 tablet by mouth nightly    HYDROcodone-acetaminophen (NORCO) 7.5-325 MG per tablet 120 tablet 0     Sig: Take 1 tablet by mouth 4 times daily for 30 days. Max Daily Amount: 4 tablets       Medication is on current medication list Yes    Dosage and directions were verified? Yes    Quantity verified: 30 day supply     Pharmacy Verified?  Yes    Last Appointment:  12/6/2024    Future appts:  Future Appointments   Date Time Provider Department Center   3/7/2025  1:00 PM Juan Carlos Carter DO E. PAL [facility] DEP   6/6/2025  9:30 AM Juan Carlos Carter DO E. PAL [facility] DEP        (If no appt send self scheduling link. .REFILLAPPT)  Scheduling request sent?     [] Yes  [x] No    Does patient need updated?  [] Yes  [x] No

## 2025-01-28 DIAGNOSIS — F51.01 PRIMARY INSOMNIA: ICD-10-CM

## 2025-01-28 DIAGNOSIS — M54.42 CHRONIC MIDLINE LOW BACK PAIN WITH LEFT-SIDED SCIATICA: ICD-10-CM

## 2025-01-28 DIAGNOSIS — G89.29 CHRONIC MIDLINE LOW BACK PAIN WITH LEFT-SIDED SCIATICA: ICD-10-CM

## 2025-01-28 RX ORDER — ZOLPIDEM TARTRATE 5 MG/1
TABLET ORAL
Qty: 30 TABLET | Refills: 2 | OUTPATIENT
Start: 2025-01-28 | End: 2025-02-27

## 2025-01-28 RX ORDER — HYDROCODONE BITARTRATE AND ACETAMINOPHEN 7.5; 325 MG/1; MG/1
1 TABLET ORAL 4 TIMES DAILY
Qty: 120 TABLET | Refills: 0 | OUTPATIENT
Start: 2025-01-28 | End: 2025-02-27

## 2025-02-27 DIAGNOSIS — G89.29 CHRONIC MIDLINE LOW BACK PAIN WITH LEFT-SIDED SCIATICA: ICD-10-CM

## 2025-02-27 DIAGNOSIS — F41.9 ANXIETY: ICD-10-CM

## 2025-02-27 DIAGNOSIS — M54.42 CHRONIC MIDLINE LOW BACK PAIN WITH LEFT-SIDED SCIATICA: ICD-10-CM

## 2025-02-27 RX ORDER — HYDROCODONE BITARTRATE AND ACETAMINOPHEN 7.5; 325 MG/1; MG/1
1 TABLET ORAL 4 TIMES DAILY
Qty: 120 TABLET | Refills: 0 | Status: SHIPPED | OUTPATIENT
Start: 2025-02-27 | End: 2025-03-29

## 2025-02-27 RX ORDER — LORAZEPAM 0.5 MG/1
0.5 TABLET ORAL 2 TIMES DAILY
Qty: 60 TABLET | Refills: 1 | Status: SHIPPED | OUTPATIENT
Start: 2025-02-27 | End: 2025-03-29

## 2025-02-27 NOTE — TELEPHONE ENCOUNTER
Name of Medication(s) Requested:  Requested Prescriptions      No prescriptions requested or ordered in this encounter       Medication is on current medication list Yes    Dosage and directions were verified? Yes    Quantity verified: 30 day supply     Pharmacy Verified?  Yes    Last Appointment:  12/6/2024    Future appts:  Future Appointments   Date Time Provider Department Center   3/7/2025  1:00 PM Juan Carlos Crater DO E. PAL [facility]   6/6/2025  9:30 AM Juan Carlos Carter DO E. PAL [facility]        (If no appt send self scheduling link. .REFILLAPPT)  Scheduling request sent?     [] Yes  [x] No    Does patient need updated?  [] Yes  [x] No

## 2025-02-27 NOTE — TELEPHONE ENCOUNTER
Name of Medication(s) Requested:  Requested Prescriptions     Pending Prescriptions Disp Refills    LORazepam (ATIVAN) 0.5 MG tablet [Pharmacy Med Name: LORAZEPAM 0.5MG TABLET] 180 tablet 0     Sig: Take 1 tablet by mouth 2 times daily. Max Daily Amount: 1 mg       Medication is on current medication list Yes    Dosage and directions were verified? Yes    Quantity verified: 30 day supply     Pharmacy Verified?  Yes    Last Appointment:  Visit date not found    Future appts:  Future Appointments   Date Time Provider Department Center   3/7/2025  1:00 PM Juan Carlos Carter DO E. PAL [facility]   6/6/2025  9:30 AM Juan Carlos Carter DO E. PAL [facility] DEP        (If no appt send self scheduling link. .REFILLAPPT)  Scheduling request sent?     [] Yes  [x] No    Does patient need updated?  [] Yes  [x] No

## 2025-03-04 DIAGNOSIS — F41.9 ANXIETY: ICD-10-CM

## 2025-03-05 RX ORDER — LORAZEPAM 0.5 MG/1
0.5 TABLET ORAL 2 TIMES DAILY
Qty: 60 TABLET | Refills: 0 | OUTPATIENT
Start: 2025-03-05 | End: 2025-04-04

## 2025-03-05 NOTE — TELEPHONE ENCOUNTER
Name of Medication(s) Requested:  Requested Prescriptions     Pending Prescriptions Disp Refills    LORazepam (ATIVAN) 0.5 MG tablet [Pharmacy Med Name: LORAZEPAM 0.5MG TABLET] 60 tablet 0     Sig: Take 1 tablet by mouth 2 times daily for 30 days. Max Daily Amount: 1 mg       Medication is on current medication list Yes    Dosage and directions were verified? Yes    Quantity verified: 30 day supply     Pharmacy Verified?  Yes    Last Appointment:  Visit date not found    Future appts:  Future Appointments   Date Time Provider Department Center   3/7/2025  1:00 PM Juan Carlos Carter DO E. PAL [facility] DEP   6/6/2025  9:30 AM Juan Carlos Carter DO E. PAL [facility] DEP        (If no appt send self scheduling link. .REFILLAPPT)  Scheduling request sent?     [] Yes  [x] No    Does patient need updated?  [] Yes  [x] No

## 2025-03-21 DIAGNOSIS — F41.9 ANXIETY: ICD-10-CM

## 2025-03-21 RX ORDER — LORAZEPAM 0.5 MG/1
0.5 TABLET ORAL 2 TIMES DAILY
Qty: 60 TABLET | Refills: 0 | Status: SHIPPED | OUTPATIENT
Start: 2025-03-21 | End: 2025-04-20

## 2025-03-21 NOTE — TELEPHONE ENCOUNTER
Patient called and requested a 3 month supply of the Ativan.  Please advise.            Name of Medication(s) Requested:  Requested Prescriptions     Pending Prescriptions Disp Refills    LORazepam (ATIVAN) 0.5 MG tablet       Sig: Take 1 tablet by mouth 2 times daily for 30 days. Max Daily Amount: 1 mg       Medication is on current medication list Yes    Dosage and directions were verified? Yes    Quantity verified: 30 day supply     Pharmacy Verified?  Yes    Last Appointment:  12/6/2024    Future appts:  Future Appointments   Date Time Provider Department Center   3/24/2025  1:30 PM Juan Carlos Carter DO E. PAL [facility] DEP   6/6/2025  9:30 AM Juan Carlos Carter DO E. PAL [facility] DEP        (If no appt send self scheduling link. .REFILLAPPT)  Scheduling request sent?     [] Yes  [x] No    Does patient need updated?  [] Yes  [x] No

## 2025-03-24 ENCOUNTER — OFFICE VISIT (OUTPATIENT)
Dept: FAMILY MEDICINE CLINIC | Age: 80
End: 2025-03-24

## 2025-03-24 VITALS
DIASTOLIC BLOOD PRESSURE: 60 MMHG | SYSTOLIC BLOOD PRESSURE: 117 MMHG | HEIGHT: 64 IN | TEMPERATURE: 97.6 F | BODY MASS INDEX: 20.04 KG/M2 | OXYGEN SATURATION: 98 % | WEIGHT: 117.4 LBS | RESPIRATION RATE: 16 BRPM | HEART RATE: 53 BPM

## 2025-03-24 DIAGNOSIS — I25.119 CORONARY ARTERY DISEASE INVOLVING NATIVE CORONARY ARTERY WITH ANGINA PECTORIS, UNSPECIFIED WHETHER NATIVE OR TRANSPLANTED HEART: ICD-10-CM

## 2025-03-24 DIAGNOSIS — M81.0 OSTEOPOROSIS, UNSPECIFIED OSTEOPOROSIS TYPE, UNSPECIFIED PATHOLOGICAL FRACTURE PRESENCE: ICD-10-CM

## 2025-03-24 DIAGNOSIS — E78.49 OTHER HYPERLIPIDEMIA: ICD-10-CM

## 2025-03-24 DIAGNOSIS — E11.21 TYPE 2 DIABETES MELLITUS WITH DIABETIC NEPHROPATHY, WITH LONG-TERM CURRENT USE OF INSULIN (HCC): ICD-10-CM

## 2025-03-24 DIAGNOSIS — M54.42 CHRONIC MIDLINE LOW BACK PAIN WITH LEFT-SIDED SCIATICA: ICD-10-CM

## 2025-03-24 DIAGNOSIS — F41.9 ANXIETY: ICD-10-CM

## 2025-03-24 DIAGNOSIS — E78.5 DYSLIPIDEMIA: ICD-10-CM

## 2025-03-24 DIAGNOSIS — Z43.2 ATTENTION TO ILEOSTOMY (HCC): ICD-10-CM

## 2025-03-24 DIAGNOSIS — E78.5 HYPERLIPIDEMIA, UNSPECIFIED HYPERLIPIDEMIA TYPE: ICD-10-CM

## 2025-03-24 DIAGNOSIS — Z79.4 TYPE 2 DIABETES MELLITUS WITH DIABETIC NEPHROPATHY, WITH LONG-TERM CURRENT USE OF INSULIN (HCC): ICD-10-CM

## 2025-03-24 DIAGNOSIS — G57.93 NEUROPATHIC PAIN OF BOTH LEGS: ICD-10-CM

## 2025-03-24 DIAGNOSIS — E55.9 VITAMIN D DEFICIENCY: ICD-10-CM

## 2025-03-24 DIAGNOSIS — G89.29 CHRONIC MIDLINE LOW BACK PAIN WITH LEFT-SIDED SCIATICA: ICD-10-CM

## 2025-03-24 LAB
BASOPHILS ABSOLUTE: 0 K/UL (ref 0–0.2)
BASOPHILS RELATIVE PERCENT: 0 % (ref 0–2)
EOSINOPHILS ABSOLUTE: 0 K/UL (ref 0.05–0.5)
EOSINOPHILS RELATIVE PERCENT: 0 % (ref 0–6)
HBA1C MFR BLD: 5.4 % (ref 4–5.6)
HCT VFR BLD CALC: 36.3 % (ref 34–48)
HEMOGLOBIN: 12.1 G/DL (ref 11.5–15.5)
LYMPHOCYTES ABSOLUTE: 0.38 K/UL (ref 1.5–4)
LYMPHOCYTES RELATIVE PERCENT: 4 % (ref 20–42)
MCH RBC QN AUTO: 39 PG (ref 26–35)
MCHC RBC AUTO-ENTMCNC: 33.3 G/DL (ref 32–34.5)
MCV RBC AUTO: 117.1 FL (ref 80–99.9)
MONOCYTES ABSOLUTE: 0.94 K/UL (ref 0.1–0.95)
MONOCYTES RELATIVE PERCENT: 9 % (ref 2–12)
NEUTROPHILS ABSOLUTE: 9.49 K/UL (ref 1.8–7.3)
NEUTROPHILS RELATIVE PERCENT: 88 % (ref 43–80)
NUCLEATED RED BLOOD CELLS: 1 PER 100 WBC
PDW BLD-RTO: 14.8 % (ref 11.5–15)
PLATELET # BLD: 334 K/UL (ref 130–450)
PMV BLD AUTO: 10.1 FL (ref 7–12)
RBC # BLD: 3.1 M/UL (ref 3.5–5.5)
RBC # BLD: ABNORMAL 10*6/UL
WBC # BLD: 10.8 K/UL (ref 4.5–11.5)

## 2025-03-24 RX ORDER — NITROFURANTOIN 25; 75 MG/1; MG/1
100 CAPSULE ORAL 2 TIMES DAILY
Qty: 20 CAPSULE | Refills: 0 | Status: SHIPPED | OUTPATIENT
Start: 2025-03-24 | End: 2025-04-03

## 2025-03-24 RX ORDER — SIMVASTATIN 40 MG
40 TABLET ORAL NIGHTLY
Qty: 90 TABLET | Refills: 1 | Status: SHIPPED | OUTPATIENT
Start: 2025-03-24 | End: 2025-09-20

## 2025-03-24 RX ORDER — OMEPRAZOLE 40 MG/1
40 CAPSULE, DELAYED RELEASE ORAL DAILY
Qty: 90 CAPSULE | Refills: 1 | Status: SHIPPED | OUTPATIENT
Start: 2025-03-24

## 2025-03-24 RX ORDER — FUROSEMIDE 20 MG/1
20 TABLET ORAL DAILY
Qty: 90 TABLET | Refills: 1 | Status: SHIPPED | OUTPATIENT
Start: 2025-03-24 | End: 2025-09-20

## 2025-03-24 RX ORDER — HYDROCODONE BITARTRATE AND ACETAMINOPHEN 7.5; 325 MG/1; MG/1
1 TABLET ORAL 4 TIMES DAILY
Qty: 120 TABLET | Refills: 0 | Status: SHIPPED | OUTPATIENT
Start: 2025-03-24 | End: 2025-04-23

## 2025-03-24 RX ORDER — HYDROXYUREA 500 MG/1
CAPSULE ORAL
COMMUNITY
Start: 2024-12-30

## 2025-03-24 RX ORDER — ASPIRIN 325 MG
325 TABLET, DELAYED RELEASE (ENTERIC COATED) ORAL 2 TIMES DAILY
Qty: 30 TABLET | Refills: 5 | Status: SHIPPED | OUTPATIENT
Start: 2025-03-24

## 2025-03-24 RX ORDER — ZOLPIDEM TARTRATE 5 MG/1
TABLET ORAL
COMMUNITY
Start: 2025-03-04

## 2025-03-24 SDOH — ECONOMIC STABILITY: FOOD INSECURITY: WITHIN THE PAST 12 MONTHS, THE FOOD YOU BOUGHT JUST DIDN'T LAST AND YOU DIDN'T HAVE MONEY TO GET MORE.: NEVER TRUE

## 2025-03-24 SDOH — ECONOMIC STABILITY: FOOD INSECURITY: WITHIN THE PAST 12 MONTHS, YOU WORRIED THAT YOUR FOOD WOULD RUN OUT BEFORE YOU GOT MONEY TO BUY MORE.: NEVER TRUE

## 2025-03-24 ASSESSMENT — COLUMBIA-SUICIDE SEVERITY RATING SCALE - C-SSRS
2. HAVE YOU ACTUALLY HAD ANY THOUGHTS OF KILLING YOURSELF?: NO
1. WITHIN THE PAST MONTH, HAVE YOU WISHED YOU WERE DEAD OR WISHED YOU COULD GO TO SLEEP AND NOT WAKE UP?: NO
6. HAVE YOU EVER DONE ANYTHING, STARTED TO DO ANYTHING, OR PREPARED TO DO ANYTHING TO END YOUR LIFE?: NO

## 2025-03-24 ASSESSMENT — PATIENT HEALTH QUESTIONNAIRE - PHQ9
9. THOUGHTS THAT YOU WOULD BE BETTER OFF DEAD, OR OF HURTING YOURSELF: SEVERAL DAYS
10. IF YOU CHECKED OFF ANY PROBLEMS, HOW DIFFICULT HAVE THESE PROBLEMS MADE IT FOR YOU TO DO YOUR WORK, TAKE CARE OF THINGS AT HOME, OR GET ALONG WITH OTHER PEOPLE: VERY DIFFICULT
8. MOVING OR SPEAKING SO SLOWLY THAT OTHER PEOPLE COULD HAVE NOTICED. OR THE OPPOSITE, BEING SO FIGETY OR RESTLESS THAT YOU HAVE BEEN MOVING AROUND A LOT MORE THAN USUAL: SEVERAL DAYS
4. FEELING TIRED OR HAVING LITTLE ENERGY: SEVERAL DAYS
3. TROUBLE FALLING OR STAYING ASLEEP: NEARLY EVERY DAY
5. POOR APPETITE OR OVEREATING: MORE THAN HALF THE DAYS
1. LITTLE INTEREST OR PLEASURE IN DOING THINGS: SEVERAL DAYS
SUM OF ALL RESPONSES TO PHQ QUESTIONS 1-9: 11
6. FEELING BAD ABOUT YOURSELF - OR THAT YOU ARE A FAILURE OR HAVE LET YOURSELF OR YOUR FAMILY DOWN: NOT AT ALL
SUM OF ALL RESPONSES TO PHQ QUESTIONS 1-9: 11
SUM OF ALL RESPONSES TO PHQ QUESTIONS 1-9: 11
7. TROUBLE CONCENTRATING ON THINGS, SUCH AS READING THE NEWSPAPER OR WATCHING TELEVISION: NOT AT ALL
2. FEELING DOWN, DEPRESSED OR HOPELESS: MORE THAN HALF THE DAYS
SUM OF ALL RESPONSES TO PHQ QUESTIONS 1-9: 10

## 2025-03-24 NOTE — PROGRESS NOTES
Lab draw Right AC 22 x 1 1/4\". Venipuncture x 1  
Comprehensive Metabolic Panel  -     Hemoglobin A1C  -     Lipid Panel  -     T4, Free  -     TSH  -     Folate  -     Vitamin B12  -     Vitamin D 25 Hydroxy    Other hyperlipidemia  -     CBC with Auto Differential  -     Comprehensive Metabolic Panel  -     Hemoglobin A1C  -     Lipid Panel  -     T4, Free  -     TSH  -     Folate  -     Vitamin B12  -     Vitamin D 25 Hydroxy    Osteoporosis, unspecified osteoporosis type, unspecified pathological fracture presence  -     CBC with Auto Differential  -     Comprehensive Metabolic Panel  -     Hemoglobin A1C  -     Lipid Panel  -     T4, Free  -     TSH  -     Folate  -     Vitamin B12  -     Vitamin D 25 Hydroxy    Vitamin D deficiency  -     CBC with Auto Differential  -     Comprehensive Metabolic Panel  -     Hemoglobin A1C  -     Lipid Panel  -     T4, Free  -     TSH  -     Folate  -     Vitamin B12  -     Vitamin D 25 Hydroxy    Other orders  -     furosemide (LASIX) 20 MG tablet; Take 1 tablet by mouth daily  -     omeprazole (PRILOSEC) 40 MG delayed release capsule; Take 1 capsule by mouth daily  -     simvastatin (ZOCOR) 40 MG tablet; Take 1 tablet by mouth nightly  -     aspirin 325 MG EC tablet; Take 1 tablet by mouth 2 times daily  -     nitrofurantoin, macrocrystal-monohydrate, (MACROBID) 100 MG capsule; Take 1 capsule by mouth 2 times daily for 10 days          Discussions/Education provided to patients during visit:  [] Discussed the importance to stop smoking.  [] Advised to monitor eating habits.  [] Reviewed and discussed Imaging results.  [] Reviewed and discussed Lab results.  [] Discussed the importance of drinking plenty of fluids.  [] Cut down on Salt, Caffeine, and Sugar.  [] Non Compliant Patient   [x] Communicated with patient any concerns, to phone office.  Referral for home care for colostomy   Labs drawn today   Meds reviewed she is to be seen on a 3-month basis because of her meds that were giving her for pain etc. she does have an

## 2025-03-25 LAB
ALBUMIN: 4.3 G/DL (ref 3.5–5.2)
ALP BLD-CCNC: 122 U/L (ref 35–104)
ALT SERPL-CCNC: 10 U/L (ref 0–32)
ANION GAP SERPL CALCULATED.3IONS-SCNC: 14 MMOL/L (ref 7–16)
AST SERPL-CCNC: 27 U/L (ref 0–31)
BILIRUB SERPL-MCNC: 0.8 MG/DL (ref 0–1.2)
BUN BLDV-MCNC: 26 MG/DL (ref 6–23)
CALCIUM SERPL-MCNC: 10 MG/DL (ref 8.6–10.2)
CHLORIDE BLD-SCNC: 100 MMOL/L (ref 98–107)
CHOLESTEROL, TOTAL: 131 MG/DL
CO2: 25 MMOL/L (ref 22–29)
CREAT SERPL-MCNC: 0.9 MG/DL (ref 0.5–1)
FOLATE: >20 NG/ML (ref 4.8–24.2)
GFR, ESTIMATED: 62 ML/MIN/1.73M2
GLUCOSE BLD-MCNC: 116 MG/DL (ref 74–99)
HDLC SERPL-MCNC: 53 MG/DL
LDL CHOLESTEROL: 63 MG/DL
POTASSIUM SERPL-SCNC: 5 MMOL/L (ref 3.5–5)
SODIUM BLD-SCNC: 139 MMOL/L (ref 132–146)
T4 FREE: 1.3 NG/DL (ref 0.9–1.7)
TOTAL PROTEIN: 7.5 G/DL (ref 6.4–8.3)
TRIGL SERPL-MCNC: 74 MG/DL
TSH SERPL DL<=0.05 MIU/L-ACNC: 1.39 UIU/ML (ref 0.27–4.2)
VITAMIN B-12: 540 PG/ML (ref 211–946)
VITAMIN D 25-HYDROXY: 42.3 NG/ML (ref 30–100)
VLDLC SERPL CALC-MCNC: 15 MG/DL

## 2025-04-02 ENCOUNTER — APPOINTMENT (OUTPATIENT)
Dept: CT IMAGING | Age: 80
DRG: 377 | End: 2025-04-02
Payer: MEDICARE

## 2025-04-02 ENCOUNTER — HOSPITAL ENCOUNTER (INPATIENT)
Age: 80
LOS: 5 days | Discharge: NURSING, REHAB OR LONG-TERM CARE | DRG: 377 | End: 2025-04-08
Attending: EMERGENCY MEDICINE | Admitting: STUDENT IN AN ORGANIZED HEALTH CARE EDUCATION/TRAINING PROGRAM
Payer: MEDICARE

## 2025-04-02 ENCOUNTER — APPOINTMENT (OUTPATIENT)
Dept: ULTRASOUND IMAGING | Age: 80
DRG: 377 | End: 2025-04-02
Payer: MEDICARE

## 2025-04-02 ENCOUNTER — TELEPHONE (OUTPATIENT)
Dept: FAMILY MEDICINE CLINIC | Age: 80
End: 2025-04-02

## 2025-04-02 DIAGNOSIS — M54.9 CHRONIC BACK PAIN, UNSPECIFIED BACK LOCATION, UNSPECIFIED BACK PAIN LATERALITY: ICD-10-CM

## 2025-04-02 DIAGNOSIS — R62.7 FAILURE TO THRIVE IN ADULT: ICD-10-CM

## 2025-04-02 DIAGNOSIS — K92.2 GASTROINTESTINAL HEMORRHAGE, UNSPECIFIED GASTROINTESTINAL HEMORRHAGE TYPE: ICD-10-CM

## 2025-04-02 DIAGNOSIS — K92.1 MELENA: Primary | ICD-10-CM

## 2025-04-02 DIAGNOSIS — G89.29 CHRONIC BACK PAIN, UNSPECIFIED BACK LOCATION, UNSPECIFIED BACK PAIN LATERALITY: ICD-10-CM

## 2025-04-02 LAB
ABO + RH BLD: NORMAL
ALBUMIN SERPL-MCNC: 4.3 G/DL (ref 3.5–5.2)
ALP SERPL-CCNC: 145 U/L (ref 35–104)
ALT SERPL-CCNC: 17 U/L (ref 0–32)
ANION GAP SERPL CALCULATED.3IONS-SCNC: 13 MMOL/L (ref 7–16)
ARM BAND NUMBER: NORMAL
AST SERPL-CCNC: 25 U/L (ref 0–31)
BASOPHILS # BLD: 0 K/UL (ref 0–0.2)
BASOPHILS NFR BLD: 0 % (ref 0–2)
BILIRUB DIRECT SERPL-MCNC: <0.2 MG/DL (ref 0–0.3)
BILIRUB INDIRECT SERPL-MCNC: ABNORMAL MG/DL (ref 0–1)
BILIRUB SERPL-MCNC: 0.5 MG/DL (ref 0–1.2)
BLOOD BANK SAMPLE EXPIRATION: NORMAL
BLOOD GROUP ANTIBODIES SERPL: NEGATIVE
BUN SERPL-MCNC: 54 MG/DL (ref 6–23)
CALCIUM SERPL-MCNC: 10 MG/DL (ref 8.6–10.2)
CHLORIDE SERPL-SCNC: 96 MMOL/L (ref 98–107)
CO2 SERPL-SCNC: 23 MMOL/L (ref 22–29)
CREAT SERPL-MCNC: 0.9 MG/DL (ref 0.5–1)
EOSINOPHIL # BLD: 0 K/UL (ref 0.05–0.5)
EOSINOPHILS RELATIVE PERCENT: 0 % (ref 0–6)
ERYTHROCYTE [DISTWIDTH] IN BLOOD BY AUTOMATED COUNT: 14.4 % (ref 11.5–15)
GFR, ESTIMATED: 69 ML/MIN/1.73M2
GLUCOSE SERPL-MCNC: 143 MG/DL (ref 74–99)
HCT VFR BLD AUTO: 35.9 % (ref 34–48)
HGB BLD-MCNC: 12.1 G/DL (ref 11.5–15.5)
INR PPP: 1.2
LYMPHOCYTES NFR BLD: 1.56 K/UL (ref 1.5–4)
LYMPHOCYTES RELATIVE PERCENT: 7 % (ref 20–42)
MCH RBC QN AUTO: 38.9 PG (ref 26–35)
MCHC RBC AUTO-ENTMCNC: 33.7 G/DL (ref 32–34.5)
MCV RBC AUTO: 115.4 FL (ref 80–99.9)
MONOCYTES NFR BLD: 0.58 K/UL (ref 0.1–0.95)
MONOCYTES NFR BLD: 3 % (ref 2–12)
NEUTROPHILS NFR BLD: 90 % (ref 43–80)
NEUTS SEG NFR BLD: 20.26 K/UL (ref 1.8–7.3)
PARTIAL THROMBOPLASTIN TIME: 25 SEC (ref 24.5–35.1)
PLATELET # BLD AUTO: 607 K/UL (ref 130–450)
PMV BLD AUTO: 9.8 FL (ref 7–12)
POTASSIUM SERPL-SCNC: 4.7 MMOL/L (ref 3.5–5)
PROT SERPL-MCNC: 7 G/DL (ref 6.4–8.3)
PROTHROMBIN TIME: 12.8 SEC (ref 9.3–12.4)
RBC # BLD AUTO: 3.11 M/UL (ref 3.5–5.5)
RBC # BLD: ABNORMAL 10*6/UL
SODIUM SERPL-SCNC: 132 MMOL/L (ref 132–146)
WBC OTHER # BLD: 22.4 K/UL (ref 4.5–11.5)

## 2025-04-02 PROCEDURE — 80053 COMPREHEN METABOLIC PANEL: CPT

## 2025-04-02 PROCEDURE — 93005 ELECTROCARDIOGRAM TRACING: CPT | Performed by: EMERGENCY MEDICINE

## 2025-04-02 PROCEDURE — APPSS45 APP SPLIT SHARED TIME 31-45 MINUTES

## 2025-04-02 PROCEDURE — 86900 BLOOD TYPING SEROLOGIC ABO: CPT

## 2025-04-02 PROCEDURE — 96374 THER/PROPH/DIAG INJ IV PUSH: CPT

## 2025-04-02 PROCEDURE — 82248 BILIRUBIN DIRECT: CPT

## 2025-04-02 PROCEDURE — 6360000004 HC RX CONTRAST MEDICATION: Performed by: RADIOLOGY

## 2025-04-02 PROCEDURE — 74177 CT ABD & PELVIS W/CONTRAST: CPT

## 2025-04-02 PROCEDURE — 85025 COMPLETE CBC W/AUTO DIFF WBC: CPT

## 2025-04-02 PROCEDURE — 96375 TX/PRO/DX INJ NEW DRUG ADDON: CPT

## 2025-04-02 PROCEDURE — 86901 BLOOD TYPING SEROLOGIC RH(D): CPT

## 2025-04-02 PROCEDURE — 86850 RBC ANTIBODY SCREEN: CPT

## 2025-04-02 PROCEDURE — 85610 PROTHROMBIN TIME: CPT

## 2025-04-02 PROCEDURE — 76705 ECHO EXAM OF ABDOMEN: CPT

## 2025-04-02 PROCEDURE — 99223 1ST HOSP IP/OBS HIGH 75: CPT | Performed by: STUDENT IN AN ORGANIZED HEALTH CARE EDUCATION/TRAINING PROGRAM

## 2025-04-02 PROCEDURE — 85730 THROMBOPLASTIN TIME PARTIAL: CPT

## 2025-04-02 PROCEDURE — 99285 EMERGENCY DEPT VISIT HI MDM: CPT

## 2025-04-02 PROCEDURE — 6360000002 HC RX W HCPCS: Performed by: EMERGENCY MEDICINE

## 2025-04-02 RX ORDER — FENTANYL CITRATE 50 UG/ML
50 INJECTION, SOLUTION INTRAMUSCULAR; INTRAVENOUS ONCE
Status: COMPLETED | OUTPATIENT
Start: 2025-04-02 | End: 2025-04-02

## 2025-04-02 RX ORDER — PANTOPRAZOLE SODIUM 40 MG/10ML
40 INJECTION, POWDER, LYOPHILIZED, FOR SOLUTION INTRAVENOUS ONCE
Status: COMPLETED | OUTPATIENT
Start: 2025-04-02 | End: 2025-04-02

## 2025-04-02 RX ORDER — IOPAMIDOL 755 MG/ML
75 INJECTION, SOLUTION INTRAVASCULAR
Status: COMPLETED | OUTPATIENT
Start: 2025-04-02 | End: 2025-04-02

## 2025-04-02 RX ADMIN — PANTOPRAZOLE SODIUM 40 MG: 40 INJECTION, POWDER, FOR SOLUTION INTRAVENOUS at 22:41

## 2025-04-02 RX ADMIN — FENTANYL CITRATE 50 MCG: 50 INJECTION INTRAMUSCULAR; INTRAVENOUS at 22:01

## 2025-04-02 RX ADMIN — IOPAMIDOL 75 ML: 755 INJECTION, SOLUTION INTRAVENOUS at 20:49

## 2025-04-02 ASSESSMENT — PAIN DESCRIPTION - LOCATION
LOCATION: ABDOMEN
LOCATION: ABDOMEN

## 2025-04-02 ASSESSMENT — PAIN SCALES - GENERAL
PAINLEVEL_OUTOF10: 6
PAINLEVEL_OUTOF10: 8

## 2025-04-02 ASSESSMENT — PAIN - FUNCTIONAL ASSESSMENT: PAIN_FUNCTIONAL_ASSESSMENT: ACTIVITIES ARE NOT PREVENTED

## 2025-04-02 NOTE — TELEPHONE ENCOUNTER
received SECOND CALL FROM PT DAUGHTER WANTING ADVISE ON WHAT TO DO STATED THEY TOOK PT TO [facility] ON SUNDAY AND THEY WOULD NOT KEEP HER. STATED PT HAS NOT EATEN SINCE SUNDAY, AND ONLY GETS OUT OF BED TO USE THE BATHROOM. advised DAUGHTER TO TAKE HER TO [facility]

## 2025-04-02 NOTE — ED PROVIDER NOTES
hyclate]    -------------------------------------------------- RESULTS -------------------------------------------------    LABS:  Results for orders placed or performed during the hospital encounter of 04/02/25   Respiratory Panel, Molecular, with COVID-19 (Restricted: peds pts or suitable admitted adults)    Specimen: Nasopharyngeal Swab   Result Value Ref Range    Specimen Description .NASOPHARYNGEAL SWAB     Adenovirus PCR Not Detected Not Detected    Coronavirus 229E PCR Not Detected Not Detected    Coronavirus HKU1 PCR Not Detected Not Detected    Coronavirus NL63 PCR Not Detected Not Detected    Coronavirus OC43 PCR Not Detected Not Detected    SARS-CoV-2, PCR Not Detected Not Detected    Human Metapneumovirus PCR Not Detected Not Detected    Rhino/Enterovirus PCR Not Detected Not Detected    Influenza A by PCR Not Detected Not Detected    Influenza B by PCR Not Detected Not Detected    Parainfluenza 1 PCR Not Detected Not Detected    Parainfluenza 2 PCR Not Detected Not Detected    Parainfluenza 3 PCR Not Detected Not Detected    Parainfluenza 4 PCR Not Detected Not Detected    Resp Syncytial Virus PCR Not Detected Not Detected    Bordetella parapertussis by PCR Not Detected Not Detected    B Pertussis by PCR Not Detected Not Detected    Chlamydia pneumoniae By PCR Not Detected Not Detected    Mycoplasma pneumo by PCR Not Detected Not Detected   CBC with Auto Differential   Result Value Ref Range    WBC 22.4 (H) 4.5 - 11.5 k/uL    RBC 3.11 (L) 3.50 - 5.50 m/uL    Hemoglobin 12.1 11.5 - 15.5 g/dL    Hematocrit 35.9 34.0 - 48.0 %    .4 (H) 80.0 - 99.9 fL    MCH 38.9 (H) 26.0 - 35.0 pg    MCHC 33.7 32.0 - 34.5 g/dL    RDW 14.4 11.5 - 15.0 %    Platelets 607 (H) 130 - 450 k/uL    MPV 9.8 7.0 - 12.0 fL    Neutrophils % 90 (H) 43.0 - 80.0 %    Lymphocytes % 7 (L) 20.0 - 42.0 %    Monocytes % 3 2.0 - 12.0 %    Eosinophils % 0 0 - 6 %    Basophils % 0 0.0 - 2.0 %    Neutrophils Absolute 20.26 (H) 1.80 - 7.30

## 2025-04-02 NOTE — TELEPHONE ENCOUNTER
Received call from pt daughter stating her mom has taken a turn for the worse and she would like you to try and get her into [facility]. Please adise

## 2025-04-03 ENCOUNTER — APPOINTMENT (OUTPATIENT)
Dept: GENERAL RADIOLOGY | Age: 80
DRG: 377 | End: 2025-04-03
Payer: MEDICARE

## 2025-04-03 ENCOUNTER — ANESTHESIA EVENT (OUTPATIENT)
Dept: ENDOSCOPY | Age: 80
End: 2025-04-03
Payer: MEDICARE

## 2025-04-03 ENCOUNTER — ANESTHESIA (OUTPATIENT)
Dept: ENDOSCOPY | Age: 80
End: 2025-04-03
Payer: MEDICARE

## 2025-04-03 PROBLEM — K26.9 DUODENAL ULCER: Status: ACTIVE | Noted: 2025-04-03

## 2025-04-03 PROBLEM — E11.9 CONTROLLED TYPE 2 DIABETES MELLITUS WITHOUT COMPLICATION: Status: ACTIVE | Noted: 2025-04-03

## 2025-04-03 PROBLEM — K29.80 DUODENITIS: Status: ACTIVE | Noted: 2025-04-03

## 2025-04-03 PROBLEM — D75.839 THROMBOCYTOSIS: Status: ACTIVE | Noted: 2025-04-03

## 2025-04-03 PROBLEM — E87.20 ACIDOSIS: Status: ACTIVE | Noted: 2025-04-03

## 2025-04-03 PROBLEM — K92.1 MELENA: Status: ACTIVE | Noted: 2025-04-03

## 2025-04-03 LAB
ALBUMIN SERPL-MCNC: 3.8 G/DL (ref 3.5–5.2)
ALP SERPL-CCNC: 102 U/L (ref 35–104)
ALT SERPL-CCNC: 15 U/L (ref 0–32)
ANION GAP SERPL CALCULATED.3IONS-SCNC: 15 MMOL/L (ref 7–16)
AST SERPL-CCNC: 22 U/L (ref 0–31)
B PARAP IS1001 DNA NPH QL NAA+NON-PROBE: NOT DETECTED
B PERT DNA SPEC QL NAA+PROBE: NOT DETECTED
BILIRUB SERPL-MCNC: 0.3 MG/DL (ref 0–1.2)
BUN SERPL-MCNC: 49 MG/DL (ref 6–23)
C PNEUM DNA NPH QL NAA+NON-PROBE: NOT DETECTED
CALCIUM SERPL-MCNC: 9.4 MG/DL (ref 8.6–10.2)
CHLORIDE SERPL-SCNC: 100 MMOL/L (ref 98–107)
CO2 SERPL-SCNC: 19 MMOL/L (ref 22–29)
CREAT SERPL-MCNC: 0.7 MG/DL (ref 0.5–1)
CRP SERPL HS-MCNC: 14 MG/L (ref 0–5)
CRP SERPL HS-MCNC: 17 MG/L (ref 0–5)
ERYTHROCYTE [SEDIMENTATION RATE] IN BLOOD BY WESTERGREN METHOD: 10 MM/HR (ref 0–20)
ERYTHROCYTE [SEDIMENTATION RATE] IN BLOOD BY WESTERGREN METHOD: 19 MM/HR (ref 0–20)
FLUAV RNA NPH QL NAA+NON-PROBE: NOT DETECTED
FLUBV RNA NPH QL NAA+NON-PROBE: NOT DETECTED
GFR, ESTIMATED: 84 ML/MIN/1.73M2
GLUCOSE BLD-MCNC: 120 MG/DL (ref 74–99)
GLUCOSE BLD-MCNC: 79 MG/DL (ref 74–99)
GLUCOSE BLD-MCNC: 84 MG/DL (ref 74–99)
GLUCOSE BLD-MCNC: 88 MG/DL (ref 74–99)
GLUCOSE SERPL-MCNC: 95 MG/DL (ref 74–99)
HADV DNA NPH QL NAA+NON-PROBE: NOT DETECTED
HBA1C MFR BLD: 4.9 % (ref 4–5.6)
HCOV 229E RNA NPH QL NAA+NON-PROBE: NOT DETECTED
HCOV HKU1 RNA NPH QL NAA+NON-PROBE: NOT DETECTED
HCOV NL63 RNA NPH QL NAA+NON-PROBE: NOT DETECTED
HCOV OC43 RNA NPH QL NAA+NON-PROBE: NOT DETECTED
HMPV RNA NPH QL NAA+NON-PROBE: NOT DETECTED
HPIV1 RNA NPH QL NAA+NON-PROBE: NOT DETECTED
HPIV2 RNA NPH QL NAA+NON-PROBE: NOT DETECTED
HPIV3 RNA NPH QL NAA+NON-PROBE: NOT DETECTED
HPIV4 RNA NPH QL NAA+NON-PROBE: NOT DETECTED
M PNEUMO DNA NPH QL NAA+NON-PROBE: NOT DETECTED
POTASSIUM SERPL-SCNC: 3.9 MMOL/L (ref 3.5–5)
PROCALCITONIN SERPL-MCNC: 0.15 NG/ML (ref 0–0.08)
PROT SERPL-MCNC: 6.4 G/DL (ref 6.4–8.3)
RSV RNA NPH QL NAA+NON-PROBE: NOT DETECTED
RV+EV RNA NPH QL NAA+NON-PROBE: NOT DETECTED
SARS-COV-2 RNA NPH QL NAA+NON-PROBE: NOT DETECTED
SODIUM SERPL-SCNC: 134 MMOL/L (ref 132–146)
SPECIMEN DESCRIPTION: NORMAL

## 2025-04-03 PROCEDURE — 1200000000 HC SEMI PRIVATE

## 2025-04-03 PROCEDURE — 80053 COMPREHEN METABOLIC PANEL: CPT

## 2025-04-03 PROCEDURE — 2500000003 HC RX 250 WO HCPCS

## 2025-04-03 PROCEDURE — 0DB98ZX EXCISION OF DUODENUM, VIA NATURAL OR ARTIFICIAL OPENING ENDOSCOPIC, DIAGNOSTIC: ICD-10-PCS | Performed by: SURGERY

## 2025-04-03 PROCEDURE — 71045 X-RAY EXAM CHEST 1 VIEW: CPT

## 2025-04-03 PROCEDURE — 2580000003 HC RX 258

## 2025-04-03 PROCEDURE — 99232 SBSQ HOSP IP/OBS MODERATE 35: CPT | Performed by: INTERNAL MEDICINE

## 2025-04-03 PROCEDURE — 6370000000 HC RX 637 (ALT 250 FOR IP)

## 2025-04-03 PROCEDURE — 2580000003 HC RX 258: Performed by: NURSE ANESTHETIST, CERTIFIED REGISTERED

## 2025-04-03 PROCEDURE — 6370000000 HC RX 637 (ALT 250 FOR IP): Performed by: SURGERY

## 2025-04-03 PROCEDURE — 3609012400 HC EGD TRANSORAL BIOPSY SINGLE/MULTIPLE: Performed by: SURGERY

## 2025-04-03 PROCEDURE — 6360000002 HC RX W HCPCS: Performed by: NURSE ANESTHETIST, CERTIFIED REGISTERED

## 2025-04-03 PROCEDURE — 2709999900 HC NON-CHARGEABLE SUPPLY: Performed by: SURGERY

## 2025-04-03 PROCEDURE — 83036 HEMOGLOBIN GLYCOSYLATED A1C: CPT

## 2025-04-03 PROCEDURE — 2580000003 HC RX 258: Performed by: SURGERY

## 2025-04-03 PROCEDURE — 3700000001 HC ADD 15 MINUTES (ANESTHESIA): Performed by: SURGERY

## 2025-04-03 PROCEDURE — 6370000000 HC RX 637 (ALT 250 FOR IP): Performed by: STUDENT IN AN ORGANIZED HEALTH CARE EDUCATION/TRAINING PROGRAM

## 2025-04-03 PROCEDURE — 6360000002 HC RX W HCPCS: Performed by: SURGERY

## 2025-04-03 PROCEDURE — 84145 PROCALCITONIN (PCT): CPT

## 2025-04-03 PROCEDURE — 88305 TISSUE EXAM BY PATHOLOGIST: CPT

## 2025-04-03 PROCEDURE — 7100000001 HC PACU RECOVERY - ADDTL 15 MIN: Performed by: SURGERY

## 2025-04-03 PROCEDURE — 6360000002 HC RX W HCPCS

## 2025-04-03 PROCEDURE — 2500000003 HC RX 250 WO HCPCS: Performed by: SURGERY

## 2025-04-03 PROCEDURE — 85652 RBC SED RATE AUTOMATED: CPT

## 2025-04-03 PROCEDURE — 86140 C-REACTIVE PROTEIN: CPT

## 2025-04-03 PROCEDURE — 82962 GLUCOSE BLOOD TEST: CPT

## 2025-04-03 PROCEDURE — 3700000000 HC ANESTHESIA ATTENDED CARE: Performed by: SURGERY

## 2025-04-03 PROCEDURE — 0202U NFCT DS 22 TRGT SARS-COV-2: CPT

## 2025-04-03 PROCEDURE — 7100000000 HC PACU RECOVERY - FIRST 15 MIN: Performed by: SURGERY

## 2025-04-03 RX ORDER — GLUCAGON 1 MG/ML
1 KIT INJECTION PRN
Status: DISCONTINUED | OUTPATIENT
Start: 2025-04-03 | End: 2025-04-08 | Stop reason: HOSPADM

## 2025-04-03 RX ORDER — METRONIDAZOLE 500 MG/100ML
500 INJECTION, SOLUTION INTRAVENOUS EVERY 8 HOURS
Status: DISCONTINUED | OUTPATIENT
Start: 2025-04-03 | End: 2025-04-08 | Stop reason: HOSPADM

## 2025-04-03 RX ORDER — INSULIN LISPRO 100 [IU]/ML
0-4 INJECTION, SOLUTION INTRAVENOUS; SUBCUTANEOUS
Status: DISCONTINUED | OUTPATIENT
Start: 2025-04-03 | End: 2025-04-06

## 2025-04-03 RX ORDER — SODIUM CHLORIDE 9 MG/ML
INJECTION, SOLUTION INTRAVENOUS PRN
Status: DISCONTINUED | OUTPATIENT
Start: 2025-04-03 | End: 2025-04-08 | Stop reason: HOSPADM

## 2025-04-03 RX ORDER — DEXTROSE MONOHYDRATE 100 MG/ML
INJECTION, SOLUTION INTRAVENOUS CONTINUOUS PRN
Status: DISCONTINUED | OUTPATIENT
Start: 2025-04-03 | End: 2025-04-08 | Stop reason: HOSPADM

## 2025-04-03 RX ORDER — SODIUM CHLORIDE 0.9 % (FLUSH) 0.9 %
5-40 SYRINGE (ML) INJECTION PRN
Status: DISCONTINUED | OUTPATIENT
Start: 2025-04-03 | End: 2025-04-08 | Stop reason: HOSPADM

## 2025-04-03 RX ORDER — SODIUM CHLORIDE 0.9 % (FLUSH) 0.9 %
5-40 SYRINGE (ML) INJECTION EVERY 12 HOURS SCHEDULED
Status: DISCONTINUED | OUTPATIENT
Start: 2025-04-03 | End: 2025-04-08 | Stop reason: HOSPADM

## 2025-04-03 RX ORDER — ACETAMINOPHEN 325 MG/1
650 TABLET ORAL EVERY 6 HOURS PRN
Status: DISCONTINUED | OUTPATIENT
Start: 2025-04-03 | End: 2025-04-08 | Stop reason: HOSPADM

## 2025-04-03 RX ORDER — SUCRALFATE 1 G/1
1 TABLET ORAL EVERY 6 HOURS SCHEDULED
Status: DISCONTINUED | OUTPATIENT
Start: 2025-04-03 | End: 2025-04-08 | Stop reason: HOSPADM

## 2025-04-03 RX ORDER — ZOLPIDEM TARTRATE 5 MG/1
5 TABLET ORAL NIGHTLY PRN
Status: DISCONTINUED | OUTPATIENT
Start: 2025-04-03 | End: 2025-04-08 | Stop reason: HOSPADM

## 2025-04-03 RX ORDER — PROPOFOL 10 MG/ML
INJECTION, EMULSION INTRAVENOUS
Status: DISCONTINUED | OUTPATIENT
Start: 2025-04-03 | End: 2025-04-03 | Stop reason: SDUPTHER

## 2025-04-03 RX ORDER — DICYCLOMINE HYDROCHLORIDE 10 MG/1
20 CAPSULE ORAL 4 TIMES DAILY PRN
Status: DISCONTINUED | OUTPATIENT
Start: 2025-04-03 | End: 2025-04-06

## 2025-04-03 RX ORDER — SODIUM CHLORIDE 9 MG/ML
INJECTION, SOLUTION INTRAVENOUS
Status: DISCONTINUED | OUTPATIENT
Start: 2025-04-03 | End: 2025-04-03 | Stop reason: SDUPTHER

## 2025-04-03 RX ORDER — POLYETHYLENE GLYCOL 3350 17 G/17G
17 POWDER, FOR SOLUTION ORAL DAILY PRN
Status: DISCONTINUED | OUTPATIENT
Start: 2025-04-03 | End: 2025-04-08 | Stop reason: HOSPADM

## 2025-04-03 RX ORDER — ACETAMINOPHEN 650 MG/1
650 SUPPOSITORY RECTAL EVERY 6 HOURS PRN
Status: DISCONTINUED | OUTPATIENT
Start: 2025-04-03 | End: 2025-04-08 | Stop reason: HOSPADM

## 2025-04-03 RX ADMIN — METRONIDAZOLE 500 MG: 500 INJECTION, SOLUTION INTRAVENOUS at 03:54

## 2025-04-03 RX ADMIN — PROPOFOL 140 MG: 10 INJECTION, EMULSION INTRAVENOUS at 14:22

## 2025-04-03 RX ADMIN — DICYCLOMINE HYDROCHLORIDE 20 MG: 10 CAPSULE ORAL at 17:49

## 2025-04-03 RX ADMIN — ZOLPIDEM TARTRATE 5 MG: 5 TABLET ORAL at 20:54

## 2025-04-03 RX ADMIN — DICYCLOMINE HYDROCHLORIDE 20 MG: 10 CAPSULE ORAL at 03:27

## 2025-04-03 RX ADMIN — SUCRALFATE 1 G: 1 TABLET ORAL at 17:49

## 2025-04-03 RX ADMIN — SODIUM CHLORIDE, PRESERVATIVE FREE 10 ML: 5 INJECTION INTRAVENOUS at 20:54

## 2025-04-03 RX ADMIN — SODIUM CHLORIDE: 9 INJECTION, SOLUTION INTRAVENOUS at 14:21

## 2025-04-03 RX ADMIN — METRONIDAZOLE 500 MG: 500 INJECTION, SOLUTION INTRAVENOUS at 17:53

## 2025-04-03 RX ADMIN — ACETAMINOPHEN 650 MG: 325 TABLET ORAL at 11:54

## 2025-04-03 RX ADMIN — SODIUM CHLORIDE 40 MG: 9 INJECTION INTRAMUSCULAR; INTRAVENOUS; SUBCUTANEOUS at 10:28

## 2025-04-03 RX ADMIN — SODIUM CHLORIDE 40 MG: 9 INJECTION INTRAMUSCULAR; INTRAVENOUS; SUBCUTANEOUS at 20:53

## 2025-04-03 RX ADMIN — WATER 1000 MG: 1 INJECTION INTRAMUSCULAR; INTRAVENOUS; SUBCUTANEOUS at 03:23

## 2025-04-03 RX ADMIN — METRONIDAZOLE 500 MG: 500 INJECTION, SOLUTION INTRAVENOUS at 10:32

## 2025-04-03 RX ADMIN — SODIUM CHLORIDE, PRESERVATIVE FREE 10 ML: 5 INJECTION INTRAVENOUS at 15:26

## 2025-04-03 ASSESSMENT — PAIN SCALES - GENERAL
PAINLEVEL_OUTOF10: 10
PAINLEVEL_OUTOF10: 7
PAINLEVEL_OUTOF10: 4
PAINLEVEL_OUTOF10: 5
PAINLEVEL_OUTOF10: 5

## 2025-04-03 ASSESSMENT — PAIN - FUNCTIONAL ASSESSMENT
PAIN_FUNCTIONAL_ASSESSMENT: ACTIVITIES ARE NOT PREVENTED
PAIN_FUNCTIONAL_ASSESSMENT: 0-10

## 2025-04-03 ASSESSMENT — PAIN DESCRIPTION - DESCRIPTORS
DESCRIPTORS: ACHING
DESCRIPTORS: DISCOMFORT

## 2025-04-03 ASSESSMENT — PAIN DESCRIPTION - LOCATION
LOCATION: HEAD
LOCATION: ABDOMEN
LOCATION: ABDOMEN
LOCATION: HEAD;ABDOMEN
LOCATION: HEAD;ABDOMEN

## 2025-04-03 NOTE — OP NOTE
Marilu Marcos  YOB: 1945  [id number]    Pre-operative Diagnosis: Melena    Post-operative Diagnosis: Duodenal ulcer    Procedure: EGD with biopsies    Anesthesia: LMAC    Surgeon: Rosalio Mcadams MD    Assistant: None    Estimated Blood Loss: none    Complications: none    Specimens: antrum     Procedure:  Pt was taken to the endoscopy suite and placed on the endoscopy table in a left lateral decubitus position.  LMAC anesthesia was administered and a bite block was inserted.  A lubricated gastroscope was inserted into the oropharynx and advanced into the esophagus.  The esophagus was inspected throughout its length.  There were no varices.  No evidence of esophagitis.  The GE junction was sharp at 38 cm.  The stomach was entered and insufflated.  The stomach was significantly distended.  The antrum was normal.  The pyloric outlet was somewhat angulated due to the gastric distention.  I was able to intubate the pyloric channel and advance the scope into the duodenal bulb.  There was a large duodenal ulcer.  No adherent clot or visible vessel.  The second portion was normal.   The scope was pulled back into the antrum.  Biopsies were taken for H. pylori.  The scope was retroflexed.  The angle of the stomach was normal.  The proximal greater and lesser curves were normal.  The fundus was normal.  At the GE junction, there was no hiatal hernia present.  The stomach was deflated and the scope was withdrawn and removed.  The patient tolerated the procedure well.    Impression: Gastric distention from a partial gastric outlet obstruction.  Large duodenal ulcer.    Plan:  Continue PPI and Carafate.  Clear liquids.  Monitor for recurrent bleeding      Rosalio Mcadams MD

## 2025-04-03 NOTE — ANESTHESIA PRE PROCEDURE
Department of Anesthesiology  Preprocedure Note       Name:  Marilu Marcos   Age:  79 y.o.  :  1945                                          MRN:  [id number]         Date:  4/3/2025      Surgeon: Surgeon(s):  Rosalio Mcadams MD    Procedure: Procedure(s):  ESOPHAGOGASTRODUODENOSCOPY    Medications prior to admission:   Prior to Admission medications    Medication Sig Start Date End Date Taking? Authorizing Provider   hydroxyurea (HYDREA) 500 MG chemo capsule Take 1 capsule by mouth every morning   Yes ProviderAnastasiia MD   HYDROcodone-acetaminophen (NORCO) 7.5-325 MG per tablet Take 1 tablet by mouth 4 times daily for 30 days. Max Daily Amount: 4 tablets 3/24/25 4/23/25 Yes Juan Carlos Carter DO   furosemide (LASIX) 20 MG tablet Take 1 tablet by mouth daily  Patient taking differently: Take 1 tablet by mouth every morning 3/24/25 9/20/25 Yes Juan Carlos Carter DO   omeprazole (PRILOSEC) 40 MG delayed release capsule Take 1 capsule by mouth daily  Patient taking differently: Take 1 capsule by mouth every morning 3/24/25  Yes Juan Carlos Carter DO   simvastatin (ZOCOR) 40 MG tablet Take 1 tablet by mouth nightly 3/24/25 9/20/25 Yes Juan Carlos Carter DO   aspirin 325 MG EC tablet Take 1 tablet by mouth 2 times daily  Patient taking differently: Take 1 tablet by mouth every morning 3/24/25  Yes Juan Carlos Carter DO   nitrofurantoin, macrocrystal-monohydrate, (MACROBID) 100 MG capsule Take 1 capsule by mouth 2 times daily for 10 days 3/24/25 4/3/25 Yes Juan Carlos Carter DO   LORazepam (ATIVAN) 0.5 MG tablet Take 1 tablet by mouth 2 times daily for 30 days. Max Daily Amount: 1 mg 3/21/25 4/20/25 Yes Juan Carlos Carter DO   dicyclomine (BENTYL) 20 MG tablet TAKE ONE TABLET BY MOUTH TWICE A DAY AS NEEDED  Patient taking differently: Take 1 tablet by mouth 2 times daily as needed (ABDOMINAL SPASMS) TAKE ONE TABLET BY MOUTH TWICE A DAY AS NEEDED 25  Yes Juan Carlos Carter DO   metoprolol tartrate (LOPRESSOR) 50 MG

## 2025-04-03 NOTE — CONSULTS
General Surgery   Consult Note      Patient's Name/Date of Birth: Marilu Marcos / 1945    Date: April 3, 2025     PCP: Juan Carlos Carter DO     Chief Complaint:     HPI:   Marilu Marcos is a 79 y.o. female who presents for evaluation of poor p.o. intake, weakness, fatigue, inability to care for self.  Patient was seen in the ED at [facility] on Sunday with complaints of abdominal pain but after workup no abdominal pathology was found and patient was discharged with a prescription of cefdinir for right lower lobe pneumonia.  Patient is somnolent/confused and history taken from family/chart review and a bit from patient, who complain that patient has not been eating or drinking since Sunday and is no longer able to care for self at home.  Patient has also been having increased output of black melanotic stool from her into the ostomy since Monday.    Patient Active Problem List   Diagnosis    Crohn's colitis, unspecified complication (HCC)    Acute blood loss anemia    Anorectal pain    Anal stricture    Attention to ileostomy (HCC)    CAD (coronary artery disease), native coronary artery    Chronic back pain    Crohn's disease without complication (HCC)    Demand ischemia (HCC)    Dyslipidemia    Fatigue associated with anemia    History of anticoagulant therapy    History of Nikolai's disease    History of total right hip replacement    History of ulcerative colitis    Hypertension    Myeloproliferative disorder (HCC)    Other hyperlipidemia    Pain of right fibula    Pressure ulcer of buttock    Severe protein-calorie malnutrition    Type 2 diabetes mellitus with diabetic nephropathy (HCC)    Coronary artery disease involving native coronary artery with angina pectoris, unspecified whether native or transplanted heart    Major depressive disorder, recurrent, mild    Major depressive disorder, recurrent, unspecified    Closed intertrochanteric fracture, left, initial encounter    Melena    Duodenitis

## 2025-04-03 NOTE — H&P
normal air movement, no respiratory distress  Cardiovascular: normal rate, normal S1 and S2  Abdomen: soft, non-tender, non-distended, normal bowel sounds, no masses or organomegaly, ostomy with bag intact  Extremities: no cyanosis, no clubbing and no edema  Neurologic: speech normal        LABS:  Recent Labs     04/02/25  1853      K 4.7   CL 96*   CO2 23   BUN 54*   CREATININE 0.9   GLUCOSE 143*   CALCIUM 10.0       Recent Labs     04/02/25  1853   WBC 22.4*   RBC 3.11*   HGB 12.1   HCT 35.9   .4*   MCH 38.9*   MCHC 33.7   RDW 14.4   *   MPV 9.8       No results for input(s): \"POCGLU\" in the last 72 hours.        Radiology:   US GALLBLADDER RUQ   Final Result   Cholelithiasis with mild gallbladder wall thickening..  No pericholecystic   fluid or extrahepatic biliary dilatation.         CT ABDOMEN PELVIS W IV CONTRAST Additional Contrast? None   Final Result   1.  Status post colectomy.  Presence of midline ileostomy with a component of   parastomal hernia without signs for obstruction inflammatory changes.      2.  There are findings that can indicate a component of inflammatory process   at the level the pylorus of the stomach and proximal duodenal bowel this can   indicate a component of antral duodenitis.  It appears to be causing partial   gastric outlet obstruction to a certain point or functional obstruction of   the stomach.  Consider consultation with upper GI endoscopy.      3.  There is some degree of adjacent the inflammatory changes in the   omental/mesenteric fat planes surrounding in the right upper quadrant   extending surrounding the pylorus/duodenal bulb, into periportal   spaces/gallbladder fossa/pancreatic groove with some extension into adjacent   ileal segments in the site of a previous right paramedian osteotomy.      4.  Partially distended gallbladder with gallstones and some discrete   thickening enhancement of the wall also surrounded by some is stranding of   fat

## 2025-04-03 NOTE — ANESTHESIA POSTPROCEDURE EVALUATION
Department of Anesthesiology  Postprocedure Note    Patient: Marilu Marcos  MRN: [id number]  YOB: 1945  Date of evaluation: 4/3/2025    Procedure Summary       Date: 04/03/25 Room / Location: [place] [address] / [facility]    Anesthesia Start: 1421 Anesthesia Stop: 1437    Procedure: ESOPHAGOGASTRODUODENOSCOPY BIOPSY Diagnosis:       Melena      (Melena [K92.1])    Surgeons: Rosalio Mcadams MD Responsible Provider: Carissa Patel DO    Anesthesia Type: MAC ASA Status: 3            Anesthesia Type: No value filed.    Chandni Phase I: Chandni Score: 10    Chandni Phase II:      Anesthesia Post Evaluation    Patient location during evaluation: bedside  Patient participation: complete - patient participated  Level of consciousness: awake  Airway patency: patent  Nausea & Vomiting: no vomiting and no nausea  Cardiovascular status: hemodynamically stable  Respiratory status: spontaneous ventilation and room air  Hydration status: stable        No notable events documented.

## 2025-04-03 NOTE — CARE COORDINATION
Transition of Care-Met with patient's daughters Gemma and Nidhi, introduced myself and CM role in care coordination. Patient resides in a one story condo, she has a cane and walker and uses as needed. Per daughters concern that patient is not safe at home. PCP is Dr. Juan Carlos Carter, preferred pharmacy is [facility] in [place]. Patient has been declining over the last month, feel patient would benefit from SNF. Patient had a prior stay at [facility], family requested referral, sent over to building liaison. PT/OT ordered to see in am. CM following.    Latasha BENJAMINN, RN  [facility]

## 2025-04-03 NOTE — ACP (ADVANCE CARE PLANNING)
Advance Care Planning   Healthcare Decision Maker:    Primary Decision Maker: Gemma Hussein - Child - [phone]    Secondary Decision Maker: Royce Marcos \"Harjeet\" - Child - [phone]    Click here to complete Healthcare Decision Makers including selection of the Healthcare Decision Maker Relationship (ie \"Primary\").

## 2025-04-04 ENCOUNTER — APPOINTMENT (OUTPATIENT)
Dept: GENERAL RADIOLOGY | Age: 80
DRG: 377 | End: 2025-04-04
Payer: MEDICARE

## 2025-04-04 PROBLEM — E43 SEVERE PROTEIN-CALORIE MALNUTRITION: Status: ACTIVE | Noted: 2025-04-04

## 2025-04-04 LAB
ALBUMIN SERPL-MCNC: 3.8 G/DL (ref 3.5–5.2)
ALP SERPL-CCNC: 104 U/L (ref 35–104)
ALT SERPL-CCNC: 14 U/L (ref 0–32)
ANION GAP SERPL CALCULATED.3IONS-SCNC: 15 MMOL/L (ref 7–16)
AST SERPL-CCNC: 20 U/L (ref 0–31)
BASOPHILS # BLD: 0.09 K/UL (ref 0–0.2)
BASOPHILS NFR BLD: 1 % (ref 0–2)
BILIRUB SERPL-MCNC: 0.2 MG/DL (ref 0–1.2)
BUN SERPL-MCNC: 37 MG/DL (ref 6–23)
CALCIUM SERPL-MCNC: 9.3 MG/DL (ref 8.6–10.2)
CHLORIDE SERPL-SCNC: 102 MMOL/L (ref 98–107)
CO2 SERPL-SCNC: 19 MMOL/L (ref 22–29)
CREAT SERPL-MCNC: 0.6 MG/DL (ref 0.5–1)
EKG ATRIAL RATE: 64 BPM
EKG P AXIS: 54 DEGREES
EKG P-R INTERVAL: 170 MS
EKG Q-T INTERVAL: 480 MS
EKG QRS DURATION: 130 MS
EKG QTC CALCULATION (BAZETT): 495 MS
EKG R AXIS: -67 DEGREES
EKG T AXIS: -16 DEGREES
EKG VENTRICULAR RATE: 64 BPM
EOSINOPHIL # BLD: 0.38 K/UL (ref 0.05–0.5)
EOSINOPHILS RELATIVE PERCENT: 2 % (ref 0–6)
ERYTHROCYTE [DISTWIDTH] IN BLOOD BY AUTOMATED COUNT: 14.6 % (ref 11.5–15)
GFR, ESTIMATED: 89 ML/MIN/1.73M2
GLUCOSE BLD-MCNC: 100 MG/DL (ref 74–99)
GLUCOSE BLD-MCNC: 84 MG/DL (ref 74–99)
GLUCOSE BLD-MCNC: 86 MG/DL (ref 74–99)
GLUCOSE BLD-MCNC: 88 MG/DL (ref 74–99)
GLUCOSE BLD-MCNC: 95 MG/DL (ref 74–99)
GLUCOSE SERPL-MCNC: 96 MG/DL (ref 74–99)
HCT VFR BLD AUTO: 33 % (ref 34–48)
HGB BLD-MCNC: 11.2 G/DL (ref 11.5–15.5)
IMM GRANULOCYTES # BLD AUTO: 0.43 K/UL (ref 0–0.58)
IMM GRANULOCYTES NFR BLD: 3 % (ref 0–5)
LYMPHOCYTES NFR BLD: 0.94 K/UL (ref 1.5–4)
LYMPHOCYTES RELATIVE PERCENT: 5 % (ref 20–42)
MAGNESIUM SERPL-MCNC: 2.4 MG/DL (ref 1.6–2.6)
MCH RBC QN AUTO: 38.6 PG (ref 26–35)
MCHC RBC AUTO-ENTMCNC: 33.9 G/DL (ref 32–34.5)
MCV RBC AUTO: 113.8 FL (ref 80–99.9)
MONOCYTES NFR BLD: 1.55 K/UL (ref 0.1–0.95)
MONOCYTES NFR BLD: 9 % (ref 2–12)
NEUTROPHILS NFR BLD: 81 % (ref 43–80)
NEUTS SEG NFR BLD: 13.99 K/UL (ref 1.8–7.3)
PHOSPHATE SERPL-MCNC: 3.4 MG/DL (ref 2.5–4.5)
PLATELET # BLD AUTO: 645 K/UL (ref 130–450)
PMV BLD AUTO: 9.8 FL (ref 7–12)
POTASSIUM SERPL-SCNC: 3.8 MMOL/L (ref 3.5–5)
PROT SERPL-MCNC: 6.4 G/DL (ref 6.4–8.3)
RBC # BLD AUTO: 2.9 M/UL (ref 3.5–5.5)
RBC # BLD: ABNORMAL 10*6/UL
SODIUM SERPL-SCNC: 136 MMOL/L (ref 132–146)
WBC OTHER # BLD: 17.4 K/UL (ref 4.5–11.5)

## 2025-04-04 PROCEDURE — 36415 COLL VENOUS BLD VENIPUNCTURE: CPT

## 2025-04-04 PROCEDURE — 71045 X-RAY EXAM CHEST 1 VIEW: CPT

## 2025-04-04 PROCEDURE — 2500000003 HC RX 250 WO HCPCS: Performed by: SURGERY

## 2025-04-04 PROCEDURE — 6360000002 HC RX W HCPCS: Performed by: SURGERY

## 2025-04-04 PROCEDURE — 1200000000 HC SEMI PRIVATE

## 2025-04-04 PROCEDURE — 97165 OT EVAL LOW COMPLEX 30 MIN: CPT

## 2025-04-04 PROCEDURE — 74018 RADEX ABDOMEN 1 VIEW: CPT

## 2025-04-04 PROCEDURE — 99232 SBSQ HOSP IP/OBS MODERATE 35: CPT | Performed by: INTERNAL MEDICINE

## 2025-04-04 PROCEDURE — 6370000000 HC RX 637 (ALT 250 FOR IP): Performed by: SURGERY

## 2025-04-04 PROCEDURE — 6370000000 HC RX 637 (ALT 250 FOR IP): Performed by: STUDENT IN AN ORGANIZED HEALTH CARE EDUCATION/TRAINING PROGRAM

## 2025-04-04 PROCEDURE — 82962 GLUCOSE BLOOD TEST: CPT

## 2025-04-04 PROCEDURE — 85025 COMPLETE CBC W/AUTO DIFF WBC: CPT

## 2025-04-04 PROCEDURE — 2580000003 HC RX 258: Performed by: SURGERY

## 2025-04-04 PROCEDURE — 97161 PT EVAL LOW COMPLEX 20 MIN: CPT

## 2025-04-04 PROCEDURE — 6360000002 HC RX W HCPCS

## 2025-04-04 PROCEDURE — 51798 US URINE CAPACITY MEASURE: CPT

## 2025-04-04 PROCEDURE — 84100 ASSAY OF PHOSPHORUS: CPT

## 2025-04-04 PROCEDURE — 83735 ASSAY OF MAGNESIUM: CPT

## 2025-04-04 PROCEDURE — 80053 COMPREHEN METABOLIC PANEL: CPT

## 2025-04-04 PROCEDURE — 93010 ELECTROCARDIOGRAM REPORT: CPT | Performed by: INTERNAL MEDICINE

## 2025-04-04 RX ORDER — PROCHLORPERAZINE EDISYLATE 5 MG/ML
10 INJECTION INTRAMUSCULAR; INTRAVENOUS EVERY 6 HOURS PRN
Status: DISCONTINUED | OUTPATIENT
Start: 2025-04-04 | End: 2025-04-08 | Stop reason: HOSPADM

## 2025-04-04 RX ORDER — ONDANSETRON 2 MG/ML
4 INJECTION INTRAMUSCULAR; INTRAVENOUS EVERY 6 HOURS PRN
Status: DISCONTINUED | OUTPATIENT
Start: 2025-04-04 | End: 2025-04-08 | Stop reason: HOSPADM

## 2025-04-04 RX ADMIN — ONDANSETRON 4 MG: 2 INJECTION, SOLUTION INTRAMUSCULAR; INTRAVENOUS at 03:28

## 2025-04-04 RX ADMIN — WATER 1000 MG: 1 INJECTION INTRAMUSCULAR; INTRAVENOUS; SUBCUTANEOUS at 01:31

## 2025-04-04 RX ADMIN — SUCRALFATE 1 G: 1 TABLET ORAL at 22:34

## 2025-04-04 RX ADMIN — SODIUM CHLORIDE 40 MG: 9 INJECTION INTRAMUSCULAR; INTRAVENOUS; SUBCUTANEOUS at 08:14

## 2025-04-04 RX ADMIN — SODIUM CHLORIDE, PRESERVATIVE FREE 10 ML: 5 INJECTION INTRAVENOUS at 21:04

## 2025-04-04 RX ADMIN — DICYCLOMINE HYDROCHLORIDE 20 MG: 10 CAPSULE ORAL at 12:13

## 2025-04-04 RX ADMIN — ACETAMINOPHEN 650 MG: 325 TABLET ORAL at 22:34

## 2025-04-04 RX ADMIN — SUCRALFATE 1 G: 1 TABLET ORAL at 11:23

## 2025-04-04 RX ADMIN — METRONIDAZOLE 500 MG: 500 INJECTION, SOLUTION INTRAVENOUS at 01:41

## 2025-04-04 RX ADMIN — ONDANSETRON 4 MG: 2 INJECTION, SOLUTION INTRAMUSCULAR; INTRAVENOUS at 09:41

## 2025-04-04 RX ADMIN — DICYCLOMINE HYDROCHLORIDE 20 MG: 10 CAPSULE ORAL at 06:11

## 2025-04-04 RX ADMIN — SUCRALFATE 1 G: 1 TABLET ORAL at 01:31

## 2025-04-04 RX ADMIN — SODIUM CHLORIDE, PRESERVATIVE FREE 10 ML: 5 INJECTION INTRAVENOUS at 08:14

## 2025-04-04 RX ADMIN — SUCRALFATE 1 G: 1 TABLET ORAL at 16:11

## 2025-04-04 RX ADMIN — SODIUM CHLORIDE 40 MG: 9 INJECTION INTRAMUSCULAR; INTRAVENOUS; SUBCUTANEOUS at 20:36

## 2025-04-04 RX ADMIN — DICYCLOMINE HYDROCHLORIDE 20 MG: 10 CAPSULE ORAL at 18:15

## 2025-04-04 RX ADMIN — PROCHLORPERAZINE EDISYLATE 10 MG: 5 INJECTION INTRAMUSCULAR; INTRAVENOUS at 06:10

## 2025-04-04 RX ADMIN — METRONIDAZOLE 500 MG: 500 INJECTION, SOLUTION INTRAVENOUS at 09:44

## 2025-04-04 RX ADMIN — METRONIDAZOLE 500 MG: 500 INJECTION, SOLUTION INTRAVENOUS at 18:16

## 2025-04-04 RX ADMIN — SUCRALFATE 1 G: 1 TABLET ORAL at 06:11

## 2025-04-04 RX ADMIN — ACETAMINOPHEN 650 MG: 325 TABLET ORAL at 16:11

## 2025-04-04 ASSESSMENT — PAIN DESCRIPTION - DESCRIPTORS
DESCRIPTORS: DISCOMFORT;ACHING;CRAMPING
DESCRIPTORS: DISCOMFORT;CRAMPING

## 2025-04-04 ASSESSMENT — PAIN DESCRIPTION - LOCATION
LOCATION: ABDOMEN
LOCATION: NECK;ABDOMEN

## 2025-04-04 ASSESSMENT — PAIN SCALES - GENERAL
PAINLEVEL_OUTOF10: 3
PAINLEVEL_OUTOF10: 10
PAINLEVEL_OUTOF10: 8

## 2025-04-04 NOTE — CARE COORDINATION
Transition of Care-met with patient this am, confirmed discharge plan was [facility]. Therapy scores in EPIC. [facility] accepted, bed will be available on Sunday 4/6. ALLEGRA, HENs and Len form completed. Patient DOES NOT NEED A PRE-CERT, CAN TRANSFER SUNDAY, NOT BEFORE.    Latasha BENJAMINN, RN  [facility]

## 2025-04-04 NOTE — PLAN OF CARE
Problem: ABCDS Injury Assessment  Goal: Absence of physical injury  4/4/2025 0027 by Lakshmi Lomas, RN  Outcome: Progressing  Flowsheets (Taken 4/3/2025 2053)  Absence of Physical Injury: Implement safety measures based on patient assessment  4/3/2025 1857 by Jigna Salazar RN  Outcome: Progressing     Problem: Skin/Tissue Integrity  Goal: Skin integrity remains intact  Description: 1.  Monitor for areas of redness and/or skin breakdown  2.  Assess vascular access sites hourly  3.  Every 4-6 hours minimum:  Change oxygen saturation probe site  4.  Every 4-6 hours:  If on nasal continuous positive airway pressure, respiratory therapy assess nares and determine need for appliance change or resting period  4/4/2025 0027 by Lakshmi Lomas, RN  Outcome: Progressing  4/3/2025 1857 by Jigna Salazar RN  Outcome: Progressing     Problem: Discharge Planning  Goal: Discharge to home or other facility with appropriate resources  Outcome: Progressing     Problem: Pain  Goal: Verbalizes/displays adequate comfort level or baseline comfort level  Outcome: Progressing     Problem: Chronic Conditions and Co-morbidities  Goal: Patient's chronic conditions and co-morbidity symptoms are monitored and maintained or improved  Outcome: Progressing     Problem: Safety - Adult  Goal: Free from fall injury  Outcome: Progressing

## 2025-04-04 NOTE — DISCHARGE INSTR - COC
Fluid Restriction: no  Last Modified Barium Swallow with Video (Video Swallowing Test): not done    Treatments at the Time of Hospital Discharge:   Respiratory Treatments: ***  Oxygen Therapy:  is not on home oxygen therapy.  Ventilator:    - No ventilator support    Rehab Therapies: Physical Therapy  Weight Bearing Status/Restrictions: No weight bearing restrictions  Other Medical Equipment (for information only, NOT a DME order):  walker  Other Treatments: ***    Patient's personal belongings (please select all that are sent with patient):  Partial        RN SIGNATURE:  Electronically signed by Lizette Vasquez RN on 4/8/25 at 1:01 PM EDT    CASE MANAGEMENT/SOCIAL WORK SECTION    Inpatient Status Date: 4/3/2025    Readmission Risk Assessment Score:  [facility] RISK OF UNPLANNED READMISSION 2.0             16.4 Total Score        Discharging to Facility/ Agency   Name: [facility]  Address:[address] [place] [address]  Phone:[phone]  Fax:[phone]    Dialysis Facility (if applicable)   Name:  Address:  Dialysis Schedule:  Phone:  Fax:    / signature: Electronically signed by Caridad Willams RN on 4/4/2025 at 12:52 PM      PHYSICIAN SECTION    Prognosis: {Prognosis:0783348244}    Condition at Discharge: { Patient Condition:707914757}    Rehab Potential (if transferring to Rehab): {Prognosis:9645491804}    Recommended Labs or Other Treatments After Discharge: ***    Physician Certification: I certify the above information and transfer of Marilu Marcos  is necessary for the continuing treatment of the diagnosis listed and that she requires {Admit to Appropriate Level of Care:05514} for {GREATER/LESS:330882525} 30 days.     Update Admission H&P: {CHP DME Changes in HandP:941691926}    PHYSICIAN SIGNATURE:  {Esignature:[id number]}

## 2025-04-05 LAB
ALBUMIN SERPL-MCNC: 3.4 G/DL (ref 3.5–5.2)
ALP SERPL-CCNC: 88 U/L (ref 35–104)
ALT SERPL-CCNC: 11 U/L (ref 0–32)
ANION GAP SERPL CALCULATED.3IONS-SCNC: 16 MMOL/L (ref 7–16)
AST SERPL-CCNC: 18 U/L (ref 0–31)
BASOPHILS # BLD: 0 K/UL (ref 0–0.2)
BASOPHILS NFR BLD: 0 % (ref 0–2)
BILIRUB SERPL-MCNC: 0.2 MG/DL (ref 0–1.2)
BUN SERPL-MCNC: 25 MG/DL (ref 6–23)
CALCIUM SERPL-MCNC: 9 MG/DL (ref 8.6–10.2)
CHLORIDE SERPL-SCNC: 101 MMOL/L (ref 98–107)
CO2 SERPL-SCNC: 18 MMOL/L (ref 22–29)
CREAT SERPL-MCNC: 0.7 MG/DL (ref 0.5–1)
EOSINOPHIL # BLD: 0.96 K/UL (ref 0.05–0.5)
EOSINOPHILS RELATIVE PERCENT: 7 % (ref 0–6)
ERYTHROCYTE [DISTWIDTH] IN BLOOD BY AUTOMATED COUNT: 14.6 % (ref 11.5–15)
GFR, ESTIMATED: 88 ML/MIN/1.73M2
GLUCOSE BLD-MCNC: 78 MG/DL (ref 74–99)
GLUCOSE BLD-MCNC: 81 MG/DL (ref 74–99)
GLUCOSE BLD-MCNC: 91 MG/DL (ref 74–99)
GLUCOSE BLD-MCNC: 92 MG/DL (ref 74–99)
GLUCOSE SERPL-MCNC: 70 MG/DL (ref 74–99)
HCT VFR BLD AUTO: 29.7 % (ref 34–48)
HGB BLD-MCNC: 10.1 G/DL (ref 11.5–15.5)
LYMPHOCYTES NFR BLD: 0.48 K/UL (ref 1.5–4)
LYMPHOCYTES RELATIVE PERCENT: 4 % (ref 20–42)
MAGNESIUM SERPL-MCNC: 2.2 MG/DL (ref 1.6–2.6)
MCH RBC QN AUTO: 38.8 PG (ref 26–35)
MCHC RBC AUTO-ENTMCNC: 34 G/DL (ref 32–34.5)
MCV RBC AUTO: 114.2 FL (ref 80–99.9)
MONOCYTES NFR BLD: 0.84 K/UL (ref 0.1–0.95)
MONOCYTES NFR BLD: 6 % (ref 2–12)
NEUTROPHILS NFR BLD: 83 % (ref 43–80)
NEUTS SEG NFR BLD: 11.42 K/UL (ref 1.8–7.3)
PLATELET # BLD AUTO: 558 K/UL (ref 130–450)
PMV BLD AUTO: 9.6 FL (ref 7–12)
POTASSIUM SERPL-SCNC: 3.5 MMOL/L (ref 3.5–5)
PROT SERPL-MCNC: 5.7 G/DL (ref 6.4–8.3)
RBC # BLD AUTO: 2.6 M/UL (ref 3.5–5.5)
RBC # BLD: ABNORMAL 10*6/UL
RBC # BLD: ABNORMAL 10*6/UL
SODIUM SERPL-SCNC: 135 MMOL/L (ref 132–146)
WBC OTHER # BLD: 13.7 K/UL (ref 4.5–11.5)

## 2025-04-05 PROCEDURE — 1200000000 HC SEMI PRIVATE

## 2025-04-05 PROCEDURE — 2500000003 HC RX 250 WO HCPCS: Performed by: SURGERY

## 2025-04-05 PROCEDURE — 85025 COMPLETE CBC W/AUTO DIFF WBC: CPT

## 2025-04-05 PROCEDURE — 80053 COMPREHEN METABOLIC PANEL: CPT

## 2025-04-05 PROCEDURE — 2580000003 HC RX 258: Performed by: SURGERY

## 2025-04-05 PROCEDURE — 99232 SBSQ HOSP IP/OBS MODERATE 35: CPT | Performed by: INTERNAL MEDICINE

## 2025-04-05 PROCEDURE — 83735 ASSAY OF MAGNESIUM: CPT

## 2025-04-05 PROCEDURE — 6370000000 HC RX 637 (ALT 250 FOR IP)

## 2025-04-05 PROCEDURE — 51701 INSERT BLADDER CATHETER: CPT

## 2025-04-05 PROCEDURE — 82962 GLUCOSE BLOOD TEST: CPT

## 2025-04-05 PROCEDURE — 51798 US URINE CAPACITY MEASURE: CPT

## 2025-04-05 PROCEDURE — 6370000000 HC RX 637 (ALT 250 FOR IP): Performed by: STUDENT IN AN ORGANIZED HEALTH CARE EDUCATION/TRAINING PROGRAM

## 2025-04-05 PROCEDURE — 6360000002 HC RX W HCPCS: Performed by: SURGERY

## 2025-04-05 PROCEDURE — 6370000000 HC RX 637 (ALT 250 FOR IP): Performed by: SURGERY

## 2025-04-05 RX ADMIN — SODIUM CHLORIDE: 9 INJECTION, SOLUTION INTRAVENOUS at 10:46

## 2025-04-05 RX ADMIN — ACETAMINOPHEN 650 MG: 325 TABLET ORAL at 16:44

## 2025-04-05 RX ADMIN — SUCRALFATE 1 G: 1 TABLET ORAL at 12:33

## 2025-04-05 RX ADMIN — DICYCLOMINE HYDROCHLORIDE 20 MG: 10 CAPSULE ORAL at 10:47

## 2025-04-05 RX ADMIN — SODIUM CHLORIDE 40 MG: 9 INJECTION INTRAMUSCULAR; INTRAVENOUS; SUBCUTANEOUS at 10:47

## 2025-04-05 RX ADMIN — DICYCLOMINE HYDROCHLORIDE 20 MG: 10 CAPSULE ORAL at 04:41

## 2025-04-05 RX ADMIN — METRONIDAZOLE 500 MG: 500 INJECTION, SOLUTION INTRAVENOUS at 10:47

## 2025-04-05 RX ADMIN — WATER 1000 MG: 1 INJECTION INTRAMUSCULAR; INTRAVENOUS; SUBCUTANEOUS at 01:36

## 2025-04-05 RX ADMIN — DICYCLOMINE HYDROCHLORIDE 20 MG: 10 CAPSULE ORAL at 22:15

## 2025-04-05 RX ADMIN — METRONIDAZOLE 500 MG: 500 INJECTION, SOLUTION INTRAVENOUS at 17:20

## 2025-04-05 RX ADMIN — SUCRALFATE 1 G: 1 TABLET ORAL at 17:19

## 2025-04-05 RX ADMIN — SUCRALFATE 1 G: 1 TABLET ORAL at 07:02

## 2025-04-05 RX ADMIN — SODIUM CHLORIDE 40 MG: 9 INJECTION INTRAMUSCULAR; INTRAVENOUS; SUBCUTANEOUS at 22:15

## 2025-04-05 RX ADMIN — SODIUM CHLORIDE: 9 INJECTION, SOLUTION INTRAVENOUS at 17:20

## 2025-04-05 RX ADMIN — SUCRALFATE 1 G: 1 TABLET ORAL at 22:15

## 2025-04-05 RX ADMIN — SODIUM CHLORIDE, PRESERVATIVE FREE 10 ML: 5 INJECTION INTRAVENOUS at 22:22

## 2025-04-05 RX ADMIN — ACETAMINOPHEN 650 MG: 325 TABLET ORAL at 10:07

## 2025-04-05 RX ADMIN — SODIUM CHLORIDE, PRESERVATIVE FREE 10 ML: 5 INJECTION INTRAVENOUS at 10:47

## 2025-04-05 RX ADMIN — METRONIDAZOLE 500 MG: 500 INJECTION, SOLUTION INTRAVENOUS at 01:36

## 2025-04-05 RX ADMIN — ZOLPIDEM TARTRATE 5 MG: 5 TABLET ORAL at 22:15

## 2025-04-05 RX ADMIN — ZOLPIDEM TARTRATE 5 MG: 5 TABLET ORAL at 01:37

## 2025-04-05 ASSESSMENT — PAIN DESCRIPTION - ORIENTATION
ORIENTATION: RIGHT;LEFT
ORIENTATION: POSTERIOR

## 2025-04-05 ASSESSMENT — PAIN DESCRIPTION - DESCRIPTORS
DESCRIPTORS: ACHING;DISCOMFORT;SORE
DESCRIPTORS: CRAMPING
DESCRIPTORS: ACHING;DISCOMFORT

## 2025-04-05 ASSESSMENT — PAIN DESCRIPTION - LOCATION
LOCATION: ABDOMEN
LOCATION: NECK;KNEE
LOCATION: NECK;BACK

## 2025-04-05 ASSESSMENT — PAIN SCALES - GENERAL
PAINLEVEL_OUTOF10: 7
PAINLEVEL_OUTOF10: 6
PAINLEVEL_OUTOF10: 6

## 2025-04-05 NOTE — PLAN OF CARE
Problem: ABCDS Injury Assessment  Goal: Absence of physical injury  Outcome: Progressing     Problem: Skin/Tissue Integrity  Goal: Skin integrity remains intact  Description: 1.  Monitor for areas of redness and/or skin breakdown  2.  Assess vascular access sites hourly  3.  Every 4-6 hours minimum:  Change oxygen saturation probe site  4.  Every 4-6 hours:  If on nasal continuous positive airway pressure, respiratory therapy assess nares and determine need for appliance change or resting period  Outcome: Progressing     Problem: Discharge Planning  Goal: Discharge to home or other facility with appropriate resources  Outcome: Progressing     Problem: Pain  Goal: Verbalizes/displays adequate comfort level or baseline comfort level  Outcome: Progressing     Problem: Chronic Conditions and Co-morbidities  Goal: Patient's chronic conditions and co-morbidity symptoms are monitored and maintained or improved  Outcome: Progressing     Problem: Safety - Adult  Goal: Free from fall injury  Outcome: Progressing     Problem: Nutrition Deficit:  Goal: Optimize nutritional status  Outcome: Progressing

## 2025-04-06 LAB
BASOPHILS # BLD: 0.22 K/UL (ref 0–0.2)
BASOPHILS NFR BLD: 2 % (ref 0–2)
EOSINOPHIL # BLD: 0.22 K/UL (ref 0.05–0.5)
EOSINOPHILS RELATIVE PERCENT: 2 % (ref 0–6)
ERYTHROCYTE [DISTWIDTH] IN BLOOD BY AUTOMATED COUNT: 14.5 % (ref 11.5–15)
GLUCOSE BLD-MCNC: 70 MG/DL (ref 74–99)
HCT VFR BLD AUTO: 30.4 % (ref 34–48)
HGB BLD-MCNC: 10.1 G/DL (ref 11.5–15.5)
LYMPHOCYTES NFR BLD: 0.22 K/UL (ref 1.5–4)
LYMPHOCYTES RELATIVE PERCENT: 2 % (ref 20–42)
MCH RBC QN AUTO: 38.8 PG (ref 26–35)
MCHC RBC AUTO-ENTMCNC: 33.2 G/DL (ref 32–34.5)
MCV RBC AUTO: 116.9 FL (ref 80–99.9)
MONOCYTES NFR BLD: 1.46 K/UL (ref 0.1–0.95)
MONOCYTES NFR BLD: 11 % (ref 2–12)
MYELOCYTES ABSOLUTE COUNT: 0.11 K/UL
MYELOCYTES: 1 %
NEUTROPHILS NFR BLD: 83 % (ref 43–80)
NEUTS SEG NFR BLD: 10.55 K/UL (ref 1.8–7.3)
PLATELET # BLD AUTO: 609 K/UL (ref 130–450)
PMV BLD AUTO: 9.8 FL (ref 7–12)
RBC # BLD AUTO: 2.6 M/UL (ref 3.5–5.5)
RBC # BLD: ABNORMAL 10*6/UL
WBC OTHER # BLD: 12.8 K/UL (ref 4.5–11.5)

## 2025-04-06 PROCEDURE — 6370000000 HC RX 637 (ALT 250 FOR IP): Performed by: SURGERY

## 2025-04-06 PROCEDURE — 2500000003 HC RX 250 WO HCPCS: Performed by: SURGERY

## 2025-04-06 PROCEDURE — 6360000002 HC RX W HCPCS: Performed by: SURGERY

## 2025-04-06 PROCEDURE — 82962 GLUCOSE BLOOD TEST: CPT

## 2025-04-06 PROCEDURE — 85025 COMPLETE CBC W/AUTO DIFF WBC: CPT

## 2025-04-06 PROCEDURE — 99232 SBSQ HOSP IP/OBS MODERATE 35: CPT | Performed by: INTERNAL MEDICINE

## 2025-04-06 PROCEDURE — 51798 US URINE CAPACITY MEASURE: CPT

## 2025-04-06 PROCEDURE — 2580000003 HC RX 258: Performed by: SURGERY

## 2025-04-06 PROCEDURE — 6370000000 HC RX 637 (ALT 250 FOR IP): Performed by: STUDENT IN AN ORGANIZED HEALTH CARE EDUCATION/TRAINING PROGRAM

## 2025-04-06 PROCEDURE — 6370000000 HC RX 637 (ALT 250 FOR IP)

## 2025-04-06 PROCEDURE — 1200000000 HC SEMI PRIVATE

## 2025-04-06 RX ADMIN — SUCRALFATE 1 G: 1 TABLET ORAL at 08:19

## 2025-04-06 RX ADMIN — SUCRALFATE 1 G: 1 TABLET ORAL at 16:44

## 2025-04-06 RX ADMIN — SODIUM CHLORIDE, PRESERVATIVE FREE 10 ML: 5 INJECTION INTRAVENOUS at 08:20

## 2025-04-06 RX ADMIN — SODIUM CHLORIDE, PRESERVATIVE FREE 10 ML: 5 INJECTION INTRAVENOUS at 21:40

## 2025-04-06 RX ADMIN — METRONIDAZOLE 500 MG: 500 INJECTION, SOLUTION INTRAVENOUS at 11:03

## 2025-04-06 RX ADMIN — WATER 1000 MG: 1 INJECTION INTRAMUSCULAR; INTRAVENOUS; SUBCUTANEOUS at 02:30

## 2025-04-06 RX ADMIN — SODIUM CHLORIDE 40 MG: 9 INJECTION INTRAMUSCULAR; INTRAVENOUS; SUBCUTANEOUS at 11:00

## 2025-04-06 RX ADMIN — ACETAMINOPHEN 650 MG: 325 TABLET ORAL at 16:44

## 2025-04-06 RX ADMIN — METRONIDAZOLE 500 MG: 500 INJECTION, SOLUTION INTRAVENOUS at 02:30

## 2025-04-06 RX ADMIN — ZOLPIDEM TARTRATE 5 MG: 5 TABLET ORAL at 21:36

## 2025-04-06 RX ADMIN — SUCRALFATE 1 G: 1 TABLET ORAL at 11:00

## 2025-04-06 RX ADMIN — SODIUM CHLORIDE 40 MG: 9 INJECTION INTRAMUSCULAR; INTRAVENOUS; SUBCUTANEOUS at 21:37

## 2025-04-06 RX ADMIN — ACETAMINOPHEN 650 MG: 325 TABLET ORAL at 05:28

## 2025-04-06 RX ADMIN — METRONIDAZOLE 500 MG: 500 INJECTION, SOLUTION INTRAVENOUS at 16:52

## 2025-04-06 RX ADMIN — ACETAMINOPHEN 650 MG: 325 TABLET ORAL at 21:36

## 2025-04-06 ASSESSMENT — PAIN SCALES - GENERAL
PAINLEVEL_OUTOF10: 0
PAINLEVEL_OUTOF10: 3
PAINLEVEL_OUTOF10: 9
PAINLEVEL_OUTOF10: 8
PAINLEVEL_OUTOF10: 0

## 2025-04-06 ASSESSMENT — PAIN DESCRIPTION - DESCRIPTORS
DESCRIPTORS: ACHING;DISCOMFORT
DESCRIPTORS: THROBBING
DESCRIPTORS: CRAMPING

## 2025-04-06 ASSESSMENT — PAIN DESCRIPTION - LOCATION
LOCATION: ABDOMEN
LOCATION: NECK
LOCATION: HEAD

## 2025-04-06 ASSESSMENT — PAIN DESCRIPTION - ORIENTATION: ORIENTATION: MID

## 2025-04-07 LAB
ANION GAP SERPL CALCULATED.3IONS-SCNC: 16 MMOL/L (ref 7–16)
BASOPHILS # BLD: 0 K/UL (ref 0–0.2)
BASOPHILS NFR BLD: 0 % (ref 0–2)
BUN SERPL-MCNC: 11 MG/DL (ref 6–23)
CALCIUM SERPL-MCNC: 9.4 MG/DL (ref 8.6–10.2)
CHLORIDE SERPL-SCNC: 102 MMOL/L (ref 98–107)
CO2 SERPL-SCNC: 19 MMOL/L (ref 22–29)
CREAT SERPL-MCNC: 0.5 MG/DL (ref 0.5–1)
EOSINOPHIL # BLD: 0.7 K/UL (ref 0.05–0.5)
EOSINOPHILS RELATIVE PERCENT: 5 % (ref 0–6)
ERYTHROCYTE [DISTWIDTH] IN BLOOD BY AUTOMATED COUNT: 14.6 % (ref 11.5–15)
GFR, ESTIMATED: >90 ML/MIN/1.73M2
GLUCOSE SERPL-MCNC: 94 MG/DL (ref 74–99)
HCT VFR BLD AUTO: 35.1 % (ref 34–48)
HGB BLD-MCNC: 11.7 G/DL (ref 11.5–15.5)
LYMPHOCYTES NFR BLD: 0.23 K/UL (ref 1.5–4)
LYMPHOCYTES RELATIVE PERCENT: 2 % (ref 20–42)
MCH RBC QN AUTO: 38.6 PG (ref 26–35)
MCHC RBC AUTO-ENTMCNC: 33.3 G/DL (ref 32–34.5)
MCV RBC AUTO: 115.8 FL (ref 80–99.9)
MONOCYTES NFR BLD: 0.94 K/UL (ref 0.1–0.95)
MONOCYTES NFR BLD: 7 % (ref 2–12)
MYELOCYTES ABSOLUTE COUNT: 0.12 K/UL
MYELOCYTES: 1 %
NEUTROPHILS NFR BLD: 85 % (ref 43–80)
NEUTS SEG NFR BLD: 11.5 K/UL (ref 1.8–7.3)
PLATELET # BLD AUTO: 761 K/UL (ref 130–450)
PMV BLD AUTO: 9.7 FL (ref 7–12)
POTASSIUM SERPL-SCNC: 3.6 MMOL/L (ref 3.5–5)
RBC # BLD AUTO: 3.03 M/UL (ref 3.5–5.5)
RBC # BLD: ABNORMAL 10*6/UL
SODIUM SERPL-SCNC: 137 MMOL/L (ref 132–146)
WBC OTHER # BLD: 13.5 K/UL (ref 4.5–11.5)

## 2025-04-07 PROCEDURE — 2580000003 HC RX 258: Performed by: SURGERY

## 2025-04-07 PROCEDURE — 2500000003 HC RX 250 WO HCPCS: Performed by: SURGERY

## 2025-04-07 PROCEDURE — 36415 COLL VENOUS BLD VENIPUNCTURE: CPT

## 2025-04-07 PROCEDURE — 85025 COMPLETE CBC W/AUTO DIFF WBC: CPT

## 2025-04-07 PROCEDURE — 99232 SBSQ HOSP IP/OBS MODERATE 35: CPT | Performed by: INTERNAL MEDICINE

## 2025-04-07 PROCEDURE — 80048 BASIC METABOLIC PNL TOTAL CA: CPT

## 2025-04-07 PROCEDURE — 6370000000 HC RX 637 (ALT 250 FOR IP)

## 2025-04-07 PROCEDURE — 1200000000 HC SEMI PRIVATE

## 2025-04-07 PROCEDURE — 6370000000 HC RX 637 (ALT 250 FOR IP): Performed by: STUDENT IN AN ORGANIZED HEALTH CARE EDUCATION/TRAINING PROGRAM

## 2025-04-07 PROCEDURE — 51798 US URINE CAPACITY MEASURE: CPT

## 2025-04-07 PROCEDURE — 6370000000 HC RX 637 (ALT 250 FOR IP): Performed by: SURGERY

## 2025-04-07 PROCEDURE — 6360000002 HC RX W HCPCS: Performed by: SURGERY

## 2025-04-07 RX ADMIN — SUCRALFATE 1 G: 1 TABLET ORAL at 17:32

## 2025-04-07 RX ADMIN — METRONIDAZOLE 500 MG: 500 INJECTION, SOLUTION INTRAVENOUS at 09:52

## 2025-04-07 RX ADMIN — ACETAMINOPHEN 650 MG: 325 TABLET ORAL at 14:05

## 2025-04-07 RX ADMIN — SODIUM CHLORIDE 40 MG: 9 INJECTION INTRAMUSCULAR; INTRAVENOUS; SUBCUTANEOUS at 09:51

## 2025-04-07 RX ADMIN — ACETAMINOPHEN 650 MG: 325 TABLET ORAL at 20:24

## 2025-04-07 RX ADMIN — METRONIDAZOLE 500 MG: 500 INJECTION, SOLUTION INTRAVENOUS at 17:33

## 2025-04-07 RX ADMIN — SUCRALFATE 1 G: 1 TABLET ORAL at 06:14

## 2025-04-07 RX ADMIN — SODIUM CHLORIDE, PRESERVATIVE FREE 10 ML: 5 INJECTION INTRAVENOUS at 09:55

## 2025-04-07 RX ADMIN — SUCRALFATE 1 G: 1 TABLET ORAL at 00:11

## 2025-04-07 RX ADMIN — METRONIDAZOLE 500 MG: 500 INJECTION, SOLUTION INTRAVENOUS at 02:26

## 2025-04-07 RX ADMIN — WATER 1000 MG: 1 INJECTION INTRAMUSCULAR; INTRAVENOUS; SUBCUTANEOUS at 02:19

## 2025-04-07 RX ADMIN — SODIUM CHLORIDE, PRESERVATIVE FREE 10 ML: 5 INJECTION INTRAVENOUS at 20:19

## 2025-04-07 RX ADMIN — SODIUM CHLORIDE 40 MG: 9 INJECTION INTRAMUSCULAR; INTRAVENOUS; SUBCUTANEOUS at 20:19

## 2025-04-07 RX ADMIN — SUCRALFATE 1 G: 1 TABLET ORAL at 14:00

## 2025-04-07 RX ADMIN — ZOLPIDEM TARTRATE 5 MG: 5 TABLET ORAL at 20:19

## 2025-04-07 ASSESSMENT — PAIN DESCRIPTION - DESCRIPTORS: DESCRIPTORS: ACHING;NAGGING

## 2025-04-07 ASSESSMENT — PAIN DESCRIPTION - LOCATION: LOCATION: GENERALIZED;NECK

## 2025-04-07 ASSESSMENT — PAIN SCALES - GENERAL: PAINLEVEL_OUTOF10: 9

## 2025-04-07 NOTE — CONSULTS
4/7/2025 2:14 PM  Marilu Marcos  [id number]     Chief Complaint:    Urinary retention      History of Present Illness:      The patient is a 79 y.o. female patient who presented to the hospital with complaints of melena and abdominal pain.  She was taken to the OR for EGD with general surgery. Urology is asked to evaluate for urinary retention. She has been straight cathed for volumes of 560ml and 350ml. She has never seen a urologist in the past. Denies any gross hematuria. She is not having pain when she was having incomplete emptying.   She did just void approximately 250 ml per nursing. She denies any other urologic issues.       Past Medical History:   Diagnosis Date    Arthritis     Crohn disease (HCC)     Diabetes mellitus (HCC)     Hyperlipidemia     Hypertension     Insomnia     Myelodysplastic disease (HCC)     Ulcerative colitis with intestinal obstruction (HCC)     Unspecified diseases of blood and blood-forming organs          Past Surgical History:   Procedure Laterality Date    ADENOIDECTOMY      CARPAL TUNNEL RELEASE Right     CERVICAL FUSION      COLON SURGERY      COLOSTOMY      EYE SURGERY Bilateral     CATARACT    HERNIA REPAIR      HIP FRACTURE SURGERY Left 9/3/2022    HIP OPEN REDUCTION INTERNAL FIXATION WITH CEPHALOMEDULLARY NAIL performed by Juan Carlos Altamirano MD at [facility] OR    JOINT REPLACEMENT      rt and lt    JOINT REPLACEMENT Bilateral     JOINT REPLACEMENT Right     TONSILLECTOMY      TUBAL LIGATION      UPPER GASTROINTESTINAL ENDOSCOPY N/A 4/3/2025    ESOPHAGOGASTRODUODENOSCOPY BIOPSY performed by Rosalio Mcadams MD at [facility] ENDOSCOPY       Medications Prior to Admission:    Medications Prior to Admission: hydroxyurea (HYDREA) 500 MG chemo capsule, Take 1 capsule by mouth every morning  HYDROcodone-acetaminophen (NORCO) 7.5-325 MG per tablet, Take 1 tablet by mouth 4 times daily for 30 days. Max Daily Amount: 4 tablets  furosemide (LASIX) 20 MG tablet, Take 1 tablet by mouth daily

## 2025-04-07 NOTE — CARE COORDINATION
Transition of Care-Discharge plan is [facility]. Therapy scores in EPIC. [facility] accepted,  ALLEGRA, HENs and Len form completed.       Latasha BENJAMINN, RN  [facility]

## 2025-04-08 VITALS
WEIGHT: 121.1 LBS | DIASTOLIC BLOOD PRESSURE: 57 MMHG | OXYGEN SATURATION: 100 % | HEART RATE: 69 BPM | RESPIRATION RATE: 18 BRPM | BODY MASS INDEX: 22.28 KG/M2 | HEIGHT: 62 IN | SYSTOLIC BLOOD PRESSURE: 129 MMHG | TEMPERATURE: 98.1 F

## 2025-04-08 PROCEDURE — 2500000003 HC RX 250 WO HCPCS: Performed by: SURGERY

## 2025-04-08 PROCEDURE — 97535 SELF CARE MNGMENT TRAINING: CPT

## 2025-04-08 PROCEDURE — 6360000002 HC RX W HCPCS: Performed by: SURGERY

## 2025-04-08 PROCEDURE — 6370000000 HC RX 637 (ALT 250 FOR IP): Performed by: STUDENT IN AN ORGANIZED HEALTH CARE EDUCATION/TRAINING PROGRAM

## 2025-04-08 PROCEDURE — 97530 THERAPEUTIC ACTIVITIES: CPT

## 2025-04-08 PROCEDURE — 6370000000 HC RX 637 (ALT 250 FOR IP): Performed by: SURGERY

## 2025-04-08 PROCEDURE — 99239 HOSP IP/OBS DSCHRG MGMT >30: CPT | Performed by: STUDENT IN AN ORGANIZED HEALTH CARE EDUCATION/TRAINING PROGRAM

## 2025-04-08 PROCEDURE — 2580000003 HC RX 258: Performed by: SURGERY

## 2025-04-08 RX ORDER — PANTOPRAZOLE SODIUM 40 MG/1
40 TABLET, DELAYED RELEASE ORAL
DISCHARGE
Start: 2025-04-08

## 2025-04-08 RX ORDER — SUCRALFATE 1 G/1
1 TABLET ORAL 4 TIMES DAILY
Refills: 0 | DISCHARGE
Start: 2025-04-08 | End: 2025-04-22

## 2025-04-08 RX ORDER — ASPIRIN 325 MG
325 TABLET, DELAYED RELEASE (ENTERIC COATED) ORAL DAILY
DISCHARGE
Start: 2025-04-08 | End: 2025-04-08 | Stop reason: HOSPADM

## 2025-04-08 RX ORDER — OXYCODONE AND ACETAMINOPHEN 5; 325 MG/1; MG/1
1 TABLET ORAL EVERY 6 HOURS PRN
Qty: 28 TABLET | Refills: 0 | Status: SHIPPED | OUTPATIENT
Start: 2025-04-08 | End: 2025-04-15

## 2025-04-08 RX ORDER — OXYCODONE AND ACETAMINOPHEN 5; 325 MG/1; MG/1
1 TABLET ORAL ONCE
Refills: 0 | Status: COMPLETED | OUTPATIENT
Start: 2025-04-08 | End: 2025-04-08

## 2025-04-08 RX ORDER — ASPIRIN 81 MG/1
81 TABLET ORAL DAILY
DISCHARGE
Start: 2025-04-08

## 2025-04-08 RX ADMIN — SUCRALFATE 1 G: 1 TABLET ORAL at 01:31

## 2025-04-08 RX ADMIN — SUCRALFATE 1 G: 1 TABLET ORAL at 12:28

## 2025-04-08 RX ADMIN — OXYCODONE HYDROCHLORIDE AND ACETAMINOPHEN 1 TABLET: 5; 325 TABLET ORAL at 09:35

## 2025-04-08 RX ADMIN — WATER 1000 MG: 1 INJECTION INTRAMUSCULAR; INTRAVENOUS; SUBCUTANEOUS at 01:31

## 2025-04-08 RX ADMIN — SODIUM CHLORIDE 40 MG: 9 INJECTION INTRAMUSCULAR; INTRAVENOUS; SUBCUTANEOUS at 09:29

## 2025-04-08 RX ADMIN — ACETAMINOPHEN 650 MG: 325 TABLET ORAL at 12:28

## 2025-04-08 RX ADMIN — METRONIDAZOLE 500 MG: 500 INJECTION, SOLUTION INTRAVENOUS at 09:29

## 2025-04-08 RX ADMIN — ACETAMINOPHEN 650 MG: 325 TABLET ORAL at 03:13

## 2025-04-08 RX ADMIN — METRONIDAZOLE 500 MG: 500 INJECTION, SOLUTION INTRAVENOUS at 01:34

## 2025-04-08 RX ADMIN — SUCRALFATE 1 G: 1 TABLET ORAL at 05:41

## 2025-04-08 RX ADMIN — SODIUM CHLORIDE, PRESERVATIVE FREE 10 ML: 5 INJECTION INTRAVENOUS at 09:37

## 2025-04-08 ASSESSMENT — PAIN DESCRIPTION - DESCRIPTORS: DESCRIPTORS: ACHING;DISCOMFORT;SORE

## 2025-04-08 ASSESSMENT — PAIN SCALES - GENERAL
PAINLEVEL_OUTOF10: 3
PAINLEVEL_OUTOF10: 6
PAINLEVEL_OUTOF10: 9

## 2025-04-08 ASSESSMENT — PAIN DESCRIPTION - LOCATION: LOCATION: GENERALIZED;NECK

## 2025-04-08 NOTE — PROGRESS NOTES
[facility]   Progress Note    Admitting Date and Time: 4/2/2025  6:21 PM  Admit Dx: Melena [K92.1]  Duodenitis [K29.80]    Subjective:    Patient was admitted with Melena [K92.1]  Duodenitis [K29.80]. Patient denies fever, chills, cp, sob, n/v.     sodium chloride flush  5-40 mL IntraVENous 2 times per day    pantoprazole (PROTONIX) 40 mg in sodium chloride (PF) 0.9 % 10 mL injection  40 mg IntraVENous Q12H    cefTRIAXone (ROCEPHIN) IV  1,000 mg IntraVENous Q24H    metroNIDAZOLE  500 mg IntraVENous Q8H    sucralfate  1 g Oral 4 times per day     ondansetron, 4 mg, Q6H PRN  prochlorperazine, 10 mg, Q6H PRN  sodium chloride flush, 5-40 mL, PRN  sodium chloride, , PRN  polyethylene glycol, 17 g, Daily PRN  acetaminophen, 650 mg, Q6H PRN   Or  acetaminophen, 650 mg, Q6H PRN  glucose, 4 tablet, PRN  dextrose bolus, 125 mL, PRN   Or  dextrose bolus, 250 mL, PRN  glucagon (rDNA), 1 mg, PRN  dextrose, , Continuous PRN  dicyclomine, 20 mg, 4x Daily PRN  zolpidem, 5 mg, Nightly PRN         Objective:    BP (!) 118/97   Pulse 79   Temp 98.2 °F (36.8 °C) (Oral)   Resp 18   Ht 1.575 m (5' 2\")   Wt 54.9 kg (121 lb 1.6 oz)   SpO2 97%   BMI 22.15 kg/m²   Skin: warm and dry, no rash or erythema  Pulmonary/Chest: clear to auscultation bilaterally- no wheezes, rales or rhonchi, normal air movement, no respiratory distress  Cardiovascular: rhythm reg at rate of 80  Abdomen: soft, non-tender, non-distended, normal bowel sounds, no masses or organomegaly  Extremities: no cyanosis, no clubbing, and no edema      Recent Labs     04/04/25  0555 04/05/25  0309    135   K 3.8 3.5    101   CO2 19* 18*   BUN 37* 25*   CREATININE 0.6 0.7   GLUCOSE 96 70*   CALCIUM 9.3 9.0       Recent Labs     04/04/25  0555 04/05/25  0309 04/06/25  0430   WBC 17.4* 13.7* 12.8*   RBC 2.90* 2.60* 2.60*   HGB 11.2* 10.1* 10.1*   HCT 33.0* 29.7* 30.4*   .8* 114.2* 116.9*   MCH 38.6* 38.8* 38.8*   MCHC 33.9 
     [facility]   Progress Note    Admitting Date and Time: 4/2/2025  6:21 PM  Admit Dx: Melena [K92.1]  Duodenitis [K29.80]    Subjective:    Patient was admitted with Melena [K92.1]  Duodenitis [K29.80]. Patient denies fever, chills, cp, sob, n/v.     sodium chloride flush  5-40 mL IntraVENous 2 times per day    pantoprazole (PROTONIX) 40 mg in sodium chloride (PF) 0.9 % 10 mL injection  40 mg IntraVENous Q12H    cefTRIAXone (ROCEPHIN) IV  1,000 mg IntraVENous Q24H    metroNIDAZOLE  500 mg IntraVENous Q8H    insulin lispro  0-4 Units SubCUTAneous 4x Daily AC & HS    sucralfate  1 g Oral 4 times per day     ondansetron, 4 mg, Q6H PRN  prochlorperazine, 10 mg, Q6H PRN  sodium chloride flush, 5-40 mL, PRN  sodium chloride, , PRN  polyethylene glycol, 17 g, Daily PRN  acetaminophen, 650 mg, Q6H PRN   Or  acetaminophen, 650 mg, Q6H PRN  glucose, 4 tablet, PRN  dextrose bolus, 125 mL, PRN   Or  dextrose bolus, 250 mL, PRN  glucagon (rDNA), 1 mg, PRN  dextrose, , Continuous PRN  dicyclomine, 20 mg, 4x Daily PRN  zolpidem, 5 mg, Nightly PRN         Objective:    BP (!) 142/72   Pulse 80   Temp 97.7 °F (36.5 °C) (Oral)   Resp 18   Ht 1.575 m (5' 2\")   Wt 49.5 kg (109 lb 3.2 oz)   SpO2 97%   BMI 19.97 kg/m²   Skin: warm and dry, no rash or erythema  Pulmonary/Chest: clear to auscultation bilaterally- no wheezes, rales or rhonchi, normal air movement, no respiratory distress  Cardiovascular: rhythm reg at rate of 84  Abdomen: soft, non-tender, non-distended, normal bowel sounds, no masses or organomegaly  Extremities: no cyanosis, no clubbing, and no edema      Recent Labs     04/03/25  0539 04/04/25  0555 04/05/25  0309    136 135   K 3.9 3.8 3.5    102 101   CO2 19* 19* 18*   BUN 49* 37* 25*   CREATININE 0.7 0.6 0.7   GLUCOSE 95 96 70*   CALCIUM 9.4 9.3 9.0       Recent Labs     04/02/25  1853 04/04/25  0555 04/05/25  0309   WBC 22.4* 17.4* 13.7*   RBC 3.11* 2.90* 2.60*   HGB 
     [facility] Hospitalist   Progress Note    Admitting Date and Time: 4/2/2025  6:21 PM  Admit Dx: Melena [K92.1]  Duodenitis [K29.80]    Subjective:    Patient was admitted with Melena [K92.1]  Duodenitis [K29.80]. Patient denies fever, chills, cp, sob, n/v.     sodium chloride flush  5-40 mL IntraVENous 2 times per day    pantoprazole (PROTONIX) 40 mg in sodium chloride (PF) 0.9 % 10 mL injection  40 mg IntraVENous Q12H    cefTRIAXone (ROCEPHIN) IV  1,000 mg IntraVENous Q24H    metroNIDAZOLE  500 mg IntraVENous Q8H    insulin lispro  0-4 Units SubCUTAneous 4x Daily AC & HS    sucralfate  1 g Oral 4 times per day     sodium chloride flush, 5-40 mL, PRN  sodium chloride, , PRN  polyethylene glycol, 17 g, Daily PRN  acetaminophen, 650 mg, Q6H PRN   Or  acetaminophen, 650 mg, Q6H PRN  glucose, 4 tablet, PRN  dextrose bolus, 125 mL, PRN   Or  dextrose bolus, 250 mL, PRN  glucagon (rDNA), 1 mg, PRN  dextrose, , Continuous PRN  dicyclomine, 20 mg, 4x Daily PRN         Objective:    BP (!) 158/60   Pulse 64   Temp 97.5 °F (36.4 °C) (Oral)   Resp 19   Ht 1.575 m (5' 2\")   Wt 53.1 kg (117 lb)   SpO2 100%   BMI 21.40 kg/m²   Skin: warm and dry, no rash or erythema  Pulmonary/Chest: clear to auscultation bilaterally- no wheezes, rales or rhonchi, normal air movement, no respiratory distress  Cardiovascular: rhythm reg at rate of 60  Abdomen: soft, non-tender, non-distended, normal bowel sounds, no masses or organomegaly  Extremities: no cyanosis, no clubbing, and no edema      Recent Labs     04/02/25 1853 04/03/25  0539    134   K 4.7 3.9   CL 96* 100   CO2 23 19*   BUN 54* 49*   CREATININE 0.9 0.7   GLUCOSE 143* 95   CALCIUM 10.0 9.4       Recent Labs     04/02/25 1853   WBC 22.4*   RBC 3.11*   HGB 12.1   HCT 35.9   .4*   MCH 38.9*   MCHC 33.7   RDW 14.4   *   MPV 9.8       CMP:    Lab Results   Component Value Date/Time     04/03/2025 05:39 AM    K 3.9 04/03/2025 05:39 AM 
4 Eyes Skin Assessment     NAME:  Marilu Marcos  YOB: 1945  MEDICAL RECORD NUMBER:  [id number]    The patient is being assessed for  Admission    I agree that at least one RN has performed a thorough Head to Toe Skin Assessment on the patient. ALL assessment sites listed below have been assessed.      Areas assessed by both nurses:    Head, Face, Ears, Shoulders, Back, Chest, Arms, Elbows, Hands, Sacrum. Buttock, Coccyx, Ischium, and Legs. Feet and Heels        Does the Patient have a Wound? No noted wound(s)     Red buttocks- Nonblanchable     [place] Prevention initiated by RN: No  Wound Care Orders initiated by RN: No    Pressure Injury (Stage 3,4, Unstageable, DTI, NWPT, and Complex wounds) if present, place Wound referral order by RN under : No    New Ostomies, if present place, Ostomy referral order under : Yes    Nurse 1 eSignature: Electronically signed by Liliana Mooney RN on 4/3/25 at 6:42 PM EDT    **SHARE this note so that the co-signing nurse can place an eSignature**    Nurse 2 eSignature: Electronically signed by Jigna Salazar RN on 4/3/25 at 6:47 PM EDT    
Alberto Faustin APRN for concerns of retaining urine. Bladder scan result is 540 ml. Straight Cath is ordered, output is 560 ml. Pt denies any feeling of fullness.     Alberto Faustin APRN again for concerns of urinary retention. Bladder scan result is 443 ml. Ordered straight cath. Straight cath output is 350 ml. Post void cath residual is 93 ml.    
Bladder scanned patient with 695 ml in bladder, straight cath ordered, obtained 800 ml of clear yellow urine. Bladder scan post straight cath was 0 ml.  
Database initiated. Patient is A&O comes in from home alone. States she uses a cane and a walker and is RA at baseline. She has fallen a lot recently.     
General Surgery   Daily Progress Note      Patient's Name/Date of Birth: Marilu Marcos / 1945    Date: April 4, 2025       Patient Active Problem List   Diagnosis    Crohn's colitis, unspecified complication (HCC)    Acute blood loss anemia    Anorectal pain    Anal stricture    Attention to ileostomy (HCC)    CAD (coronary artery disease), native coronary artery    Chronic back pain    Crohn's disease without complication (HCC)    Demand ischemia (HCC)    Dyslipidemia    Fatigue associated with anemia    History of anticoagulant therapy    History of Nikolai's disease    History of total right hip replacement    History of ulcerative colitis    Hypertension    Myeloproliferative disorder (HCC)    Other hyperlipidemia    Pain of right fibula    Pressure ulcer of buttock    Severe protein-calorie malnutrition    Type 2 diabetes mellitus with diabetic nephropathy (HCC)    Coronary artery disease involving native coronary artery with angina pectoris, unspecified whether native or transplanted heart    Major depressive disorder, recurrent, mild    Major depressive disorder, recurrent, unspecified    Closed intertrochanteric fracture, left, initial encounter    Melena    Duodenitis    Thrombocytosis    Controlled type 2 diabetes mellitus without complication    Acidosis    Duodenal ulcer       Subjective: says she is starting to feel nauseated and bloated over the past hour. Otherwise no issues.     Objective:  BP (!) 158/60   Pulse 64   Temp 97.5 °F (36.4 °C) (Oral)   Resp 19   Ht 1.575 m (5' 2\")   Wt 53.1 kg (117 lb)   SpO2 100%   BMI 21.40 kg/m²   Labs:  Recent Labs     04/02/25 1853   WBC 22.4*   HGB 12.1   HCT 35.9     Recent Labs     04/02/25 1853 04/03/25  0539    134   K 4.7 3.9   CL 96* 100   CO2 23 19*   BUN 54* 49*   CREATININE 0.9 0.7     Recent Labs     04/02/25 1853 04/03/25  0539   ALKPHOS 145* 102   ALT 17 15   AST 25 22   BILITOT 0.5 0.3   BILIDIR <0.2  --          General appearance: 
Initial ET Nurse  Admit Date: 4/2/2025  6:21 PM    Reason for consult:  LLQ ostomy    Significant history:  adm with melena from ileostomy    Stoma assessment:  pouch intact.   Patient awake and verbal  States she in independent with ostomy until she became sick.  Plan:  routine ostomy care per staff    Rosalba Eric RN 4/4/2025 12:28 PM   
Message sent to surgical resident about CXR and KUB results to see if patient can have CLD again.   
Patient bladder scanned for 707cc, voided 300,   
Patient voided 200cc, PVR was 217cc.  
Pt continues to report continued period like cramping -- When asked how she was tolerating the clear liquids, pt states the clear liquid diet is not really doing much for her as she does not have a desire to have broth without any thing in it or juice -- would like to see if diet could be advanced to full liquids.  Call placed to Dr. Mcadams's group; page sent out by on-call service to Dr. Augustin.  Await return call.     10:54 PM--Call returned from Dr. Augustin; pt to remain CLD until Dr. Mcadams see's again and has any further plans.  
Pt reporting cramping and stated \"I think we're going to need to do that catheter again.\"  Bladder scan is showing 465cc of urine retention.  Perfect serve sent to attending.  
Revisit ileostomy. Independent in care at home. States staff changed  the other day.plans for facility transfer today. Patient denies needs  Rosalba Eric, CNS, Wound Care    
Significantly improved  Pt denies abdominal pain this morning  Wants to eat  Afebrile  Abdomen soft and non-distended; mild tenderness in epigastrium to deep palpation  Hb 10, WBC down to 13k  Advance diet  Continue IV PPI  JSGadyMD    
Spiritual Health History and Assessment/Progress Note  [facility]    Initial Encounter,  ,  ,      Name: Marilu Marcos MRN: [id number]    Age: 79 y.o.     Sex: female   Language: English   Yazidi: None   Melena     Date: 4/5/2025                           Spiritual Assessment began in [facility] MED SURG/TELE        Referral/Consult From: Rounding   Encounter Overview/Reason: Initial Encounter  Service Provided For: Patient    Gretta, Belief, Meaning:   Patient has beliefs or practices that help with coping during difficult times  Family/Friends No family/friends present      Importance and Influence:  Patient has spiritual/personal beliefs that influence decisions regarding their health  Family/Friends No family/friends present    Community:  Patient feels well-supported. Support system includes: Children  Family/Friends No family/friends present    Assessment and Plan of Care:     Patient Interventions include: Facilitated expression of thoughts and feelings  Family/Friends Interventions include: No family/friends present    Patient Plan of Care: Spiritual Care available upon further referral  Family/Friends Plan of Care: No family/friends present    Electronically signed by Chaplain Shashi on 4/5/2025 at 2:07 PM   
           BMI Categories: Underweight (BMI less than 22) age over 65    Estimated Daily Nutrient Needs:  Energy Requirements Based On: Formula  Weight Used for Energy Requirements: Current  Energy (kcal/day): 6060-9232  Weight Used for Protein Requirements: Current  Protein (g/day): 60-70  Method Used for Fluid Requirements: 1 ml/kcal  Fluid (ml/day): 0352-9437 ml    Nutrition Diagnosis:   Severe malnutrition, in context of acute illness or injury related to inadequate protein-energy intake as evidenced by criteria as identified in malnutrition assessment    Nutrition Interventions:   Food and/or Nutrient Delivery: Continue Current Diet, Start Oral Nutrition Supplement  Nutrition Education/Counseling: No recommendation at this time  Coordination of Nutrition Care: Continue to monitor while inpatient       Goals:  Goals: Other, by next RD assessment (nutrition progression)  Type of Goal: New goal  Previous Goal Met: New Goal    Nutrition Monitoring and Evaluation:   Behavioral-Environmental Outcomes: None Identified  Food/Nutrient Intake Outcomes: Progression of Nutrition, Diet Advancement/Tolerance, Food and Nutrient Intake, Supplement Intake  Physical Signs/Symptoms Outcomes: Biochemical Data, GI Status, Fluid Status or Edema, Nutrition Focused Physical Findings, Skin, Weight    Discharge Planning:    Too soon to determine     ROBERTO GAN MPH, RD, LD  Contact: x [phone]    
bedside           UE ROM/strength  AROM present throughout   Weakness   limited shoulder ROM.  Instructed with table top stretches with pt able to complete 8 then too tired to complete full rep of 10.  Encouraged to complete AROM while seated in the chair throughout the day.  Tolerate UE therapeutic activity/exercises to increase strength/endurance for ADL/xfer activity  Education       Comments:  Pt sitting in the chair upon therapist arrival.  She states she does not like to complain however much time taken to listen to her several complaints.  She demonstrated limited tolerance for activity due to fatigue.  Limited ADL tolerance while seated in the chair.  Stood for 1 minute then too tired to stand longer.  She was encouraged to increase movement throughout the day within her tolerance.  She remained in the chair with alarm activated.  Nursing notified.    Education/treatment:  ADL retraining with facilitation of movement to increase self care skills. Therapeutic activity to address balance and endurance for ADL and transfers.  Pt education of walker safety, transfer safety, UE ROM.        Pt has made  progress towards set goals.       Time In: 845  Time Out: 909     Min Units   Therapeutic Ex 13660 10    Therapeutic Activities 62511 5    ADL/Self Care 32201 9    Orthotic Management 07923     Neuro Re-Ed 48897     Non-Billable Time     TOTAL TIMED TREATMENT 24 2         Severo FRIEDMAN/L [id number]    
breakdown and contractures  [x] Safety and Education Training   [x] Patient/Caregiver Education   [] HEP  [] Other     PT long term treatment goals are located in above grid    Frequency of treatments: 2-5x/week x 5-7 days.    Time in  905  Time out  920        Evaluation Time includes thorough review of current medical information, gathering information on past medical history/social history and prior level of function, completion of standardized testing/informal observation of tasks, assessment of data and education on plan of care and goals.      CPT codes:  [x] Low Complexity PT evaluation 37609  [] Moderate Complexity PT evaluation 42251  [] High Complexity PT evaluation 00572  [] PT Re-evaluation 66503  [] Gait training 51739 minutes  [] Manual therapy 99721 minutes  [] Therapeutic activities 69192 minutes  [] Therapeutic exercises 33620 minutes  [] Neuromuscular reeducation 16412 minutes     Doris Reece PT [id number]  
end of session, patient returned to bed  with call light and phone within reach, all lines and tubes intact.  *ALARM ON     Overall patient demonstrated  decreased independence and safety during completion of ADL/functional transfer/mobility tasks.  Pt would benefit from continued skilled OT to increase safety and independence with completion of ADL/IADL tasks for functional independence and quality of life.          Rehab Potential: good for established goals     Patient / Family Goal: none stated       Patient and/or family were instructed on functional diagnosis, prognosis/goals and OT plan of care. Demonstrated fair +  understanding.     Eval Complexity: Low    Time In: 0910  Time Out: 0925  T    Min Units   OT Eval Low 97165 x  1   OT Eval Medium 73888      OT Eval High 08411      OT Re-Eval 42180       Therapeutic Ex 90388      Therapeutic Activities 64244       ADL/Self Care 57054      Orthotic Management 45086       Manual 30869     Neuro Re-Ed 96081       Non-Billable Time       OT Re eval 36406        Evaluation Time additionally includes thorough review of current medical information, gathering information on past medical history/social history and prior level of function, interpretation of standardized testing/informal observation of tasks, assessment of data and development of plan of care and goals.            Tiffanie Moreira  OTR/L  OT [id number]                           
Myeloproliferative disorder (HCC)    Type 2 diabetes mellitus with diabetic nephropathy (HCC)    Coronary artery disease involving native coronary artery with angina pectoris, unspecified whether native or transplanted heart    Duodenitis    Thrombocytosis    Controlled type 2 diabetes mellitus without complication    Acidosis    Duodenal ulcer  Resolved Problems:    * No resolved hospital problems. *      Plan:  Gi bleed/melena monitor closely for any bleeding  Partial gastric outlet obstruction monitor closely   Duodenal ulcer s/p egd  ppi and carafate surgery following  Dm type 2 controlled monitor bs and tx with insulin  Elevated plt count monitor   Acidosis monitor  Myelodysplastic disease monitor closely     Continue abx.   Pt with abdominal pain.  Pt had imaging by surgery.     Electronically signed by Bird Vazquez DO on 4/4/2025 at 1:48 PM    
ulcer  Resolved Problems:    * No resolved hospital problems. *      Plan:  Gi bleed/melena monitor closely for any bleeding  Partial gastric outlet obstruction monitor closely   Duodenal ulcer s/p egd  ppi and carafate surgery following  Dm type 2 controlled monitor bs and tx with insulin  Elevated plt count monitor   Acidosis monitor  Myelodysplastic disease monitor closely     Pt with continued urinary retention. Urology consulted.   Continue to encourage activity.  Continue to encourage po.     Electronically signed by Bird Vazquez,  on 4/7/2025 at 6:25 PM

## 2025-04-08 NOTE — PLAN OF CARE
Problem: ABCDS Injury Assessment  Goal: Absence of physical injury  Outcome: Progressing  Flowsheets (Taken 4/7/2025 2019)  Absence of Physical Injury: Implement safety measures based on patient assessment     Problem: Skin/Tissue Integrity  Goal: Skin integrity remains intact  Description: 1.  Monitor for areas of redness and/or skin breakdown  2.  Assess vascular access sites hourly  3.  Every 4-6 hours minimum:  Change oxygen saturation probe site  4.  Every 4-6 hours:  If on nasal continuous positive airway pressure, respiratory therapy assess nares and determine need for appliance change or resting period  Outcome: Progressing  Flowsheets (Taken 4/7/2025 2019)  Skin Integrity Remains Intact: Monitor for areas of redness and/or skin breakdown     Problem: Discharge Planning  Goal: Discharge to home or other facility with appropriate resources  Outcome: Progressing     Problem: Pain  Goal: Verbalizes/displays adequate comfort level or baseline comfort level  Outcome: Progressing     Problem: Chronic Conditions and Co-morbidities  Goal: Patient's chronic conditions and co-morbidity symptoms are monitored and maintained or improved  Outcome: Progressing  Flowsheets (Taken 4/7/2025 2019)  Care Plan - Patient's Chronic Conditions and Co-Morbidity Symptoms are Monitored and Maintained or Improved: Monitor and assess patient's chronic conditions and comorbid symptoms for stability, deterioration, or improvement     Problem: Safety - Adult  Goal: Free from fall injury  Outcome: Progressing     Problem: Nutrition Deficit:  Goal: Optimize nutritional status  Outcome: Progressing

## 2025-04-08 NOTE — DISCHARGE SUMMARY
Refills: 1      folic acid (FOLVITE) 1 MG tablet Take 1 tablet by mouth daily  Qty: 90 tablet, Refills: 1      vitamin D3 (CHOLECALCIFEROL) 25 MCG (1000 UT) TABS tablet Take 1 tablet by mouth every morning      Multiple Vitamins-Minerals (THERAPEUTIC MULTIVITAMIN-MINERALS) tablet Take 1 tablet by mouth every morning      zolpidem (AMBIEN) 5 MG tablet Take 1 tablet by mouth nightly.             Time Spent on discharge is 35mins in the examination, evaluation, counseling and review of medications and discharge plan.      Signed:    Ibrahima Lang MD   4/8/2025

## 2025-04-08 NOTE — CARE COORDINATION
Transition of Care-patient will discharge to [facility] today, [facility] will transport at 1300. Daughter Gemma and [facility] liaison updated.    Latasha BENJAMINN, RN  [facility]

## 2025-04-09 ENCOUNTER — TELEPHONE (OUTPATIENT)
Dept: FAMILY MEDICINE CLINIC | Age: 80
End: 2025-04-09

## 2025-04-09 LAB — SURGICAL PATHOLOGY REPORT: NORMAL

## 2025-04-09 NOTE — TELEPHONE ENCOUNTER
RECEIVED CALL FROM CORINA DENNIS AT [facility] REQUESTING A COPY OF PT IMMUNIZATION HISORY. PRINTED AND FAXED [phone]

## 2025-04-10 ENCOUNTER — CARE COORDINATION (OUTPATIENT)
Dept: CARE COORDINATION | Age: 80
End: 2025-04-10

## 2025-04-23 ENCOUNTER — CARE COORDINATION (OUTPATIENT)
Dept: CARE COORDINATION | Age: 80
End: 2025-04-23

## 2025-04-23 NOTE — CARE COORDINATION
Care Transitions Post-Acute Facility Update Call    2025    Patient: Marilu Marcos Patient : 1945   MRN: <[id number]>  Reason for Admission: Melena, GI bleed, failure to thrive  Discharge Date: 25 RARS: Readmission Risk Score: 16.1    Post Acute Facility Update    Care Transitions Post Acute Facility Update    Care Transitions Interventions      Post Acute Facility Update   Post Acute Facility: [facility]          Nursing   Prescribed diet: regular    Nutrition intake assessment: %   Wounds: Neg   Reported Nursing Updates: VSS, no nursing barriers identified       Rehab/Functional   Prior Level of Functioning: independent   Cognitive function assessment: A&O X3   ADLs: Minimal Assistance   Bed Mobility: Stand by Assist - Presence and Cueing   Transfer Assistance: Stand by Assist - Presence and Cueing   Ambulation Assistance: Contact Guard Assist - Hands on patient for balance   How far (in feet) is the patient ambulating?: 130   Does patient use an assistive device?: Yes   Assistive Devices: FWW   Rehab Notes: Supervision bed/tx, gait 130 ft FWW Sba/CGA       SW/Discharge Planning   Goals of Care: home with    Caregiver support: son   Discharge Planning / Barriers: Limited mobility    Progressing well, SBA with many activities, min for ADL    Will offer home eval for next week with anticipated DC 1-2 weeks   Care Progression on Track?: Pos  Next IDT Planned Review: 25           Next IDT Planned Review:     Future Appointments   Date Time Provider Department Center   2025  9:30 AM Juan Carlos Carter DO [facility] DEP   2025  2:00 PM Juan Carlos Carter DO [facility] DEP       SN Notes:   Diet: - Oral Diet:  General  Wounds: none  Medications: Other: facility  Other:    Post-acute CC Notes:     AMANDA DOCKERY, RN

## 2025-05-08 ENCOUNTER — CARE COORDINATION (OUTPATIENT)
Dept: CARE COORDINATION | Age: 80
End: 2025-05-08

## 2025-05-08 ENCOUNTER — TELEPHONE (OUTPATIENT)
Dept: FAMILY MEDICINE CLINIC | Age: 80
End: 2025-05-08

## 2025-05-08 DIAGNOSIS — D47.1 MYELOPROLIFERATIVE DISORDER (HCC): ICD-10-CM

## 2025-05-08 DIAGNOSIS — G57.93 NEUROPATHIC PAIN OF BOTH LEGS: ICD-10-CM

## 2025-05-08 DIAGNOSIS — M54.42 CHRONIC MIDLINE LOW BACK PAIN WITH LEFT-SIDED SCIATICA: Primary | ICD-10-CM

## 2025-05-08 DIAGNOSIS — K50.119 CROHN'S COLITIS, UNSPECIFIED COMPLICATION (HCC): ICD-10-CM

## 2025-05-08 DIAGNOSIS — G89.29 CHRONIC MIDLINE LOW BACK PAIN WITH LEFT-SIDED SCIATICA: Primary | ICD-10-CM

## 2025-05-08 RX ORDER — HYDROCODONE BITARTRATE AND ACETAMINOPHEN 7.5; 325 MG/1; MG/1
1 TABLET ORAL EVERY 6 HOURS PRN
Qty: 120 TABLET | Refills: 0 | Status: ON HOLD | OUTPATIENT
Start: 2025-05-08 | End: 2025-06-07

## 2025-05-08 NOTE — CARE COORDINATION
Care Transitions Note    Initial Call - Call within 2 business days of discharge: Yes    Patient Current Location:  Home: [address] [place] [id number]    Post Acute Care Manager contacted the patient by telephone to perform post hospital discharge assessment, verified name and  as identifiers.  Provided introduction to self, and explanation of the Post Acute Care Manager role.    Patient: Marilu Marcos    Patient : 1945   MRN: <[id number]>    Reason for Admission: Melena, GI bleed, failure to thrive   Discharge Date: 25  RURS: Readmission Risk Score: 16.1      Last Discharge Facility       Date Complaint Diagnosis Description Type Department Provider    25 GI Bleeding Melena ... ED to Hosp-Admission (Discharged) (ADMITTED) Ibrahima Ruiz MD; Petra Szymanski...            Was this an external facility discharge? Yes. Discharge Date: . Facility Name: [facility]    Additional needs identified to be addressed with provider   N/a             Method of communication with provider:  n/a .    Patients top risk factors for readmission:  readmitted before assessment completed    Interventions to address risk factors:   N/a    Care Summary Note: brief call with call planned next day, readmitted before assessment completed    Advance Care Planning:   Does patient have an Advance Directive: deferred at this time, will discuss on future follow up. .             requested call back next day to complete RELL  AMANDA DOCKERY, RN

## 2025-05-08 NOTE — TELEPHONE ENCOUNTER
Patient's daughter called stating that patient has been release from nursing home and is in need of pain medication. Daughter states that patient voiced that she would rather have Norco over the Oxy, which she has been getting. I did explain that since she wanted something different, I would have to send you an encounter stating this.    Thank you.

## 2025-05-09 ENCOUNTER — HOSPITAL ENCOUNTER (INPATIENT)
Age: 80
LOS: 4 days | Discharge: NURSING, REHAB OR LONG-TERM CARE | DRG: 563 | End: 2025-05-13
Attending: EMERGENCY MEDICINE | Admitting: INTERNAL MEDICINE
Payer: MEDICARE

## 2025-05-09 ENCOUNTER — APPOINTMENT (OUTPATIENT)
Dept: GENERAL RADIOLOGY | Age: 80
DRG: 563 | End: 2025-05-09
Payer: MEDICARE

## 2025-05-09 ENCOUNTER — APPOINTMENT (OUTPATIENT)
Dept: CT IMAGING | Age: 80
DRG: 563 | End: 2025-05-09
Payer: MEDICARE

## 2025-05-09 DIAGNOSIS — M54.42 CHRONIC MIDLINE LOW BACK PAIN WITH LEFT-SIDED SCIATICA: ICD-10-CM

## 2025-05-09 DIAGNOSIS — G57.93 NEUROPATHIC PAIN OF BOTH LEGS: ICD-10-CM

## 2025-05-09 DIAGNOSIS — K50.119 CROHN'S COLITIS, UNSPECIFIED COMPLICATION (HCC): ICD-10-CM

## 2025-05-09 DIAGNOSIS — R55 SYNCOPE AND COLLAPSE: ICD-10-CM

## 2025-05-09 DIAGNOSIS — G89.29 CHRONIC MIDLINE LOW BACK PAIN WITH LEFT-SIDED SCIATICA: ICD-10-CM

## 2025-05-09 DIAGNOSIS — D47.1 MYELOPROLIFERATIVE DISORDER (HCC): ICD-10-CM

## 2025-05-09 DIAGNOSIS — S42.202A CLOSED FRACTURE OF PROXIMAL END OF LEFT HUMERUS, UNSPECIFIED FRACTURE MORPHOLOGY, INITIAL ENCOUNTER: ICD-10-CM

## 2025-05-09 DIAGNOSIS — R55 SYNCOPE, UNSPECIFIED SYNCOPE TYPE: Primary | ICD-10-CM

## 2025-05-09 DIAGNOSIS — S72.142A CLOSED INTERTROCHANTERIC FRACTURE, LEFT, INITIAL ENCOUNTER (HCC): ICD-10-CM

## 2025-05-09 PROBLEM — E87.1 HYPONATREMIA: Status: ACTIVE | Noted: 2025-05-09

## 2025-05-09 PROBLEM — S42.92XA SHOULDER FRACTURE, LEFT, CLOSED, INITIAL ENCOUNTER: Status: ACTIVE | Noted: 2025-05-09

## 2025-05-09 LAB
ALBUMIN SERPL-MCNC: 3.5 G/DL (ref 3.5–5.2)
ALP SERPL-CCNC: 85 U/L (ref 35–104)
ALT SERPL-CCNC: 17 U/L (ref 0–32)
ANION GAP SERPL CALCULATED.3IONS-SCNC: 13 MMOL/L (ref 7–16)
AST SERPL-CCNC: 36 U/L (ref 0–31)
BASOPHILS # BLD: 0.03 K/UL (ref 0–0.2)
BASOPHILS NFR BLD: 0 % (ref 0–2)
BILIRUB SERPL-MCNC: 0.9 MG/DL (ref 0–1.2)
BUN SERPL-MCNC: 14 MG/DL (ref 6–23)
CALCIUM SERPL-MCNC: 7.8 MG/DL (ref 8.6–10.2)
CHLORIDE SERPL-SCNC: 91 MMOL/L (ref 98–107)
CK SERPL-CCNC: 379 U/L (ref 20–180)
CO2 SERPL-SCNC: 22 MMOL/L (ref 22–29)
CREAT SERPL-MCNC: 0.5 MG/DL (ref 0.5–1)
EOSINOPHIL # BLD: 0 K/UL (ref 0.05–0.5)
EOSINOPHILS RELATIVE PERCENT: 0 % (ref 0–6)
ERYTHROCYTE [DISTWIDTH] IN BLOOD BY AUTOMATED COUNT: 14.7 % (ref 11.5–15)
GFR, ESTIMATED: >90 ML/MIN/1.73M2
GLUCOSE BLD-MCNC: 119 MG/DL (ref 74–99)
GLUCOSE BLD-MCNC: 127 MG/DL (ref 74–99)
GLUCOSE SERPL-MCNC: 153 MG/DL (ref 74–99)
HCT VFR BLD AUTO: 32.1 % (ref 34–48)
HGB BLD-MCNC: 10.3 G/DL (ref 11.5–15.5)
IMM GRANULOCYTES # BLD AUTO: 0.09 K/UL (ref 0–0.58)
IMM GRANULOCYTES NFR BLD: 1 % (ref 0–5)
LYMPHOCYTES NFR BLD: 0.49 K/UL (ref 1.5–4)
LYMPHOCYTES RELATIVE PERCENT: 6 % (ref 20–42)
MCH RBC QN AUTO: 37.2 PG (ref 26–35)
MCHC RBC AUTO-ENTMCNC: 32.1 G/DL (ref 32–34.5)
MCV RBC AUTO: 115.9 FL (ref 80–99.9)
MONOCYTES NFR BLD: 0.62 K/UL (ref 0.1–0.95)
MONOCYTES NFR BLD: 7 % (ref 2–12)
NEUTROPHILS NFR BLD: 85 % (ref 43–80)
NEUTS SEG NFR BLD: 7.14 K/UL (ref 1.8–7.3)
PLATELET # BLD AUTO: 382 K/UL (ref 130–450)
PMV BLD AUTO: 9.7 FL (ref 7–12)
POTASSIUM SERPL-SCNC: 3.5 MMOL/L (ref 3.5–5)
PROT SERPL-MCNC: 5.8 G/DL (ref 6.4–8.3)
RBC # BLD AUTO: 2.77 M/UL (ref 3.5–5.5)
RBC # BLD: ABNORMAL 10*6/UL
SODIUM SERPL-SCNC: 126 MMOL/L (ref 132–146)
WBC OTHER # BLD: 8.4 K/UL (ref 4.5–11.5)

## 2025-05-09 PROCEDURE — 85025 COMPLETE CBC W/AUTO DIFF WBC: CPT

## 2025-05-09 PROCEDURE — 82550 ASSAY OF CK (CPK): CPT

## 2025-05-09 PROCEDURE — 99285 EMERGENCY DEPT VISIT HI MDM: CPT

## 2025-05-09 PROCEDURE — 96374 THER/PROPH/DIAG INJ IV PUSH: CPT

## 2025-05-09 PROCEDURE — 2580000003 HC RX 258: Performed by: INTERNAL MEDICINE

## 2025-05-09 PROCEDURE — 73030 X-RAY EXAM OF SHOULDER: CPT

## 2025-05-09 PROCEDURE — 72170 X-RAY EXAM OF PELVIS: CPT

## 2025-05-09 PROCEDURE — 80053 COMPREHEN METABOLIC PANEL: CPT

## 2025-05-09 PROCEDURE — 70450 CT HEAD/BRAIN W/O DYE: CPT

## 2025-05-09 PROCEDURE — 6370000000 HC RX 637 (ALT 250 FOR IP): Performed by: INTERNAL MEDICINE

## 2025-05-09 PROCEDURE — 82962 GLUCOSE BLOOD TEST: CPT

## 2025-05-09 PROCEDURE — 1200000000 HC SEMI PRIVATE

## 2025-05-09 PROCEDURE — 99223 1ST HOSP IP/OBS HIGH 75: CPT | Performed by: INTERNAL MEDICINE

## 2025-05-09 PROCEDURE — 72125 CT NECK SPINE W/O DYE: CPT

## 2025-05-09 PROCEDURE — 71250 CT THORAX DX C-: CPT

## 2025-05-09 PROCEDURE — 6360000002 HC RX W HCPCS: Performed by: EMERGENCY MEDICINE

## 2025-05-09 RX ORDER — ATORVASTATIN CALCIUM 40 MG/1
40 TABLET, FILM COATED ORAL DAILY
Status: DISCONTINUED | OUTPATIENT
Start: 2025-05-09 | End: 2025-05-14 | Stop reason: HOSPADM

## 2025-05-09 RX ORDER — SODIUM CHLORIDE 9 MG/ML
INJECTION, SOLUTION INTRAVENOUS CONTINUOUS
Status: DISCONTINUED | OUTPATIENT
Start: 2025-05-09 | End: 2025-05-10

## 2025-05-09 RX ORDER — FOLIC ACID 1 MG/1
1 TABLET ORAL DAILY
Status: DISCONTINUED | OUTPATIENT
Start: 2025-05-09 | End: 2025-05-14 | Stop reason: HOSPADM

## 2025-05-09 RX ORDER — OMEPRAZOLE 20 MG/1
40 CAPSULE, DELAYED RELEASE ORAL DAILY
COMMUNITY

## 2025-05-09 RX ORDER — DEXTROSE MONOHYDRATE 100 MG/ML
INJECTION, SOLUTION INTRAVENOUS CONTINUOUS PRN
Status: DISCONTINUED | OUTPATIENT
Start: 2025-05-09 | End: 2025-05-14 | Stop reason: HOSPADM

## 2025-05-09 RX ORDER — HYDROCODONE BITARTRATE AND ACETAMINOPHEN 5; 325 MG/1; MG/1
1 TABLET ORAL EVERY 6 HOURS PRN
Status: DISCONTINUED | OUTPATIENT
Start: 2025-05-09 | End: 2025-05-11

## 2025-05-09 RX ORDER — FENTANYL CITRATE 50 UG/ML
25 INJECTION, SOLUTION INTRAMUSCULAR; INTRAVENOUS ONCE
Status: COMPLETED | OUTPATIENT
Start: 2025-05-09 | End: 2025-05-09

## 2025-05-09 RX ORDER — ASPIRIN 81 MG/1
81 TABLET ORAL DAILY
Status: DISCONTINUED | OUTPATIENT
Start: 2025-05-09 | End: 2025-05-14 | Stop reason: HOSPADM

## 2025-05-09 RX ORDER — ONDANSETRON 4 MG/1
4 TABLET, ORALLY DISINTEGRATING ORAL EVERY 8 HOURS PRN
Status: DISCONTINUED | OUTPATIENT
Start: 2025-05-09 | End: 2025-05-14 | Stop reason: HOSPADM

## 2025-05-09 RX ORDER — FOLIC ACID 1 MG/1
1 TABLET ORAL DAILY
COMMUNITY

## 2025-05-09 RX ORDER — SODIUM CHLORIDE 0.9 % (FLUSH) 0.9 %
5-40 SYRINGE (ML) INJECTION PRN
Status: DISCONTINUED | OUTPATIENT
Start: 2025-05-09 | End: 2025-05-14 | Stop reason: HOSPADM

## 2025-05-09 RX ORDER — PANTOPRAZOLE SODIUM 40 MG/1
40 TABLET, DELAYED RELEASE ORAL
Status: DISCONTINUED | OUTPATIENT
Start: 2025-05-10 | End: 2025-05-14 | Stop reason: HOSPADM

## 2025-05-09 RX ORDER — SODIUM CHLORIDE 9 MG/ML
INJECTION, SOLUTION INTRAVENOUS PRN
Status: DISCONTINUED | OUTPATIENT
Start: 2025-05-09 | End: 2025-05-14 | Stop reason: HOSPADM

## 2025-05-09 RX ORDER — DICYCLOMINE HYDROCHLORIDE 10 MG/1
20 CAPSULE ORAL 3 TIMES DAILY PRN
Status: DISCONTINUED | OUTPATIENT
Start: 2025-05-09 | End: 2025-05-14 | Stop reason: HOSPADM

## 2025-05-09 RX ORDER — METOPROLOL TARTRATE 50 MG
50 TABLET ORAL DAILY
COMMUNITY

## 2025-05-09 RX ORDER — LORAZEPAM 0.5 MG/1
0.5 TABLET ORAL EVERY 6 HOURS PRN
Status: DISCONTINUED | OUTPATIENT
Start: 2025-05-09 | End: 2025-05-14 | Stop reason: HOSPADM

## 2025-05-09 RX ORDER — DICYCLOMINE HCL 20 MG
20 TABLET ORAL EVERY 6 HOURS
COMMUNITY

## 2025-05-09 RX ORDER — POLYETHYLENE GLYCOL 3350 17 G/17G
17 POWDER, FOR SOLUTION ORAL DAILY PRN
Status: DISCONTINUED | OUTPATIENT
Start: 2025-05-09 | End: 2025-05-14 | Stop reason: HOSPADM

## 2025-05-09 RX ORDER — INSULIN LISPRO 100 [IU]/ML
0-4 INJECTION, SOLUTION INTRAVENOUS; SUBCUTANEOUS
Status: DISCONTINUED | OUTPATIENT
Start: 2025-05-09 | End: 2025-05-14 | Stop reason: HOSPADM

## 2025-05-09 RX ORDER — MAGNESIUM SULFATE IN WATER 40 MG/ML
2000 INJECTION, SOLUTION INTRAVENOUS PRN
Status: DISCONTINUED | OUTPATIENT
Start: 2025-05-09 | End: 2025-05-14 | Stop reason: HOSPADM

## 2025-05-09 RX ORDER — FUROSEMIDE 20 MG/1
20 TABLET ORAL DAILY
COMMUNITY

## 2025-05-09 RX ORDER — HYDROXYUREA 500 MG/1
500 CAPSULE ORAL EVERY MORNING
Status: DISCONTINUED | OUTPATIENT
Start: 2025-05-10 | End: 2025-05-14 | Stop reason: HOSPADM

## 2025-05-09 RX ORDER — POTASSIUM CHLORIDE 1500 MG/1
40 TABLET, EXTENDED RELEASE ORAL PRN
Status: DISCONTINUED | OUTPATIENT
Start: 2025-05-09 | End: 2025-05-14 | Stop reason: HOSPADM

## 2025-05-09 RX ORDER — ACETAMINOPHEN 650 MG/1
650 SUPPOSITORY RECTAL EVERY 6 HOURS PRN
Status: DISCONTINUED | OUTPATIENT
Start: 2025-05-09 | End: 2025-05-14 | Stop reason: HOSPADM

## 2025-05-09 RX ORDER — LORAZEPAM 0.5 MG/1
0.5 TABLET ORAL EVERY 6 HOURS PRN
COMMUNITY

## 2025-05-09 RX ORDER — POTASSIUM CHLORIDE 7.45 MG/ML
10 INJECTION INTRAVENOUS PRN
Status: DISCONTINUED | OUTPATIENT
Start: 2025-05-09 | End: 2025-05-14 | Stop reason: HOSPADM

## 2025-05-09 RX ORDER — FENTANYL CITRATE 50 UG/ML
50 INJECTION, SOLUTION INTRAMUSCULAR; INTRAVENOUS ONCE
Status: DISCONTINUED | OUTPATIENT
Start: 2025-05-09 | End: 2025-05-09

## 2025-05-09 RX ORDER — METOPROLOL TARTRATE 50 MG
50 TABLET ORAL DAILY
Status: DISCONTINUED | OUTPATIENT
Start: 2025-05-09 | End: 2025-05-14 | Stop reason: HOSPADM

## 2025-05-09 RX ORDER — GLUCAGON 1 MG/ML
1 KIT INJECTION PRN
Status: DISCONTINUED | OUTPATIENT
Start: 2025-05-09 | End: 2025-05-14 | Stop reason: HOSPADM

## 2025-05-09 RX ORDER — ONDANSETRON 2 MG/ML
4 INJECTION INTRAMUSCULAR; INTRAVENOUS EVERY 6 HOURS PRN
Status: DISCONTINUED | OUTPATIENT
Start: 2025-05-09 | End: 2025-05-14 | Stop reason: HOSPADM

## 2025-05-09 RX ORDER — SODIUM CHLORIDE 0.9 % (FLUSH) 0.9 %
5-40 SYRINGE (ML) INJECTION EVERY 12 HOURS SCHEDULED
Status: DISCONTINUED | OUTPATIENT
Start: 2025-05-09 | End: 2025-05-14 | Stop reason: HOSPADM

## 2025-05-09 RX ORDER — ACETAMINOPHEN 325 MG/1
650 TABLET ORAL EVERY 6 HOURS PRN
Status: DISCONTINUED | OUTPATIENT
Start: 2025-05-09 | End: 2025-05-14 | Stop reason: HOSPADM

## 2025-05-09 RX ADMIN — ATORVASTATIN CALCIUM 40 MG: 40 TABLET, FILM COATED ORAL at 16:56

## 2025-05-09 RX ADMIN — SODIUM CHLORIDE: 0.9 INJECTION, SOLUTION INTRAVENOUS at 16:11

## 2025-05-09 RX ADMIN — FOLIC ACID 1 MG: 1 TABLET ORAL at 16:56

## 2025-05-09 RX ADMIN — HYDROCODONE BITARTRATE AND ACETAMINOPHEN 1 TABLET: 5; 325 TABLET ORAL at 20:36

## 2025-05-09 RX ADMIN — METOPROLOL TARTRATE 50 MG: 50 TABLET, FILM COATED ORAL at 16:56

## 2025-05-09 RX ADMIN — FENTANYL CITRATE 25 MCG: 50 INJECTION INTRAMUSCULAR; INTRAVENOUS at 12:54

## 2025-05-09 RX ADMIN — ASPIRIN 81 MG: 81 TABLET, COATED ORAL at 16:56

## 2025-05-09 RX ADMIN — LORAZEPAM 0.5 MG: 0.5 TABLET ORAL at 16:56

## 2025-05-09 ASSESSMENT — PAIN DESCRIPTION - LOCATION
LOCATION: SHOULDER;CHEST
LOCATION: ARM;SHOULDER

## 2025-05-09 ASSESSMENT — PAIN SCALES - GENERAL
PAINLEVEL_OUTOF10: 7
PAINLEVEL_OUTOF10: 2
PAINLEVEL_OUTOF10: 7
PAINLEVEL_OUTOF10: 7
PAINLEVEL_OUTOF10: 8

## 2025-05-09 ASSESSMENT — PAIN DESCRIPTION - DESCRIPTORS
DESCRIPTORS: ACHING;SHARP
DESCRIPTORS: ACHING;DISCOMFORT;SORE

## 2025-05-09 ASSESSMENT — PAIN DESCRIPTION - ORIENTATION
ORIENTATION: RIGHT
ORIENTATION: LEFT;RIGHT

## 2025-05-09 ASSESSMENT — PAIN - FUNCTIONAL ASSESSMENT
PAIN_FUNCTIONAL_ASSESSMENT: PREVENTS OR INTERFERES SOME ACTIVE ACTIVITIES AND ADLS
PAIN_FUNCTIONAL_ASSESSMENT: PREVENTS OR INTERFERES SOME ACTIVE ACTIVITIES AND ADLS
PAIN_FUNCTIONAL_ASSESSMENT: 0-10

## 2025-05-09 ASSESSMENT — PAIN DESCRIPTION - FREQUENCY: FREQUENCY: CONTINUOUS

## 2025-05-09 NOTE — PROGRESS NOTES
Database initiated pharmacy and medications verified with the patient. She is A&O comes in from home alone. She uses a cane and walker and is RA at baseline. She is here for a fall. She has Visiting [facility].

## 2025-05-09 NOTE — ED NOTES
79 y/o female to the ed with a c/c of \"multiple falls\" overnight. Patient noted with mechanical falls in her home, striking her chest, arms and shoulders. Patient noted with dark bruising to her rught upper chest, and potential deformities to bilateral shoulders and left elbow.  Upon arrival, patient noted PW&D and presenting in NAD. Patient denies chest pain, sob or difficulty breathing. Patient denies ABD pain, n/v/d or fever. Patient noted with + pms x 4, pupils PERRLA @ 3 and lung sounds that are clear and equil bilaterally. Patient placed on telemetry, BP and pulse ox. 12-lead EKG performed. Vitals noted as recorded. PIV placed with labs drawn and sent. Call light placed within patient's reach, and bed in lowest position with side rails up x 2 for safety. Provider at the bedside for assessment.

## 2025-05-09 NOTE — ED PROVIDER NOTES
80-year-old female presents to the emergency department after a mechanical fall at home patient has a history of falls patient noticeably has a lot of swelling and pain in her left humerus.  Patient unable to ambulate reports no nausea vomiting diarrhea abdominal pain or other complaints unsure if patient had a possible syncopal episode to go with it.  Patient states no fevers or chills cough congestion or other complaints    The history is provided by the patient.   Fall  The accident occurred Less than 1 hour ago. The fall occurred while walking. She fell from a height of 1 to 2 ft. The point of impact was the left shoulder. The pain is present in the left shoulder. Pertinent negatives include no fever, no nausea, no vomiting and no headaches.        Review of Systems   Constitutional:  Negative for chills and fever.   HENT:  Negative for ear pain, sinus pressure and sore throat.    Eyes:  Negative for pain, discharge and redness.   Respiratory:  Negative for cough, shortness of breath and wheezing.    Cardiovascular:  Negative for chest pain.   Gastrointestinal:  Negative for abdominal distention, diarrhea, nausea and vomiting.   Genitourinary:  Negative for dysuria and frequency.   Musculoskeletal:  Negative for arthralgias and back pain.   Skin:  Negative for rash and wound.   Neurological:  Negative for weakness and headaches.   Hematological:  Negative for adenopathy.   All other systems reviewed and are negative.       Physical Exam  Constitutional:       Appearance: Normal appearance.   HENT:      Head: Normocephalic and atraumatic.      Nose: Nose normal.      Mouth/Throat:      Mouth: Mucous membranes are moist.   Eyes:      Pupils: Pupils are equal, round, and reactive to light.   Cardiovascular:      Rate and Rhythm: Normal rate and regular rhythm.      Pulses: Normal pulses.      Heart sounds: Normal heart sounds.   Pulmonary:      Effort: Pulmonary effort is normal.      Breath sounds: Normal

## 2025-05-09 NOTE — H&P
[facility] Hospitalist Group History and Physical    Perpetual assessment: 80-year-old female with past medical history of Crohn's disease, thrombocytosis, diabetes and coronary artery disease presents with a fall.    Assessment and plan:    Problem shoulder dislocation and possible  Syncope and collapse  - Patient may have syncopal episode but she is not quite clear  - Seems she had some confusion but does take the Ambien at night  - Examination does reveal a systolic murmur  - No echo on file to see  - Will check orthostatics  - Check echo for aortic stenosis  - Will have PT OT evaluate as may require more services at home    Hyponatremia  - Mild in nature  - Will provide hydration  - Repeat chemistry in a.m.    Crohn's disease without complication  - Stable at the moment  - Bentyl as needed    Thrombocytosis  - Per history  - Platelet count stable  - Continue hydroxyurea    Type II NIDDM without complication  - Seems to be diet controlled the moment  - Not on any medications  - Sliding scale for now    Coronary artery disease of native vessel of native heart without angina  - Remote in nature  - Reports did have a stent  - Continue aspirin, Lopressor and statin therapy    Code Status: Full  DVT prophylaxis: SCDs      CHIEF COMPLAINT: Fall    History of Present Illness: This is a 80-year-old female who presents to [facility] with the above-stated complaint.  All history has been obtained from the patient and review of medical records.  Patient does have a known history of falls along with history of coronary disease and coronary artery disease.  Apparently last night she had had a dream about cleaning the vanity.  Subsequent she got up to clean the vanity and lost her footing and fell.  She states that she felt little lightheaded and unsteady on her feet.  Subsequently she was brought into the ED after family found on the floor.  Imaging reportedly showed a fracture of her left shoulder.

## 2025-05-09 NOTE — ED NOTES
ED TO INPATIENT SBAR HANDOFF    Patient Name: Marilu Marcos   :  1945  80 y.o.   Preferred Name : As recorded  Family/Caregiver Present no   Restraints no   C-SSRS: Risk of Suicide: No Risk  Sitter no   Sepsis Risk Score Sepsis V2 Risk Score: 20.9      Situation  No chief complaint on file.    Brief Description of Patient's Condition:   79 y/o female to the ed with a c/c of \"multiple falls\" overnight. Patient noted with mechanical falls in her home, striking her chest, arms and shoulders. Patient noted with dark bruising to her rught upper chest, and potential deformities to bilateral shoulders and left elbow.  Upon arrival, patient noted PW&D and presenting in NAD. Patient denies chest pain, sob or difficulty breathing. Patient denies ABD pain, n/v/d or fever. Patient noted with + pms x 4, pupils PERRLA @ 3 and lung sounds that are clear and equil bilaterally. Patient placed on telemetry, BP and pulse ox. 12-lead EKG performed. Vitals noted as recorded. PIV placed with labs drawn and sent. Call light placed within patient's reach, and bed in lowest position with side rails up x 2 for safety. Provider at the bedside for assessment.   Mental Status: oriented  Arrived from: home    Imaging:   CT HEAD WO CONTRAST    (Results Pending)   CT CERVICAL SPINE WO CONTRAST    (Results Pending)   CT CHEST WO CONTRAST    (Results Pending)   XR SHOULDER LEFT (MIN 2 VIEWS)    (Results Pending)   XR PELVIS (1-2 VIEWS)    (Results Pending)     Abnormal labs:   Abnormal Labs Reviewed   CBC WITH AUTO DIFFERENTIAL - Abnormal; Notable for the following components:       Result Value    RBC 2.77 (*)     Hemoglobin 10.3 (*)     Hematocrit 32.1 (*)     .9 (*)     MCH 37.2 (*)     Neutrophils % 85 (*)     Lymphocytes % 6 (*)     Lymphocytes Absolute 0.49 (*)     Eosinophils Absolute 0.00 (*)     All other components within normal limits   COMPREHENSIVE METABOLIC PANEL - Abnormal; Notable for the following components:

## 2025-05-09 NOTE — PROGRESS NOTES
4 Eyes Skin Assessment     NAME:  Marilu Marcos  YOB: 1945  MEDICAL RECORD NUMBER:  [id number]    The patient is being assessed for  Admission    I agree that at least one RN has performed a thorough Head to Toe Skin Assessment on the patient. ALL assessment sites listed below have been assessed.      Areas assessed by both nurses:    Sacrum. Buttock, Coccyx, Ischium        Does the Patient have a Wound? No noted wound(s)       [place] Prevention initiated by RN: Yes  Wound Care Orders initiated by RN: Yes    Pressure Injury (Stage 3,4, Unstageable, DTI, NWPT, and Complex wounds) if present, place Wound referral order by RN under : No    New Ostomies, if present place, Ostomy referral order under : Yes     Nurse 1 eSignature: Electronically signed by Malka Zhang RN on 5/9/25 at 6:38 PM EDT    **SHARE this note so that the co-signing nurse can place an eSignature**    Nurse 2 eSignature: Electronically signed by Yung Pulliam RN on 5/9/25 at 7:06 PM EDT

## 2025-05-10 PROBLEM — R55 SYNCOPE: Status: ACTIVE | Noted: 2025-05-10

## 2025-05-10 LAB
ANION GAP SERPL CALCULATED.3IONS-SCNC: 12 MMOL/L (ref 7–16)
BILIRUB UR QL STRIP: NEGATIVE
BUN SERPL-MCNC: 17 MG/DL (ref 6–23)
CALCIUM SERPL-MCNC: 8.7 MG/DL (ref 8.6–10.2)
CHLORIDE SERPL-SCNC: 102 MMOL/L (ref 98–107)
CLARITY UR: ABNORMAL
CO2 SERPL-SCNC: 25 MMOL/L (ref 22–29)
COLOR UR: YELLOW
CREAT SERPL-MCNC: 0.7 MG/DL (ref 0.5–1)
EPI CELLS #/AREA URNS HPF: ABNORMAL /HPF
GFR, ESTIMATED: 88 ML/MIN/1.73M2
GLUCOSE BLD-MCNC: 112 MG/DL (ref 74–99)
GLUCOSE BLD-MCNC: 147 MG/DL (ref 74–99)
GLUCOSE BLD-MCNC: 147 MG/DL (ref 74–99)
GLUCOSE BLD-MCNC: 166 MG/DL (ref 74–99)
GLUCOSE SERPL-MCNC: 116 MG/DL (ref 74–99)
GLUCOSE UR STRIP-MCNC: NEGATIVE MG/DL
HBA1C MFR BLD: 4.8 % (ref 4–5.6)
HGB UR QL STRIP.AUTO: ABNORMAL
KETONES UR STRIP-MCNC: ABNORMAL MG/DL
LEUKOCYTE ESTERASE UR QL STRIP: ABNORMAL
NITRITE UR QL STRIP: NEGATIVE
PH UR STRIP: 6 [PH] (ref 5–8)
POTASSIUM SERPL-SCNC: 3.6 MMOL/L (ref 3.5–5)
PROT UR STRIP-MCNC: NEGATIVE MG/DL
RBC #/AREA URNS HPF: ABNORMAL /HPF
SODIUM SERPL-SCNC: 139 MMOL/L (ref 132–146)
SP GR UR STRIP: 1.02 (ref 1–1.03)
UROBILINOGEN UR STRIP-ACNC: 0.2 EU/DL (ref 0–1)
WBC #/AREA URNS HPF: ABNORMAL /HPF
YEAST URNS QL MICRO: PRESENT

## 2025-05-10 PROCEDURE — 2580000003 HC RX 258: Performed by: STUDENT IN AN ORGANIZED HEALTH CARE EDUCATION/TRAINING PROGRAM

## 2025-05-10 PROCEDURE — 80048 BASIC METABOLIC PNL TOTAL CA: CPT

## 2025-05-10 PROCEDURE — 6370000000 HC RX 637 (ALT 250 FOR IP): Performed by: INTERNAL MEDICINE

## 2025-05-10 PROCEDURE — 2500000003 HC RX 250 WO HCPCS: Performed by: INTERNAL MEDICINE

## 2025-05-10 PROCEDURE — 99233 SBSQ HOSP IP/OBS HIGH 50: CPT | Performed by: STUDENT IN AN ORGANIZED HEALTH CARE EDUCATION/TRAINING PROGRAM

## 2025-05-10 PROCEDURE — 81001 URINALYSIS AUTO W/SCOPE: CPT

## 2025-05-10 PROCEDURE — 1200000000 HC SEMI PRIVATE

## 2025-05-10 PROCEDURE — 83036 HEMOGLOBIN GLYCOSYLATED A1C: CPT

## 2025-05-10 PROCEDURE — 99222 1ST HOSP IP/OBS MODERATE 55: CPT | Performed by: STUDENT IN AN ORGANIZED HEALTH CARE EDUCATION/TRAINING PROGRAM

## 2025-05-10 PROCEDURE — 82962 GLUCOSE BLOOD TEST: CPT

## 2025-05-10 RX ORDER — DEXTROSE MONOHYDRATE AND SODIUM CHLORIDE 5; .45 G/100ML; G/100ML
INJECTION, SOLUTION INTRAVENOUS CONTINUOUS
Status: DISCONTINUED | OUTPATIENT
Start: 2025-05-10 | End: 2025-05-12

## 2025-05-10 RX ADMIN — HYDROCODONE BITARTRATE AND ACETAMINOPHEN 1 TABLET: 5; 325 TABLET ORAL at 17:11

## 2025-05-10 RX ADMIN — HYDROXYUREA 500 MG: 500 CAPSULE ORAL at 08:08

## 2025-05-10 RX ADMIN — ACETAMINOPHEN 650 MG: 325 TABLET ORAL at 06:50

## 2025-05-10 RX ADMIN — SODIUM CHLORIDE, PRESERVATIVE FREE 10 ML: 5 INJECTION INTRAVENOUS at 22:00

## 2025-05-10 RX ADMIN — PANTOPRAZOLE SODIUM 40 MG: 40 TABLET, DELAYED RELEASE ORAL at 06:50

## 2025-05-10 RX ADMIN — SODIUM CHLORIDE, PRESERVATIVE FREE 10 ML: 5 INJECTION INTRAVENOUS at 08:15

## 2025-05-10 RX ADMIN — LORAZEPAM 0.5 MG: 0.5 TABLET ORAL at 08:14

## 2025-05-10 RX ADMIN — LORAZEPAM 0.5 MG: 0.5 TABLET ORAL at 22:03

## 2025-05-10 RX ADMIN — HYDROCODONE BITARTRATE AND ACETAMINOPHEN 1 TABLET: 5; 325 TABLET ORAL at 10:37

## 2025-05-10 RX ADMIN — ATORVASTATIN CALCIUM 40 MG: 40 TABLET, FILM COATED ORAL at 08:08

## 2025-05-10 RX ADMIN — ASPIRIN 81 MG: 81 TABLET, COATED ORAL at 08:08

## 2025-05-10 RX ADMIN — DEXTROSE AND SODIUM CHLORIDE: 5; .45 INJECTION, SOLUTION INTRAVENOUS at 08:36

## 2025-05-10 RX ADMIN — FOLIC ACID 1 MG: 1 TABLET ORAL at 08:08

## 2025-05-10 RX ADMIN — METOPROLOL TARTRATE 50 MG: 50 TABLET, FILM COATED ORAL at 08:08

## 2025-05-10 RX ADMIN — HYDROCODONE BITARTRATE AND ACETAMINOPHEN 1 TABLET: 5; 325 TABLET ORAL at 03:54

## 2025-05-10 ASSESSMENT — PAIN SCALES - GENERAL
PAINLEVEL_OUTOF10: 5
PAINLEVEL_OUTOF10: 7
PAINLEVEL_OUTOF10: 8
PAINLEVEL_OUTOF10: 7
PAINLEVEL_OUTOF10: 8
PAINLEVEL_OUTOF10: 5
PAINLEVEL_OUTOF10: 2
PAINLEVEL_OUTOF10: 3

## 2025-05-10 ASSESSMENT — PAIN - FUNCTIONAL ASSESSMENT
PAIN_FUNCTIONAL_ASSESSMENT: ACTIVITIES ARE NOT PREVENTED
PAIN_FUNCTIONAL_ASSESSMENT: PREVENTS OR INTERFERES SOME ACTIVE ACTIVITIES AND ADLS

## 2025-05-10 ASSESSMENT — PAIN DESCRIPTION - ORIENTATION
ORIENTATION: LEFT
ORIENTATION: LEFT;RIGHT
ORIENTATION: LEFT

## 2025-05-10 ASSESSMENT — PAIN DESCRIPTION - LOCATION
LOCATION: SHOULDER
LOCATION: HEAD
LOCATION: SHOULDER
LOCATION: ARM

## 2025-05-10 ASSESSMENT — PAIN DESCRIPTION - DESCRIPTORS
DESCRIPTORS: SHARP;SHOOTING;SORE
DESCRIPTORS: ACHING;SORE
DESCRIPTORS: ACHING
DESCRIPTORS: SHARP;SHOOTING;SORE;TENDER

## 2025-05-10 NOTE — CONSULTS
DEPARTMENT OF ORTHOPEDIC SURGERY  CONSULT NOTE  [facility]           CHIEF COMPLAINT: Left shoulder pain    HISTORY OF PRESENT ILLNESS:                The patient is a 80 y.o. female who presents with left shoulder pain after a fall.  Patient is right-hand dominant.  She was recently at a rehab facility but just brought home.  She fell after being home 2 days ago.  Denies other orthopedic complaints at this point in time.  Denies any loss of consciousness.    Past Medical History:        Diagnosis Date    Arthritis     Crohn disease (HCC)     Diabetes mellitus (HCC)     Hyperlipidemia     Hypertension     Insomnia     Myelodysplastic disease (HCC)     Ulcerative colitis with intestinal obstruction (HCC)     Unspecified diseases of blood and blood-forming organs      Past Surgical History:        Procedure Laterality Date    ADENOIDECTOMY      CARPAL TUNNEL RELEASE Right     CERVICAL FUSION      COLON SURGERY      COLOSTOMY      EYE SURGERY Bilateral     CATARACT    HERNIA REPAIR      HIP FRACTURE SURGERY Left 9/3/2022    HIP OPEN REDUCTION INTERNAL FIXATION WITH CEPHALOMEDULLARY NAIL performed by Juan Carlos Altamirano MD at [facility] OR    JOINT REPLACEMENT      rt and lt    JOINT REPLACEMENT Bilateral     JOINT REPLACEMENT Right     TONSILLECTOMY      TUBAL LIGATION      UPPER GASTROINTESTINAL ENDOSCOPY N/A 4/3/2025    ESOPHAGOGASTRODUODENOSCOPY BIOPSY performed by Rosalio Mcadams MD at [facility] ENDOSCOPY     Current Medications:   Current Facility-Administered Medications: dextrose 5 % and 0.45 % sodium chloride infusion, , IntraVENous, Continuous  aspirin EC tablet 81 mg, 81 mg, Oral, Daily  folic acid (FOLVITE) tablet 1 mg, 1 mg, Oral, Daily  hydroxyurea (HYDREA) chemo capsule 500 mg, 500 mg, Oral, QAM  LORazepam (ATIVAN) tablet 0.5 mg, 0.5 mg, Oral, Q6H PRN  metoprolol tartrate (LOPRESSOR) tablet 50 mg, 50 mg, Oral, Daily  pantoprazole (PROTONIX) tablet 40 mg, 40 mg, Oral, QAM AC  atorvastatin (LIPITOR) tablet 40 mg, 40  DAILY LIVING:    OCCUPATION:    Family History:       Problem Relation Age of Onset    Breast Cancer Mother     Diabetes Father     Heart Failure Sister     Diabetes Sister        General ROS: negative  Cardiovascular ROS: no chest pain or dyspnea on exertion  Respiratory ROS: no cough, shortness of breath, or wheezing  Gastrointestinal ROS: no abdominal pain, change in bowel habits, or black or bloody stools  Psych: no reported history of abnormal mentation or pressured speech   Neurological ROS: no TIA or stroke symptoms  Musculoskeletal ROS: Left shoulder pain/per HPI    PHYSICAL EXAM:    VITALS:  BP (!) 109/48   Pulse 74   Temp 97.9 °F (36.6 °C) (Oral)   Resp 16   Ht 1.575 m (5' 2\")   Wt 53.1 kg (117 lb)   SpO2 97%   BMI 21.40 kg/m²   CONSTITUTIONAL:  awake, alert, cooperative, no apparent distress, and appears stated age  CV: Distal pulses intact  Abdomen: soft non distended  Lungs: no increased work of breathing  Psych: normal mentation and non pressured speech  MUSCULOSKELETAL:  LUE  Skin intact, expected bruising and ecchymosis  Compartment soft compressible  +2 radial pulse  Sensation tact light touch about median, radial, ulnar nerves  Positive and, PIN, ulnar nerve function  No tenderness with patient over distal humerus elbow forearm or hand  There is tenderness palpation over the proximal humerus    SECONDARY EXAM    RUE: skin intact, -TTP, Radial pulses +2, cap refill <2 seconds, +sensation to radial/ulnar/median nerves sensation, +motor to AIN/PIN/ulnar nerves, compartments soft and compressible    RLE: skin intack, -TTP, DP/PT pulses +2, cap refill < 2 seconds, + sensation to sp/dp/pt/s/s nerves intact, demonstrates active plantar and dorsiflexion of ankle, compartments soft and compressible    LLE:skin intack, -TTP, DP/PT pulses +2, cap refill < 2 seconds, + sensation to sp/dp/pt/s/s nerves intact, demonstrates active plantar and dorsiflexion of ankle, compartments soft and

## 2025-05-10 NOTE — PLAN OF CARE
Problem: ABCDS Injury Assessment  Goal: Absence of physical injury  Outcome: Progressing     Problem: Discharge Planning  Goal: Discharge to home or other facility with appropriate resources  Outcome: Progressing     Problem: Chronic Conditions and Co-morbidities  Goal: Patient's chronic conditions and co-morbidity symptoms are monitored and maintained or improved  Outcome: Progressing     Problem: Pain  Goal: Verbalizes/displays adequate comfort level or baseline comfort level  Outcome: Progressing     Problem: Safety - Adult  Goal: Free from fall injury  Outcome: Progressing     Problem: Skin/Tissue Integrity  Goal: Skin integrity remains intact  Description: 1.  Monitor for areas of redness and/or skin breakdown2.  Assess vascular access sites hourly3.  Every 4-6 hours minimum:  Change oxygen saturation probe site4.  Every 4-6 hours:  If on nasal continuous positive airway pressure, respiratory therapy assess nares and determine need for appliance change or resting period  Outcome: Progressing

## 2025-05-10 NOTE — PROGRESS NOTES
[facility] Hospitalist Progress Note    Admitting Date and Time: 5/9/2025 11:58 AM  Admit Dx: Hyponatremia [E87.1]  Shoulder fracture, left, closed, initial encounter [S42.92XA]  Syncope, unspecified syncope type [R55]    Subjective:  Patient is being followed for Hyponatremia [E87.1]  Shoulder fracture, left, closed, initial encounter [S42.92XA]  Syncope, unspecified syncope type [R55]     No acute events overnight.  Patient is awaiting PT/OT evaluation and will continue conservative management for her shoulder.    ROS: denies fever, chills, cp, sob, n/v, HA unless stated above.      aspirin  81 mg Oral Daily    folic acid  1 mg Oral Daily    hydroxyurea  500 mg Oral QAM    metoprolol tartrate  50 mg Oral Daily    pantoprazole  40 mg Oral QAM AC    atorvastatin  40 mg Oral Daily    sodium chloride flush  5-40 mL IntraVENous 2 times per day    insulin lispro  0-4 Units SubCUTAneous 4x Daily AC & HS     LORazepam, 0.5 mg, Q6H PRN  sodium chloride flush, 5-40 mL, PRN  sodium chloride, , PRN  potassium chloride, 40 mEq, PRN   Or  potassium alternative oral replacement, 40 mEq, PRN   Or  potassium chloride, 10 mEq, PRN  magnesium sulfate, 2,000 mg, PRN  ondansetron, 4 mg, Q8H PRN   Or  ondansetron, 4 mg, Q6H PRN  polyethylene glycol, 17 g, Daily PRN  acetaminophen, 650 mg, Q6H PRN   Or  acetaminophen, 650 mg, Q6H PRN  dicyclomine, 20 mg, TID PRN  glucose, 4 tablet, PRN  dextrose bolus, 125 mL, PRN   Or  dextrose bolus, 250 mL, PRN  glucagon (rDNA), 1 mg, PRN  dextrose, , Continuous PRN  HYDROcodone 5 mg - acetaminophen, 1 tablet, Q6H PRN         Objective:    BP (!) 109/48   Pulse 74   Temp 97.9 °F (36.6 °C) (Oral)   Resp 16   Ht 1.575 m (5' 2\")   Wt 53.1 kg (117 lb)   SpO2 97%   BMI 21.40 kg/m²     General Appearance: alert and oriented to person, place and time and in no acute distress  Skin: warm and dry  Head: normocephalic and atraumatic  Eyes: pupils equal, round, and reactive to light, extraocular

## 2025-05-11 ENCOUNTER — APPOINTMENT (OUTPATIENT)
Age: 80
DRG: 563 | End: 2025-05-11
Attending: INTERNAL MEDICINE
Payer: MEDICARE

## 2025-05-11 LAB
ANION GAP SERPL CALCULATED.3IONS-SCNC: 11 MMOL/L (ref 7–16)
BUN SERPL-MCNC: 20 MG/DL (ref 6–23)
CALCIUM SERPL-MCNC: 8.3 MG/DL (ref 8.6–10.2)
CHLORIDE SERPL-SCNC: 103 MMOL/L (ref 98–107)
CO2 SERPL-SCNC: 25 MMOL/L (ref 22–29)
CREAT SERPL-MCNC: 0.7 MG/DL (ref 0.5–1)
ECHO AO ANNULUS INDEX: 2.13 CM/M2
ECHO AR MAX VEL PISA: 3.5 M/S
ECHO AV ANNULUS DIAM: 3.4 CM
ECHO AV AREA PEAK VELOCITY: 2 CM2
ECHO AV AREA VTI: 2.2 CM2
ECHO AV AREA/BSA PEAK VELOCITY: 1.3 CM2/M2
ECHO AV AREA/BSA VTI: 1.4 CM2/M2
ECHO AV CUSP MM: 1.1 CM
ECHO AV MEAN GRADIENT: 10 MMHG
ECHO AV MEAN VELOCITY: 1.5 M/S
ECHO AV PEAK GRADIENT: 19 MMHG
ECHO AV PEAK VELOCITY: 2.2 M/S
ECHO AV REGURGITANT PHT: 707.7 MS
ECHO AV VELOCITY RATIO: 0.64
ECHO AV VTI: 48.5 CM
ECHO BSA: 1.52 M2
ECHO EST RA PRESSURE: 3 MMHG
ECHO LA DIAMETER INDEX: 2.44 CM/M2
ECHO LA DIAMETER: 3.9 CM
ECHO LA VOL A-L A2C: 79 ML (ref 22–52)
ECHO LA VOL A-L A4C: 76 ML (ref 22–52)
ECHO LA VOL MOD A2C: 75 ML (ref 22–52)
ECHO LA VOL MOD A4C: 71 ML (ref 22–52)
ECHO LA VOLUME AREA LENGTH: 79 ML
ECHO LA VOLUME INDEX A-L A2C: 49 ML/M2 (ref 16–34)
ECHO LA VOLUME INDEX A-L A4C: 48 ML/M2 (ref 16–34)
ECHO LA VOLUME INDEX AREA LENGTH: 49 ML/M2 (ref 16–34)
ECHO LA VOLUME INDEX MOD A2C: 47 ML/M2 (ref 16–34)
ECHO LA VOLUME INDEX MOD A4C: 44 ML/M2 (ref 16–34)
ECHO LV E' LATERAL VELOCITY: 5 CM/S
ECHO LV E' SEPTAL VELOCITY: 5 CM/S
ECHO LV EF PHYSICIAN: 68 %
ECHO LV FRACTIONAL SHORTENING: 30 % (ref 28–44)
ECHO LV INTERNAL DIMENSION DIASTOLE INDEX: 2.69 CM/M2
ECHO LV INTERNAL DIMENSION DIASTOLIC: 4.3 CM (ref 3.9–5.3)
ECHO LV INTERNAL DIMENSION SYSTOLIC INDEX: 1.88 CM/M2
ECHO LV INTERNAL DIMENSION SYSTOLIC: 3 CM
ECHO LV ISOVOLUMETRIC RELAXATION TIME (IVRT): 96.9 MS
ECHO LV IVSD: 1 CM (ref 0.6–0.9)
ECHO LV IVSS: 1.4 CM
ECHO LV MASS 2D: 142.5 G (ref 67–162)
ECHO LV MASS INDEX 2D: 89.1 G/M2 (ref 43–95)
ECHO LV POSTERIOR WALL DIASTOLIC: 1 CM (ref 0.6–0.9)
ECHO LV POSTERIOR WALL SYSTOLIC: 1.4 CM
ECHO LV RELATIVE WALL THICKNESS RATIO: 0.47
ECHO LVOT AREA: 3.1 CM2
ECHO LVOT AV VTI INDEX: 0.7
ECHO LVOT DIAM: 2 CM
ECHO LVOT MEAN GRADIENT: 5 MMHG
ECHO LVOT PEAK GRADIENT: 8 MMHG
ECHO LVOT PEAK VELOCITY: 1.4 M/S
ECHO LVOT STROKE VOLUME INDEX: 66.5 ML/M2
ECHO LVOT SV: 106.4 ML
ECHO LVOT VTI: 33.9 CM
ECHO MV "A" WAVE DURATION: 161.5 MSEC
ECHO MV A VELOCITY: 1.38 M/S
ECHO MV AREA PHT: 2.1 CM2
ECHO MV AREA VTI: 2.4 CM2
ECHO MV E DECELERATION TIME (DT): 399.5 MS
ECHO MV E VELOCITY: 1 M/S
ECHO MV E/A RATIO: 0.72
ECHO MV E/E' LATERAL: 20
ECHO MV E/E' RATIO (AVERAGED): 20
ECHO MV E/E' SEPTAL: 20
ECHO MV LVOT VTI INDEX: 1.3
ECHO MV MAX VELOCITY: 1.6 M/S
ECHO MV MEAN GRADIENT: 5 MMHG
ECHO MV MEAN VELOCITY: 1.1 M/S
ECHO MV PEAK GRADIENT: 11 MMHG
ECHO MV PRESSURE HALF TIME (PHT): 107.1 MS
ECHO MV VTI: 44 CM
ECHO PV MAX VELOCITY: 1.5 M/S
ECHO PV MEAN GRADIENT: 6 MMHG
ECHO PV MEAN VELOCITY: 1.1 M/S
ECHO PV PEAK GRADIENT: 9 MMHG
ECHO PV VTI: 35.6 CM
ECHO PVEIN A DURATION: 152.3 MS
ECHO PVEIN A VELOCITY: 0.4 M/S
ECHO PVEIN PEAK D VELOCITY: 0.3 M/S
ECHO PVEIN PEAK S VELOCITY: 0.7 M/S
ECHO PVEIN S/D RATIO: 2.3
ECHO RIGHT VENTRICULAR SYSTOLIC PRESSURE (RVSP): 31 MMHG
ECHO RV BASAL DIMENSION: 4.9 CM
ECHO RV INTERNAL DIMENSION: 3 CM
ECHO RV LONGITUDINAL DIMENSION: 6.9 CM
ECHO RV MID DIMENSION: 3.8 CM
ECHO RV TAPSE: 2.6 CM (ref 1.7–?)
ECHO TV REGURGITANT MAX VELOCITY: 2.63 M/S
ECHO TV REGURGITANT PEAK GRADIENT: 28 MMHG
GFR, ESTIMATED: 83 ML/MIN/1.73M2
GLUCOSE BLD-MCNC: 105 MG/DL (ref 74–99)
GLUCOSE BLD-MCNC: 107 MG/DL (ref 74–99)
GLUCOSE BLD-MCNC: 131 MG/DL (ref 74–99)
GLUCOSE BLD-MCNC: 192 MG/DL (ref 74–99)
GLUCOSE SERPL-MCNC: 137 MG/DL (ref 74–99)
POTASSIUM SERPL-SCNC: 3.4 MMOL/L (ref 3.5–5)
SODIUM SERPL-SCNC: 139 MMOL/L (ref 132–146)

## 2025-05-11 PROCEDURE — 6370000000 HC RX 637 (ALT 250 FOR IP): Performed by: NURSE PRACTITIONER

## 2025-05-11 PROCEDURE — 6370000000 HC RX 637 (ALT 250 FOR IP): Performed by: INTERNAL MEDICINE

## 2025-05-11 PROCEDURE — 93306 TTE W/DOPPLER COMPLETE: CPT | Performed by: INTERNAL MEDICINE

## 2025-05-11 PROCEDURE — 99233 SBSQ HOSP IP/OBS HIGH 50: CPT | Performed by: STUDENT IN AN ORGANIZED HEALTH CARE EDUCATION/TRAINING PROGRAM

## 2025-05-11 PROCEDURE — 1200000000 HC SEMI PRIVATE

## 2025-05-11 PROCEDURE — 82962 GLUCOSE BLOOD TEST: CPT

## 2025-05-11 PROCEDURE — 80048 BASIC METABOLIC PNL TOTAL CA: CPT

## 2025-05-11 PROCEDURE — 6370000000 HC RX 637 (ALT 250 FOR IP): Performed by: STUDENT IN AN ORGANIZED HEALTH CARE EDUCATION/TRAINING PROGRAM

## 2025-05-11 PROCEDURE — 2500000003 HC RX 250 WO HCPCS: Performed by: INTERNAL MEDICINE

## 2025-05-11 PROCEDURE — 93306 TTE W/DOPPLER COMPLETE: CPT

## 2025-05-11 RX ORDER — OXYCODONE HYDROCHLORIDE 5 MG/1
5 TABLET ORAL EVERY 4 HOURS PRN
Refills: 0 | Status: DISCONTINUED | OUTPATIENT
Start: 2025-05-11 | End: 2025-05-11

## 2025-05-11 RX ORDER — OXYCODONE AND ACETAMINOPHEN 5; 325 MG/1; MG/1
1 TABLET ORAL EVERY 4 HOURS PRN
Refills: 0 | Status: DISCONTINUED | OUTPATIENT
Start: 2025-05-11 | End: 2025-05-14 | Stop reason: HOSPADM

## 2025-05-11 RX ADMIN — LORAZEPAM 0.5 MG: 0.5 TABLET ORAL at 13:58

## 2025-05-11 RX ADMIN — ATORVASTATIN CALCIUM 40 MG: 40 TABLET, FILM COATED ORAL at 08:59

## 2025-05-11 RX ADMIN — POTASSIUM CHLORIDE 40 MEQ: 1500 TABLET, EXTENDED RELEASE ORAL at 05:49

## 2025-05-11 RX ADMIN — SODIUM CHLORIDE, PRESERVATIVE FREE 10 ML: 5 INJECTION INTRAVENOUS at 21:02

## 2025-05-11 RX ADMIN — ASPIRIN 81 MG: 81 TABLET, COATED ORAL at 08:59

## 2025-05-11 RX ADMIN — PANTOPRAZOLE SODIUM 40 MG: 40 TABLET, DELAYED RELEASE ORAL at 05:25

## 2025-05-11 RX ADMIN — ACETAMINOPHEN 650 MG: 325 TABLET ORAL at 02:40

## 2025-05-11 RX ADMIN — OXYCODONE AND ACETAMINOPHEN 1 TABLET: 325; 5 TABLET ORAL at 20:32

## 2025-05-11 RX ADMIN — LORAZEPAM 0.5 MG: 0.5 TABLET ORAL at 21:08

## 2025-05-11 RX ADMIN — OXYCODONE 5 MG: 5 TABLET ORAL at 05:25

## 2025-05-11 RX ADMIN — OXYCODONE 5 MG: 5 TABLET ORAL at 09:32

## 2025-05-11 RX ADMIN — ACETAMINOPHEN 650 MG: 325 TABLET ORAL at 08:58

## 2025-05-11 RX ADMIN — SODIUM CHLORIDE, PRESERVATIVE FREE 10 ML: 5 INJECTION INTRAVENOUS at 09:00

## 2025-05-11 RX ADMIN — OXYCODONE AND ACETAMINOPHEN 1 TABLET: 325; 5 TABLET ORAL at 11:46

## 2025-05-11 RX ADMIN — HYDROCODONE BITARTRATE AND ACETAMINOPHEN 1 TABLET: 5; 325 TABLET ORAL at 00:32

## 2025-05-11 RX ADMIN — INSULIN LISPRO 1 UNITS: 100 INJECTION, SOLUTION INTRAVENOUS; SUBCUTANEOUS at 21:06

## 2025-05-11 RX ADMIN — METOPROLOL TARTRATE 50 MG: 50 TABLET, FILM COATED ORAL at 08:59

## 2025-05-11 RX ADMIN — FOLIC ACID 1 MG: 1 TABLET ORAL at 08:59

## 2025-05-11 RX ADMIN — OXYCODONE AND ACETAMINOPHEN 1 TABLET: 325; 5 TABLET ORAL at 16:11

## 2025-05-11 RX ADMIN — HYDROXYUREA 500 MG: 500 CAPSULE ORAL at 08:58

## 2025-05-11 ASSESSMENT — PAIN SCALES - GENERAL
PAINLEVEL_OUTOF10: 2
PAINLEVEL_OUTOF10: 9
PAINLEVEL_OUTOF10: 8
PAINLEVEL_OUTOF10: 9
PAINLEVEL_OUTOF10: 6
PAINLEVEL_OUTOF10: 9
PAINLEVEL_OUTOF10: 6
PAINLEVEL_OUTOF10: 8
PAINLEVEL_OUTOF10: 4
PAINLEVEL_OUTOF10: 9
PAINLEVEL_OUTOF10: 7
PAINLEVEL_OUTOF10: 6
PAINLEVEL_OUTOF10: 9
PAINLEVEL_OUTOF10: 7

## 2025-05-11 ASSESSMENT — PAIN DESCRIPTION - ORIENTATION
ORIENTATION: LEFT

## 2025-05-11 ASSESSMENT — PAIN DESCRIPTION - LOCATION
LOCATION: SHOULDER
LOCATION: SHOULDER
LOCATION: ARM;COCCYX
LOCATION: SHOULDER
LOCATION: ARM
LOCATION: ARM
LOCATION: SHOULDER
LOCATION: ARM;COCCYX

## 2025-05-11 ASSESSMENT — PAIN DESCRIPTION - DESCRIPTORS
DESCRIPTORS: ACHING;DISCOMFORT;SHARP
DESCRIPTORS: ACHING;DISCOMFORT
DESCRIPTORS: ACHING
DESCRIPTORS: ACHING;DISCOMFORT
DESCRIPTORS: ACHING
DESCRIPTORS: ACHING;SHARP

## 2025-05-11 ASSESSMENT — PAIN DESCRIPTION - ONSET
ONSET: ON-GOING

## 2025-05-11 ASSESSMENT — PAIN DESCRIPTION - PAIN TYPE
TYPE: ACUTE PAIN

## 2025-05-11 ASSESSMENT — PAIN DESCRIPTION - FREQUENCY
FREQUENCY: CONTINUOUS
FREQUENCY: INTERMITTENT
FREQUENCY: CONTINUOUS

## 2025-05-11 NOTE — PROGRESS NOTES
to light, extraocular eye movements intact, conjunctivae normal  Neck: neck supple and non tender without mass   Pulmonary/Chest: clear to auscultation bilaterally- no wheezes, rales or rhonchi, normal air movement, no respiratory distress  Cardiovascular: normal rate, normal S1 and S2 and no carotid bruits  Abdomen: soft, non-tender, non-distended, normal bowel sounds, no masses or organomegaly  Extremities: no cyanosis, no clubbing and no edema  Neurologic: no cranial nerve deficit and speech normal        Recent Labs     05/09/25  1213 05/10/25  0220 05/11/25  0305   * 139 139   K 3.5 3.6 3.4*   CL 91* 102 103   CO2 22 25 25   BUN 14 17 20   CREATININE 0.5 0.7 0.7   GLUCOSE 153* 116* 137*   CALCIUM 7.8* 8.7 8.3*       Recent Labs     05/09/25  1213   WBC 8.4   RBC 2.77*   HGB 10.3*   HCT 32.1*   .9*   MCH 37.2*   MCHC 32.1   RDW 14.7      MPV 9.7       Radiology:     THREE XRAY VIEWS OF THE LEFT SHOULDER5/9/2025 12:23 pm  IMPRESSION:  1. Age-indeterminate oblique fracture of the surgical neck of the humerus.  2. Advanced osteoarthritic changes at the glenohumeral joint.     ONE XRAY VIEW OF THE PELVIS5/9/2025 12:23 pm   IMPRESSION:  No acute fracture or dislocation.    IMPRESSION:  CT HEAD:     1. No acute intracranial abnormality.  2. Mild generalized cerebral volume loss. Mild-to-moderate chronic  microvascular ischemic changes.  CT CERVICAL SPINE:     1. No acute fracture or traumatic malalignment.  2. Status post anterior cervical discectomy with fusion from C3 to C6. No  hardware complication.  3. Innumerable, grossly stable, lucencies in the visualized spine.  An  underlying pathological infiltrative process cannot be excluded.  4. 1.8 cm right thyroid lobe nodule.  Further evaluation with thyroid  ultrasound recommended.       Assessment:    Principal Problem:    Hyponatremia  Active Problems:    Shoulder fracture, left, closed, initial encounter    Syncope  Resolved Problems:    * No  resolved hospital problems. *      Plan:    Syncope and collapse  - Check orthostatic vital signs  - Check echocardiogram  - Monitor on telemetry    Left proximal humerus fracture  Multiple lucencies in the cervical spine on CT  Fall at home  -Nonweightbearing left upper extremity  - PT/OT  - Pain control  - Sling for comfort  - No orthopedic intervention of humerus fracture  - Awaiting spine orthopedic recommendation regarding CT findings of cervical spine    Hyponatremia  - Sodium 126 on admission, improved to 139 in less than 24 hours so will give D5 in 1/2 NS  - Check BMP    Chronic chron's disease without complication  -Stable  - Continue Bentyl    Thrombocytosis  - Platelet counts are stable  - Continue hydroxyurea     Chronic type 2 diabetes  - Patient is not on any medication at home, looks like diet controlled  - Continue sliding scale insulin with hypoglycemia protocol    Chronic CAD s/p PCI  - Continue aspirin, metoprolol and statin        NOTE: This report was transcribed using voice recognition software. Every effort was made to ensure accuracy; however, inadvertent computerized transcription errors may be present.  Electronically signed by Hebert Noel MD on 5/11/2025 at 10:00 AM

## 2025-05-11 NOTE — PLAN OF CARE
Problem: ABCDS Injury Assessment  Goal: Absence of physical injury  5/11/2025 1228 by Barbara Reyes RN  Outcome: Progressing  5/11/2025 0114 by Gemma Avalos RN  Outcome: Progressing     Problem: Discharge Planning  Goal: Discharge to home or other facility with appropriate resources  5/11/2025 1228 by Barbara Reyes RN  Outcome: Progressing  5/11/2025 0114 by Gemma Avalos RN  Outcome: Progressing     Problem: Chronic Conditions and Co-morbidities  Goal: Patient's chronic conditions and co-morbidity symptoms are monitored and maintained or improved  5/11/2025 1228 by Barbara Reyes RN  Outcome: Progressing  5/11/2025 0114 by Gemma Avalos RN  Outcome: Progressing     Problem: Pain  Goal: Verbalizes/displays adequate comfort level or baseline comfort level  5/11/2025 1228 by Barbara Reyes RN  Outcome: Progressing  5/11/2025 0114 by Gemma Avalos RN  Outcome: Progressing     Problem: Safety - Adult  Goal: Free from fall injury  5/11/2025 1228 by Barbara Reyes RN  Outcome: Progressing  5/11/2025 0114 by Gemma Avalos RN  Outcome: Progressing     Problem: Skin/Tissue Integrity  Goal: Skin integrity remains intact  Description: 1.  Monitor for areas of redness and/or skin breakdown2.  Assess vascular access sites hourly3.  Every 4-6 hours minimum:  Change oxygen saturation probe site4.  Every 4-6 hours:  If on nasal continuous positive airway pressure, respiratory therapy assess nares and determine need for appliance change or resting period  5/11/2025 1228 by Barbara Reyes RN  Outcome: Progressing  5/11/2025 0114 by Gemma Avalos RN  Outcome: Progressing  Flowsheets (Taken 5/11/2025 0114)  Skin Integrity Remains Intact: Monitor for areas of redness and/or skin breakdown

## 2025-05-11 NOTE — CONSULTS
[place] [address]                              CONSULTATION      PATIENT NAME: CHRIS RENNER               : 1945  MED REC NO: [id number]                        ROOM: 0522  ACCOUNT NO: [id number]                       ADMIT DATE: 2025  PROVIDER: Lang Vogt DO      CONSULT DATE: 2025    IDENTIFICATION:  80-year-old female.    REASON FOR CONSULTATION:  Cervical pain.    HISTORY OF CHIEF COMPLAINT:  The patient is an 80-year-old female, who presented to the hospital through the emergency room department.  She actually stated when she was at home, she had just gotten home from being in the hospital a month for gallbladder.  She stated a few days after that she states that she fell multiple times overnight.  She said she had mechanical fall, where she struck her head, then she fell backwards and struck her left shoulder onto the bathtub, she stated.  She stated that she was having significant trouble getting up and walking, so she crawled across a carpeted surface and bruised her back and also has rug burns in relation to this crawl.  Today, she complains of just left shoulder pain and discomfort a little bit in her whole shoulder, into her chest and around the shoulder girdle to the back of her chest near her shoulder blade.  She is unable to lift her shoulder because of pain.  She has some chronic problems related to her neck, but she has no radicular complaints down her left or right upper extremity.  It is noted that her pain is more isolated from her fracture in this region.  She denies any bladder or bowel incontinence.  She is noted to have a cervical fusion, but she does not remember who did it.  It has been done many years ago.    Please see the office chart for the medical history and surgical history.    PHYSICAL EXAMINATION:  The patient is lying supine in the bed.  She is very talkative,  did not want to proceed with any surgical intervention at 80 and she really has no symptomatology related to this.  So, I think conservative measures would be appropriate, that will get better evaluated down the road if she wished to proceed with shots or blocks.          DAIN RITCHIE DO      D:  05/11/2025 13:27:43     T:  05/11/2025 15:33:46     JANE/MICAELA  Job #:  [id number]     Doc#:  [id number]

## 2025-05-11 NOTE — PROGRESS NOTES
Physical Therapy  Facility/Department: [address] MED SURG    Name: Marilu Marcos  : 1945  MRN: [id number]  Date of Service: 2025    PT eval was attempted and pt was found with lunch tray in front of her and son present in the room.  Pt hadn't really touched her lunch which has been in front of her for about 30 min.  She states she doesn't want to do PT at this particular time as she is going to attempt to eat some and wants to visit with her son.  Will re-attempt PT eval another time.    Bird Najera Jr., P.T.  License Number: [id number]

## 2025-05-12 LAB
ANION GAP SERPL CALCULATED.3IONS-SCNC: 11 MMOL/L (ref 7–16)
BASOPHILS # BLD: 0.13 K/UL (ref 0–0.2)
BASOPHILS NFR BLD: 2 % (ref 0–2)
BUN SERPL-MCNC: 15 MG/DL (ref 6–23)
CALCIUM SERPL-MCNC: 9 MG/DL (ref 8.6–10.2)
CHLORIDE SERPL-SCNC: 105 MMOL/L (ref 98–107)
CO2 SERPL-SCNC: 23 MMOL/L (ref 22–29)
CREAT SERPL-MCNC: 0.6 MG/DL (ref 0.5–1)
EOSINOPHIL # BLD: 0.06 K/UL (ref 0.05–0.5)
EOSINOPHILS RELATIVE PERCENT: 1 % (ref 0–6)
ERYTHROCYTE [DISTWIDTH] IN BLOOD BY AUTOMATED COUNT: 15.1 % (ref 11.5–15)
GFR, ESTIMATED: 89 ML/MIN/1.73M2
GLUCOSE BLD-MCNC: 103 MG/DL (ref 74–99)
GLUCOSE BLD-MCNC: 115 MG/DL (ref 74–99)
GLUCOSE BLD-MCNC: 128 MG/DL (ref 74–99)
GLUCOSE BLD-MCNC: 204 MG/DL (ref 74–99)
GLUCOSE SERPL-MCNC: 183 MG/DL (ref 74–99)
HCT VFR BLD AUTO: 28.1 % (ref 34–48)
HGB BLD-MCNC: 8.7 G/DL (ref 11.5–15.5)
LYMPHOCYTES NFR BLD: 1.04 K/UL (ref 1.5–4)
LYMPHOCYTES RELATIVE PERCENT: 13 % (ref 20–42)
MCH RBC QN AUTO: 36.9 PG (ref 26–35)
MCHC RBC AUTO-ENTMCNC: 31 G/DL (ref 32–34.5)
MCV RBC AUTO: 119.1 FL (ref 80–99.9)
MONOCYTES NFR BLD: 0.78 K/UL (ref 0.1–0.95)
MONOCYTES NFR BLD: 10 % (ref 2–12)
NEUTROPHILS NFR BLD: 74 % (ref 43–80)
NEUTS SEG NFR BLD: 5.69 K/UL (ref 1.8–7.3)
PLATELET # BLD AUTO: 378 K/UL (ref 130–450)
PMV BLD AUTO: 10.1 FL (ref 7–12)
POTASSIUM SERPL-SCNC: 4.1 MMOL/L (ref 3.5–5)
RBC # BLD AUTO: 2.36 M/UL (ref 3.5–5.5)
RBC # BLD: ABNORMAL 10*6/UL
SODIUM SERPL-SCNC: 139 MMOL/L (ref 132–146)
WBC OTHER # BLD: 7.7 K/UL (ref 4.5–11.5)

## 2025-05-12 PROCEDURE — 97161 PT EVAL LOW COMPLEX 20 MIN: CPT

## 2025-05-12 PROCEDURE — 99232 SBSQ HOSP IP/OBS MODERATE 35: CPT | Performed by: STUDENT IN AN ORGANIZED HEALTH CARE EDUCATION/TRAINING PROGRAM

## 2025-05-12 PROCEDURE — 2500000003 HC RX 250 WO HCPCS: Performed by: INTERNAL MEDICINE

## 2025-05-12 PROCEDURE — 6370000000 HC RX 637 (ALT 250 FOR IP): Performed by: INTERNAL MEDICINE

## 2025-05-12 PROCEDURE — 80048 BASIC METABOLIC PNL TOTAL CA: CPT

## 2025-05-12 PROCEDURE — 97165 OT EVAL LOW COMPLEX 30 MIN: CPT

## 2025-05-12 PROCEDURE — 6360000002 HC RX W HCPCS: Performed by: STUDENT IN AN ORGANIZED HEALTH CARE EDUCATION/TRAINING PROGRAM

## 2025-05-12 PROCEDURE — 6370000000 HC RX 637 (ALT 250 FOR IP): Performed by: STUDENT IN AN ORGANIZED HEALTH CARE EDUCATION/TRAINING PROGRAM

## 2025-05-12 PROCEDURE — 97535 SELF CARE MNGMENT TRAINING: CPT

## 2025-05-12 PROCEDURE — 6370000000 HC RX 637 (ALT 250 FOR IP)

## 2025-05-12 PROCEDURE — 1200000000 HC SEMI PRIVATE

## 2025-05-12 PROCEDURE — 82962 GLUCOSE BLOOD TEST: CPT

## 2025-05-12 PROCEDURE — 85025 COMPLETE CBC W/AUTO DIFF WBC: CPT

## 2025-05-12 PROCEDURE — 97530 THERAPEUTIC ACTIVITIES: CPT

## 2025-05-12 RX ORDER — ENOXAPARIN SODIUM 100 MG/ML
40 INJECTION SUBCUTANEOUS DAILY
Status: DISCONTINUED | OUTPATIENT
Start: 2025-05-12 | End: 2025-05-14 | Stop reason: HOSPADM

## 2025-05-12 RX ADMIN — FOLIC ACID 1 MG: 1 TABLET ORAL at 08:57

## 2025-05-12 RX ADMIN — ENOXAPARIN SODIUM 40 MG: 100 INJECTION SUBCUTANEOUS at 14:54

## 2025-05-12 RX ADMIN — LORAZEPAM 0.5 MG: 0.5 TABLET ORAL at 20:30

## 2025-05-12 RX ADMIN — OXYCODONE AND ACETAMINOPHEN 1 TABLET: 325; 5 TABLET ORAL at 13:34

## 2025-05-12 RX ADMIN — PANTOPRAZOLE SODIUM 40 MG: 40 TABLET, DELAYED RELEASE ORAL at 06:15

## 2025-05-12 RX ADMIN — HYDROXYUREA 500 MG: 500 CAPSULE ORAL at 08:57

## 2025-05-12 RX ADMIN — LORAZEPAM 0.5 MG: 0.5 TABLET ORAL at 04:16

## 2025-05-12 RX ADMIN — METOPROLOL TARTRATE 50 MG: 50 TABLET, FILM COATED ORAL at 08:56

## 2025-05-12 RX ADMIN — OXYCODONE AND ACETAMINOPHEN 1 TABLET: 325; 5 TABLET ORAL at 17:39

## 2025-05-12 RX ADMIN — SODIUM CHLORIDE, PRESERVATIVE FREE 10 ML: 5 INJECTION INTRAVENOUS at 08:57

## 2025-05-12 RX ADMIN — OXYCODONE AND ACETAMINOPHEN 1 TABLET: 325; 5 TABLET ORAL at 00:29

## 2025-05-12 RX ADMIN — OXYCODONE AND ACETAMINOPHEN 1 TABLET: 325; 5 TABLET ORAL at 04:46

## 2025-05-12 RX ADMIN — OXYCODONE AND ACETAMINOPHEN 1 TABLET: 325; 5 TABLET ORAL at 22:03

## 2025-05-12 RX ADMIN — INSULIN LISPRO 1 UNITS: 100 INJECTION, SOLUTION INTRAVENOUS; SUBCUTANEOUS at 20:33

## 2025-05-12 RX ADMIN — ATORVASTATIN CALCIUM 40 MG: 40 TABLET, FILM COATED ORAL at 08:56

## 2025-05-12 RX ADMIN — LORAZEPAM 0.5 MG: 0.5 TABLET ORAL at 15:02

## 2025-05-12 RX ADMIN — ASPIRIN 81 MG: 81 TABLET, COATED ORAL at 08:57

## 2025-05-12 RX ADMIN — Medication 6 MG: at 20:30

## 2025-05-12 RX ADMIN — OXYCODONE AND ACETAMINOPHEN 1 TABLET: 325; 5 TABLET ORAL at 08:57

## 2025-05-12 RX ADMIN — SODIUM CHLORIDE, PRESERVATIVE FREE 10 ML: 5 INJECTION INTRAVENOUS at 20:23

## 2025-05-12 RX ADMIN — ACETAMINOPHEN 650 MG: 325 TABLET ORAL at 06:14

## 2025-05-12 ASSESSMENT — PAIN DESCRIPTION - DESCRIPTORS
DESCRIPTORS: NAGGING
DESCRIPTORS: DISCOMFORT
DESCRIPTORS: ACHING;DISCOMFORT
DESCRIPTORS: ACHING;DISCOMFORT
DESCRIPTORS: DISCOMFORT;SORE
DESCRIPTORS: DISCOMFORT;SORE
DESCRIPTORS: DISCOMFORT

## 2025-05-12 ASSESSMENT — PAIN DESCRIPTION - ORIENTATION
ORIENTATION: LEFT

## 2025-05-12 ASSESSMENT — PAIN SCALES - GENERAL
PAINLEVEL_OUTOF10: 8
PAINLEVEL_OUTOF10: 7
PAINLEVEL_OUTOF10: 7
PAINLEVEL_OUTOF10: 8

## 2025-05-12 ASSESSMENT — PAIN SCALES - WONG BAKER
WONGBAKER_NUMERICALRESPONSE: HURTS A LITTLE BIT

## 2025-05-12 ASSESSMENT — PAIN - FUNCTIONAL ASSESSMENT: PAIN_FUNCTIONAL_ASSESSMENT: PREVENTS OR INTERFERES SOME ACTIVE ACTIVITIES AND ADLS

## 2025-05-12 ASSESSMENT — PAIN DESCRIPTION - LOCATION
LOCATION: ARM
LOCATION: SHOULDER
LOCATION: ARM
LOCATION: ARM
LOCATION: SHOULDER
LOCATION: ARM
LOCATION: SHOULDER

## 2025-05-12 NOTE — PROGRESS NOTES
Physical Therapy  Facility/Department: [address] MED SURG  Physical Therapy Initial Assessment    Name: Marilu Marcos  : 1945  MRN: [id number]  Date of Service: 2025    Attending Provider:  Hebert Noel MD    Evaluating PT:  Bird Najera Jr., P.T.    Room #:  [facility]/0522-A  Diagnosis:  Hyponatremia [E87.1]  Shoulder fracture, left, closed, initial encounter [S42.92XA]  Syncope, unspecified syncope type [R55], pt states she became light headed in the BR and fell.   Imaging: x-ray L shoulder shows slightly displaced proximal humerus  Precautions:  NWB LUE, falls, bed/chair alarm    SUBJECTIVE:    Pt lives alone in a 1 floor condo with 1 small step to enter.  Pt ambulated with a ww PTA.  She states she was in [facility] for 1 month and was just home for a day or 2 when she fell at home prior to this admission.     OBJECTIVE:   Initial Evaluation  Date: 25 Treatment Short Term/ Long Term   Goals   Was pt agreeable to Eval/treatment? yes     Does pt have pain? C/o LUE pain 9/10     Bed Mobility  Rolling: MOD A  Supine to sit: MOD A  Sit to supine: NA  Scooting: MOD A to EOB  Independent    Transfers Sit to stand: MOD A  Stand to sit: MIN A  Stand pivot: MIN A holding my hand  SBA   Ambulation   3 feet holding my hand and MIN A for balance.  100 feet with AAD SBA   Stair negotiation: ascended and descended NA  1 step with a cane or 1 rail SBA   AM-PAC 6 Clicks        BLE ROM is WFL.   BLE strength is grossly 4/5.   Sensation:  Pt reports intermittent numbness and tingling to her fingers  Balance: sitting is SBA and standing with no AD is MIN A  Endurance: fair-    Patient education  Pt educated on NWB LUE    Patient response to education:   Pt verbalized understanding Pt demonstrated skill Pt requires further education in this area   yes yes yes     ASSESSMENT:    Conditions Requiring Skilled Therapeutic Intervention:    [x]Decreased strength     []Decreased ROM  [x]Decreased functional

## 2025-05-12 NOTE — CARE COORDINATION
Social Work discharge planning  Pt lives alone. She was in [facility] for about a month. She has [facility] now. Pt is trying to work out with family and hhc a schedule with ostomy care and personal care that she can go home. However, we discussed her PT 13 OT 13 AMPACS today. Gave her snf list. Pt said vivi will discuss with daughter tonight when she visits to give SW discharge plan decision by tomorrow am.  Electronically signed by JESUS Lo on 5/12/2025 at 2:45 PM

## 2025-05-12 NOTE — PROGRESS NOTES
[facility] Hospitalist Progress Note    Admitting Date and Time: 5/9/2025 11:58 AM  Admit Dx: Hyponatremia [E87.1]  Shoulder fracture, left, closed, initial encounter [S42.92XA]  Syncope, unspecified syncope type [R55]    Subjective:  Patient is being followed for Hyponatremia [E87.1]  Shoulder fracture, left, closed, initial encounter [S42.92XA]  Syncope, unspecified syncope type [R55]     No acute events overnight.  Patient is awaiting PT/OT evaluation and will continue conservative management for her shoulder.  Repeated BMP noted with sodium 139 today.    ROS: denies fever, chills, cp, sob, n/v, HA unless stated above.      aspirin  81 mg Oral Daily    folic acid  1 mg Oral Daily    hydroxyurea  500 mg Oral QAM    metoprolol tartrate  50 mg Oral Daily    pantoprazole  40 mg Oral QAM AC    atorvastatin  40 mg Oral Daily    sodium chloride flush  5-40 mL IntraVENous 2 times per day    insulin lispro  0-4 Units SubCUTAneous 4x Daily AC & HS     oxyCODONE-acetaminophen, 1 tablet, Q4H PRN  LORazepam, 0.5 mg, Q6H PRN  sodium chloride flush, 5-40 mL, PRN  sodium chloride, , PRN  potassium chloride, 40 mEq, PRN   Or  potassium alternative oral replacement, 40 mEq, PRN   Or  potassium chloride, 10 mEq, PRN  magnesium sulfate, 2,000 mg, PRN  ondansetron, 4 mg, Q8H PRN   Or  ondansetron, 4 mg, Q6H PRN  polyethylene glycol, 17 g, Daily PRN  acetaminophen, 650 mg, Q6H PRN   Or  acetaminophen, 650 mg, Q6H PRN  dicyclomine, 20 mg, TID PRN  glucose, 4 tablet, PRN  dextrose bolus, 125 mL, PRN   Or  dextrose bolus, 250 mL, PRN  glucagon (rDNA), 1 mg, PRN  dextrose, , Continuous PRN         Objective:    BP (!) 127/55   Pulse 78   Temp 98.1 °F (36.7 °C) (Oral)   Resp 18   Ht 1.575 m (5' 2\")   Wt 59.9 kg (132 lb)   SpO2 96%   BMI 24.14 kg/m²     General Appearance: alert and oriented to person, place and time and in no acute distress  Skin: warm and dry  Head: normocephalic and atraumatic  Eyes: pupils equal, round,

## 2025-05-12 NOTE — ACP (ADVANCE CARE PLANNING)
Advance Care Planning   Healthcare Decision Maker:    Primary Decision Maker: Gemma Hussein - Child - [phone]    Secondary Decision Maker: Royce Marcos \"Harjeet\" - Child - [phone]    Click here to complete Healthcare Decision Makers including selection of the Healthcare Decision Maker Relationship (ie \"Primary\").

## 2025-05-12 NOTE — PATIENT CARE CONFERENCE
P Quality Flow/Interdisciplinary Rounds Progress Note        Quality Flow Rounds held on May 12, 2025    Disciplines Attending:  Bedside Nurse, , , and Nursing Unit Leadership    Marilu Marcos was admitted on 5/9/2025 11:58 AM    Anticipated Discharge Date:       Disposition:    Phoenix Score:  Phoenix Scale Score: 18    [facility] RISK OF UNPLANNED READMISSION 2.0             18.1 Total Score        Discussed patient goal for the day, patient clinical progression, and barriers to discharge.  The following Goal(s) of the Day/Commitment(s) have been identified:  Labs - Report Results      Kacy Montilla, SHERLY  May 12, 2025

## 2025-05-12 NOTE — PROGRESS NOTES
Occupational Therapy  OCCUPATIONAL THERAPY INITIAL EVALUATION  [facility]  [address], [place]    Date: 2025     Patient Name: Marilu Marcos  MRN: [id number]  : 1945  Room: [address] [place] [address]    Evaluating OT: Natasha Pan, OTR/L - [id number]    Referring Provider: Ivan Kaufman MD   Specific Provider Orders/Date: \"OT eval and treat\" - 2025    Diagnosis: Hyponatremia [E87.1]  Shoulder fracture, left, closed, initial encounter [S42.92XA]  Syncope, unspecified syncope type [R55]      Pertinent Medical History: arthritis, DM, HLD, HTN, myelodysplastic disease, hip ORIF, colostomy, cervical fusion    Precautions: fall risk, NWB LUE, sling to LUE for comfort    Assessment of Current Deficits:    [x] Functional mobility   [x]ADLs  [x] Strength               []Cognition   [x] Functional transfers   [x] IADLs         [x] Safety Awareness   [x]Endurance   [] Fine Coordination              [x] Balance      [] Vision/perception   []Sensation    []Gross Motor Coordination  [] ROM  [] Delirium                   [] Motor Control     OT PLAN OF CARE   OT POC is based on physician orders, patient diagnosis, and results of clinical assessment.  Frequency/Duration 2-5 days/week for 2 weeks PRN   Specific OT Treatment Interventions to Include:   * Instruction/training on adapted ADL techniques and AE recommendations to increase functional independence within precautions       * Training on energy conservation strategies, correct breathing pattern and techniques to improve independence/tolerance for self-care routine  * Functional transfer/mobility training/DME recommendations for increased independence, safety, and fall prevention  * Patient/Family education to increase follow through with safety techniques and functional independence  * Recommendation of environmental modifications for increased safety with functional transfers/mobility and ADLs  *

## 2025-05-12 NOTE — FLOWSHEET NOTE
Inpatient Wound Care (initial consult) 522    Admit Date: 5/9/2025 11:58 AM    Reason for consult:  ileostomy    Patient is laying down in bed, awake, alert and oriented. Patient has left arm in a sling. Assist of one person to roll. OT at bedside.     Significant history:  per H&P    Perpetual assessment: 80-year-old female with past medical history of Crohn's disease, thrombocytosis, diabetes and coronary artery disease presents with a fall.     Past Medical History:   Diagnosis Date    Arthritis     Crohn disease (HCC)     Diabetes mellitus (HCC)     Hyperlipidemia     Hypertension     Insomnia     Myelodysplastic disease (HCC)     Ulcerative colitis with intestinal obstruction (HCC)     Unspecified diseases of blood and blood-forming organs      Findings:     05/12/25 1317   Ileostomy Ileostomy   No Placement Date or Time found.   Pre-existing: Yes  Ileostomy Type: Ileostomy   Stomal Appliance 1 piece;Flat;Clean, dry & intact   Stoma  Assessment TETE   Peristomal Assessment TETE   Mucocutaneous Junction   (TETE)   Stool Appearance Soft   Stool Color Brown   Stool Amount Small   Output (mL) 200 ml   Skin Integrity Site 2   Skin Integrity Location 2 Ecchymosis   Location 2 BUE/BLE   Skin Integrity Site 3   Skin Integrity Location 3 Redness;Other (comment)  (with blanching)    Location 3 bilateral buttocks   Skin Integrity Site 4   Skin Integrity Location 4 Other (comment)  (intact blanching dry flaky)  Both heels   Skin Integrity Site 5   Skin Integrity Location 5 Other (comment)  (intact dry scab)   Location 5 right tibia, left arm     Bilateral buttocks   Right tibia    **Informed Consent**    The patient has given verbal consent to have photos taken of wound and inserted into their chart as part of their permanent medical record for purposes of documentation, treatment management and/or medical review.   All Images taken on 5/12/25 of patient name: Marilu Marcos were transmitted and stored on secured Epic

## 2025-05-13 ENCOUNTER — CARE COORDINATION (OUTPATIENT)
Dept: CARE COORDINATION | Age: 80
End: 2025-05-13

## 2025-05-13 VITALS
HEIGHT: 62 IN | DIASTOLIC BLOOD PRESSURE: 56 MMHG | OXYGEN SATURATION: 97 % | RESPIRATION RATE: 18 BRPM | BODY MASS INDEX: 23.81 KG/M2 | SYSTOLIC BLOOD PRESSURE: 112 MMHG | HEART RATE: 71 BPM | TEMPERATURE: 98.1 F | WEIGHT: 129.41 LBS

## 2025-05-13 LAB
ANION GAP SERPL CALCULATED.3IONS-SCNC: 9 MMOL/L (ref 7–16)
BASOPHILS # BLD: 0.09 K/UL (ref 0–0.2)
BASOPHILS NFR BLD: 2 % (ref 0–2)
BUN SERPL-MCNC: 18 MG/DL (ref 6–23)
CALCIUM SERPL-MCNC: 8.7 MG/DL (ref 8.6–10.2)
CHLORIDE SERPL-SCNC: 107 MMOL/L (ref 98–107)
CO2 SERPL-SCNC: 24 MMOL/L (ref 22–29)
CREAT SERPL-MCNC: 0.7 MG/DL (ref 0.5–1)
EOSINOPHIL # BLD: 0.24 K/UL (ref 0.05–0.5)
EOSINOPHILS RELATIVE PERCENT: 4 % (ref 0–6)
ERYTHROCYTE [DISTWIDTH] IN BLOOD BY AUTOMATED COUNT: 15.2 % (ref 11.5–15)
GFR, ESTIMATED: 83 ML/MIN/1.73M2
GLUCOSE BLD-MCNC: 115 MG/DL (ref 74–99)
GLUCOSE BLD-MCNC: 131 MG/DL (ref 74–99)
GLUCOSE BLD-MCNC: 156 MG/DL (ref 74–99)
GLUCOSE BLD-MCNC: 97 MG/DL (ref 74–99)
GLUCOSE SERPL-MCNC: 87 MG/DL (ref 74–99)
HCT VFR BLD AUTO: 28.4 % (ref 34–48)
HGB BLD-MCNC: 8.2 G/DL (ref 11.5–15.5)
IMM GRANULOCYTES # BLD AUTO: 0.04 K/UL (ref 0–0.58)
IMM GRANULOCYTES NFR BLD: 1 % (ref 0–5)
LYMPHOCYTES NFR BLD: 0.77 K/UL (ref 1.5–4)
LYMPHOCYTES RELATIVE PERCENT: 13 % (ref 20–42)
MCH RBC QN AUTO: 37.1 PG (ref 26–35)
MCHC RBC AUTO-ENTMCNC: 28.9 G/DL (ref 32–34.5)
MCV RBC AUTO: 128.5 FL (ref 80–99.9)
MONOCYTES NFR BLD: 0.65 K/UL (ref 0.1–0.95)
MONOCYTES NFR BLD: 11 % (ref 2–12)
NEUTROPHILS NFR BLD: 70 % (ref 43–80)
NEUTS SEG NFR BLD: 4.2 K/UL (ref 1.8–7.3)
PLATELET # BLD AUTO: 343 K/UL (ref 130–450)
PMV BLD AUTO: 10.1 FL (ref 7–12)
POTASSIUM SERPL-SCNC: 4.4 MMOL/L (ref 3.5–5)
RBC # BLD AUTO: 2.21 M/UL (ref 3.5–5.5)
RBC # BLD: ABNORMAL 10*6/UL
SODIUM SERPL-SCNC: 140 MMOL/L (ref 132–146)
WBC OTHER # BLD: 6 K/UL (ref 4.5–11.5)

## 2025-05-13 PROCEDURE — 6360000002 HC RX W HCPCS: Performed by: STUDENT IN AN ORGANIZED HEALTH CARE EDUCATION/TRAINING PROGRAM

## 2025-05-13 PROCEDURE — 6370000000 HC RX 637 (ALT 250 FOR IP): Performed by: STUDENT IN AN ORGANIZED HEALTH CARE EDUCATION/TRAINING PROGRAM

## 2025-05-13 PROCEDURE — 85025 COMPLETE CBC W/AUTO DIFF WBC: CPT

## 2025-05-13 PROCEDURE — 80048 BASIC METABOLIC PNL TOTAL CA: CPT

## 2025-05-13 PROCEDURE — 36415 COLL VENOUS BLD VENIPUNCTURE: CPT

## 2025-05-13 PROCEDURE — 82962 GLUCOSE BLOOD TEST: CPT

## 2025-05-13 PROCEDURE — 99239 HOSP IP/OBS DSCHRG MGMT >30: CPT | Performed by: STUDENT IN AN ORGANIZED HEALTH CARE EDUCATION/TRAINING PROGRAM

## 2025-05-13 PROCEDURE — 2500000003 HC RX 250 WO HCPCS: Performed by: INTERNAL MEDICINE

## 2025-05-13 PROCEDURE — 6370000000 HC RX 637 (ALT 250 FOR IP): Performed by: INTERNAL MEDICINE

## 2025-05-13 RX ORDER — OXYCODONE AND ACETAMINOPHEN 5; 325 MG/1; MG/1
1 TABLET ORAL EVERY 4 HOURS PRN
Qty: 4 TABLET | Refills: 0 | Status: SHIPPED | OUTPATIENT
Start: 2025-05-13 | End: 2025-05-16

## 2025-05-13 RX ADMIN — LORAZEPAM 0.5 MG: 0.5 TABLET ORAL at 21:24

## 2025-05-13 RX ADMIN — METOPROLOL TARTRATE 50 MG: 50 TABLET, FILM COATED ORAL at 08:53

## 2025-05-13 RX ADMIN — ATORVASTATIN CALCIUM 40 MG: 40 TABLET, FILM COATED ORAL at 08:53

## 2025-05-13 RX ADMIN — LORAZEPAM 0.5 MG: 0.5 TABLET ORAL at 14:51

## 2025-05-13 RX ADMIN — FOLIC ACID 1 MG: 1 TABLET ORAL at 08:53

## 2025-05-13 RX ADMIN — ACETAMINOPHEN 650 MG: 325 TABLET ORAL at 21:34

## 2025-05-13 RX ADMIN — SODIUM CHLORIDE, PRESERVATIVE FREE 10 ML: 5 INJECTION INTRAVENOUS at 08:52

## 2025-05-13 RX ADMIN — OXYCODONE AND ACETAMINOPHEN 1 TABLET: 325; 5 TABLET ORAL at 07:58

## 2025-05-13 RX ADMIN — HYDROXYUREA 500 MG: 500 CAPSULE ORAL at 08:53

## 2025-05-13 RX ADMIN — ENOXAPARIN SODIUM 40 MG: 100 INJECTION SUBCUTANEOUS at 08:53

## 2025-05-13 RX ADMIN — ASPIRIN 81 MG: 81 TABLET, COATED ORAL at 08:53

## 2025-05-13 RX ADMIN — OXYCODONE AND ACETAMINOPHEN 1 TABLET: 325; 5 TABLET ORAL at 12:40

## 2025-05-13 RX ADMIN — ACETAMINOPHEN 650 MG: 325 TABLET ORAL at 00:31

## 2025-05-13 RX ADMIN — OXYCODONE AND ACETAMINOPHEN 1 TABLET: 325; 5 TABLET ORAL at 19:29

## 2025-05-13 RX ADMIN — ACETAMINOPHEN 650 MG: 325 TABLET ORAL at 10:55

## 2025-05-13 RX ADMIN — SODIUM CHLORIDE, PRESERVATIVE FREE 10 ML: 5 INJECTION INTRAVENOUS at 21:24

## 2025-05-13 RX ADMIN — PANTOPRAZOLE SODIUM 40 MG: 40 TABLET, DELAYED RELEASE ORAL at 06:32

## 2025-05-13 ASSESSMENT — PAIN DESCRIPTION - ORIENTATION
ORIENTATION: LEFT

## 2025-05-13 ASSESSMENT — PAIN DESCRIPTION - ONSET
ONSET: ON-GOING

## 2025-05-13 ASSESSMENT — PAIN - FUNCTIONAL ASSESSMENT
PAIN_FUNCTIONAL_ASSESSMENT: PREVENTS OR INTERFERES SOME ACTIVE ACTIVITIES AND ADLS
PAIN_FUNCTIONAL_ASSESSMENT: PREVENTS OR INTERFERES WITH MANY ACTIVE NOT PASSIVE ACTIVITIES
PAIN_FUNCTIONAL_ASSESSMENT: PREVENTS OR INTERFERES WITH MANY ACTIVE NOT PASSIVE ACTIVITIES
PAIN_FUNCTIONAL_ASSESSMENT: PREVENTS OR INTERFERES SOME ACTIVE ACTIVITIES AND ADLS
PAIN_FUNCTIONAL_ASSESSMENT: PREVENTS OR INTERFERES WITH MANY ACTIVE NOT PASSIVE ACTIVITIES
PAIN_FUNCTIONAL_ASSESSMENT: PREVENTS OR INTERFERES SOME ACTIVE ACTIVITIES AND ADLS

## 2025-05-13 ASSESSMENT — PAIN DESCRIPTION - LOCATION
LOCATION: SHOULDER
LOCATION: ARM

## 2025-05-13 ASSESSMENT — PAIN SCALES - GENERAL
PAINLEVEL_OUTOF10: 9
PAINLEVEL_OUTOF10: 3
PAINLEVEL_OUTOF10: 3
PAINLEVEL_OUTOF10: 8
PAINLEVEL_OUTOF10: 8
PAINLEVEL_OUTOF10: 2
PAINLEVEL_OUTOF10: 4
PAINLEVEL_OUTOF10: 6
PAINLEVEL_OUTOF10: 8

## 2025-05-13 ASSESSMENT — ENCOUNTER SYMPTOMS
VOMITING: 0
SHORTNESS OF BREATH: 0
EYE DISCHARGE: 0
BACK PAIN: 0
SORE THROAT: 0
DIARRHEA: 0
ABDOMINAL DISTENTION: 0
COUGH: 0
NAUSEA: 0
EYE REDNESS: 0
EYE PAIN: 0
WHEEZING: 0
SINUS PRESSURE: 0

## 2025-05-13 ASSESSMENT — PAIN DESCRIPTION - DESCRIPTORS
DESCRIPTORS: THROBBING
DESCRIPTORS: NAGGING
DESCRIPTORS: ACHING;DISCOMFORT
DESCRIPTORS: ACHING;DISCOMFORT
DESCRIPTORS: ACHING;DISCOMFORT;SORE;TENDER
DESCRIPTORS: ACHING;DISCOMFORT

## 2025-05-13 ASSESSMENT — PAIN DESCRIPTION - FREQUENCY
FREQUENCY: CONTINUOUS

## 2025-05-13 ASSESSMENT — PAIN DESCRIPTION - PAIN TYPE
TYPE: ACUTE PAIN

## 2025-05-13 NOTE — DISCHARGE INSTR - COC
Continuity of Care Form    Patient Name: Marilu Marcos   :  1945  MRN:  [id number]    Admit date:  2025  Discharge date:  25    Code Status Order: Full Code   Advance Directives:     Admitting Physician:  Ivan Kaufman MD  PCP: Juan Carlos Carter DO    Discharging Nurse: SALOMON Andrade RN  Discharging Hospital Unit/Room#: 0522/0522-A  Discharging Unit Phone Number: [phone]    Emergency Contact:   Extended Emergency Contact Information  Primary Emergency Contact: PritiRoyce W \"Harjeet\"  Address: [address] [place] [address], [address]           [address]  Home Phone: [phone]  Work Phone: [phone]  Mobile Phone: [phone]  Relation: Child  Preferred language: English   needed? No  Secondary Emergency Contact: Gemma Hussein  Address: [address] [place] [address]  Home Phone: [phone]  Mobile Phone: [phone]  Relation: Child  Preferred language: English   needed? No    Past Surgical History:  Past Surgical History:   Procedure Laterality Date    ADENOIDECTOMY      CARPAL TUNNEL RELEASE Right     CERVICAL FUSION      COLON SURGERY      COLOSTOMY      EYE SURGERY Bilateral     CATARACT    HERNIA REPAIR      HIP FRACTURE SURGERY Left 9/3/2022    HIP OPEN REDUCTION INTERNAL FIXATION WITH CEPHALOMEDULLARY NAIL performed by Juan Carlos Altamirano MD at [facility] OR    JOINT REPLACEMENT      rt and lt    JOINT REPLACEMENT Bilateral     JOINT REPLACEMENT Right     TONSILLECTOMY      TUBAL LIGATION      UPPER GASTROINTESTINAL ENDOSCOPY N/A 4/3/2025    ESOPHAGOGASTRODUODENOSCOPY BIOPSY performed by Rosalio Mcadams MD at [facility] ENDOSCOPY       Immunization History:   Immunization History   Administered Date(s) Administered    COVID-19, MODERNA BLUE border, Primary or Immunocompromised, (age 12y+), IM, 100 mcg/0.5mL 2021, 2021    COVID-19, MODERNA Booster BLUE border, (age 18y+), IM, 50mcg/0.25mL  S42.92XA    Syncope R55       Isolation/Infection:   Isolation            No Isolation          Patient Infection Status    None to display              Nurse Assessment:  Last Vital Signs: BP (!) 123/58   Pulse 66   Temp 97.9 °F (36.6 °C) (Oral)   Resp 16   Ht 1.575 m (5' 2\")   Wt 59.9 kg (132 lb)   SpO2 99%   BMI 24.14 kg/m²     Last documented pain score (0-10 scale): Pain Level: 9  Last Weight:   Wt Readings from Last 1 Encounters:   05/12/25 59.9 kg (132 lb)     Mental Status:  oriented, alert, coherent, logical, thought processes intact, and able to concentrate and follow conversation    IV Access:  - None    Nursing Mobility/ADLs:  Walking   Assisted  Transfer  Assisted  Bathing  Assisted  Dressing  Assisted  Toileting  Assisted  Feeding  Independent  Med Admin  Independent  Med Delivery    Whole with pudding    Wound Care Documentation and Therapy:        Elimination:  Continence:   Bowel: Yes- Ileostomy  Bladder: Yes  Urinary Catheter: None   Colostomy/Ileostomy/Ileal Conduit: Yes  [REMOVED] Colostomy Umbilicus-Stomal Appliance: 1 piece  Ileostomy Ileostomy-Stomal Appliance: 1 piece, Flat, Clean, dry & intact  [REMOVED] Colostomy Umbilicus-Stoma  Assessment: Pink  Ileostomy Ileostomy-Stoma  Assessment: Unable to assess  Ileostomy Ileostomy-Mucocutaneous Junction:  (TETE)  [REMOVED] Colostomy Umbilicus-Peristomal Assessment: Clean, dry & intact  Ileostomy Ileostomy-Peristomal Assessment: Unable to assess  [REMOVED] Colostomy Umbilicus-Treatment: Pouch change  [REMOVED] Colostomy Umbilicus-Stool Appearance: Loose  Ileostomy Ileostomy-Stool Appearance:  (soft)  [REMOVED] Colostomy Umbilicus-Stool Color: Brown  Ileostomy Ileostomy-Stool Color:  (brown)  [REMOVED] Colostomy Umbilicus-Stool Amount: Small  Ileostomy Ileostomy-Stool Amount:  (small)  [REMOVED] Colostomy Umbilicus-Output (mL): 50 ml  Ileostomy Ileostomy-Output (mL): 200 ml    Date of Last BM: 5/13/25    Intake/Output Summary (Last 24 hours) at

## 2025-05-13 NOTE — CARE COORDINATION
Social Work discharge planning  Pt has chosen snf at [facility].SW called referral to [facility] now. Await their eval.  Electronically signed by JESUS Lo on 5/13/2025 at 9:05 AM     Addendum  Pt accepted to [facility] skilled today. Notified charge Rn. SAAD 7000 done. N17 ready in [place]. Transport forms in folder.  Electronically signed by JESUS Lo on 5/13/2025 at 1:34 PM     Addendum  Pt to [facility] skilled today between 630-830p via Physician's. Charge Rn, snf liaison, pt/son Harjeet notified.  Electronically signed by JESUS Lo on 5/13/2025 at 2:29 PM

## 2025-05-13 NOTE — PROGRESS NOTES
[facility] Hospitalist Progress Note    Admitting Date and Time: 5/9/2025 11:58 AM  Admit Dx: Hyponatremia [E87.1]  Shoulder fracture, left, closed, initial encounter [S42.92XA]  Syncope, unspecified syncope type [R55]    Subjective:  Patient is being followed for Hyponatremia [E87.1]  Shoulder fracture, left, closed, initial encounter [S42.92XA]  Syncope, unspecified syncope type [R55]     No acute events overnight.  Patient is awaiting placement for [facility] at SNF and will continue conservative management for her shoulder.      ROS: denies fever, chills, cp, sob, n/v, HA unless stated above.      enoxaparin  40 mg SubCUTAneous Daily    aspirin  81 mg Oral Daily    folic acid  1 mg Oral Daily    hydroxyurea  500 mg Oral QAM    metoprolol tartrate  50 mg Oral Daily    pantoprazole  40 mg Oral QAM AC    atorvastatin  40 mg Oral Daily    sodium chloride flush  5-40 mL IntraVENous 2 times per day    insulin lispro  0-4 Units SubCUTAneous 4x Daily AC & HS     melatonin, 6 mg, Nightly PRN  oxyCODONE-acetaminophen, 1 tablet, Q4H PRN  LORazepam, 0.5 mg, Q6H PRN  sodium chloride flush, 5-40 mL, PRN  sodium chloride, , PRN  potassium chloride, 40 mEq, PRN   Or  potassium alternative oral replacement, 40 mEq, PRN   Or  potassium chloride, 10 mEq, PRN  magnesium sulfate, 2,000 mg, PRN  ondansetron, 4 mg, Q8H PRN   Or  ondansetron, 4 mg, Q6H PRN  polyethylene glycol, 17 g, Daily PRN  acetaminophen, 650 mg, Q6H PRN   Or  acetaminophen, 650 mg, Q6H PRN  dicyclomine, 20 mg, TID PRN  glucose, 4 tablet, PRN  dextrose bolus, 125 mL, PRN   Or  dextrose bolus, 250 mL, PRN  glucagon (rDNA), 1 mg, PRN  dextrose, , Continuous PRN         Objective:    BP (!) 123/58   Pulse 66   Temp 97.9 °F (36.6 °C) (Oral)   Resp 16   Ht 1.575 m (5' 2\")   Wt 59.9 kg (132 lb)   SpO2 99%   BMI 24.14 kg/m²     General Appearance: alert and oriented to person, place and time and in no acute distress  Skin: warm and dry  Head: normocephalic and  Hyponatremia  Active Problems:    Shoulder fracture, left, closed, initial encounter    Syncope  Resolved Problems:    * No resolved hospital problems. *      Plan:    Syncope and collapse  - Check orthostatic vital signs  - Echocardiogram noted with normal LV systolic function with LVEF 65-70%.  - Monitor on telemetry    Left proximal humerus fracture  Fall at home  Cervical stenosis  Cervical fusion, C3-C6  Cervical spondylosis  -Nonweightbearing left upper extremity  - PT/OT  - Pain control  - Sling for comfort  - No orthopedic intervention of humerus fracture  - Patient will follow-up with spine surgery as outpatient upon discharge    Hyponatremia, resolved  - Sodium 126 on admission, repeated sodium is 139  - Monitor BMP    Chronic chron's disease without complication  -Stable  - Continue Bentyl    Thrombocytosis  - Platelet counts are stable  - Continue hydroxyurea     Chronic type 2 diabetes  - Patient is not on any medication at home, looks like diet controlled  - Continue sliding scale insulin with hypoglycemia protocol    Chronic CAD s/p PCI  - Continue aspirin, metoprolol and statin    VTE prophylaxis: Enoxaparin    Disposition: Patient is awaiting placement for [facility] at [facility]    NOTE: This report was transcribed using voice recognition software. Every effort was made to ensure accuracy; however, inadvertent computerized transcription errors may be present.  Electronically signed by Hebert Noel MD on 5/13/2025 at 10:41 AM

## 2025-05-13 NOTE — DISCHARGE SUMMARY
[facility] Hospitalist Physician Discharge Summary       [facility]  [address] [facility] [address]  [phone]          Activity level: As tolerated     Dispo: SNF    Condition on discharge: Stable     Patient ID:  Marilu Marcos  [id number]  80 y.o.  1945    Admit date: 5/9/2025    Discharge date and time:  5/13/2025  2:19 PM    Admission Diagnoses: Principal Problem:    Hyponatremia  Active Problems:    Shoulder fracture, left, closed, initial encounter    Syncope  Resolved Problems:    * No resolved hospital problems. *      Discharge Diagnoses: Principal Problem:    Hyponatremia  Active Problems:    Shoulder fracture, left, closed, initial encounter    Syncope  Resolved Problems:    * No resolved hospital problems. *      Consults:  IP CONSULT TO INTERNAL MEDICINE  IP CONSULT TO ORTHOPEDIC SURGERY  IP CONSULT TO ORTHOPEDIC SURGERY    Procedures: None    Hospital Course:   Patient Marilu Marcos is a 80 y.o. presented with Hyponatremia [E87.1]  Shoulder fracture, left, closed, initial encounter [S42.92XA]  Syncope, unspecified syncope type [R55]    A 60-year-old female was admitted for evaluation of syncope and collapse associated with a fall at home.  Upon further imaging/evaluation patient was found to have left proximal humerus fracture with cervical spondylosis.  Patient was evaluated by orthopedic and spine surgery who advised for conservative management with sling and pain control as well as physical therapy.  Patient also was managed for hyponatremia with improvement to her sodium level.  Patient was assessed by physical therapy and she is medically stable today for discharge for [facility] at [facility] and she need to follow-up with orthopedic and spine surgeon as outpatient upon discharge.    Discharge Exam:  General Appearance: alert and oriented to person, place and time and in no acute distress  Skin: warm and dry  Head: normocephalic and atraumatic  Eyes:

## 2025-05-13 NOTE — PLAN OF CARE
Problem: ABCDS Injury Assessment  Goal: Absence of physical injury  5/13/2025 0013 by Leslie Anderson  Outcome: Progressing  5/12/2025 1540 by Mandy Naraayn RN  Outcome: Progressing     Problem: Discharge Planning  Goal: Discharge to home or other facility with appropriate resources  5/13/2025 0013 by Leslie Anderson  Outcome: Progressing  5/12/2025 1540 by Mandy Narayan RN  Outcome: Progressing     Problem: Chronic Conditions and Co-morbidities  Goal: Patient's chronic conditions and co-morbidity symptoms are monitored and maintained or improved  5/13/2025 0013 by Leslie Anderson  Outcome: Progressing  5/12/2025 1540 by Mandy Narayan RN  Outcome: Progressing     Problem: Pain  Goal: Verbalizes/displays adequate comfort level or baseline comfort level  5/13/2025 0013 by Leslie Anderson  Outcome: Progressing  5/12/2025 1540 by Mandy Narayan RN  Outcome: Progressing     Problem: Safety - Adult  Goal: Free from fall injury  5/13/2025 0013 by Leslie Anderson  Outcome: Progressing  5/12/2025 1540 by Mandy Narayan RN  Outcome: Progressing     Problem: Skin/Tissue Integrity  Goal: Skin integrity remains intact  Description: 1.  Monitor for areas of redness and/or skin breakdown2.  Assess vascular access sites hourly3.  Every 4-6 hours minimum:  Change oxygen saturation probe site4.  Every 4-6 hours:  If on nasal continuous positive airway pressure, respiratory therapy assess nares and determine need for appliance change or resting period  5/13/2025 0013 by Leslie Anderson  Outcome: Progressing  Flowsheets (Taken 5/12/2025 2030)  Skin Integrity Remains Intact: Monitor for areas of redness and/or skin breakdown  5/12/2025 1540 by Mandy Narayan, RN  Outcome: Progressing  Flowsheets (Taken 5/12/2025 0856)  Skin Integrity Remains Intact: Monitor for areas of redness and/or skin breakdown

## 2025-05-13 NOTE — PATIENT CARE CONFERENCE
P Quality Flow/Interdisciplinary Rounds Progress Note        Quality Flow Rounds held on May 13, 2025    Disciplines Attending:  Bedside Nurse, , , and Nursing Unit Leadership    Marilu Marcos was admitted on 5/9/2025 11:58 AM    Anticipated Discharge Date:       Disposition:    Phoenix Score:  Phoenix Scale Score: 18    [facility] RISK OF UNPLANNED READMISSION 2.0             17.9 Total Score        Discussed patient goal for the day, patient clinical progression, and barriers to discharge.  The following Goal(s) of the Day/Commitment(s) have been identified:  dc planning      Kacy Montilla RN  May 13, 2025

## 2025-05-13 NOTE — PROGRESS NOTES
Nurse to nurse given to Kim at [facility], paperwork faxed to [phone] also at this time. Electronically signed by Nathalie Andrade RN on 5/13/2025 at 4:56 PM

## 2025-05-14 NOTE — CARE COORDINATION
SECURE email notification sent to identified IDT members at   [facility] and [facility]

## 2025-06-24 ENCOUNTER — CARE COORDINATION (OUTPATIENT)
Dept: CARE COORDINATION | Age: 80
End: 2025-06-24

## 2025-06-24 NOTE — CARE COORDINATION
ACM noted patient is in post acute coordination with RN with RN Leslie Robbins at this time.

## 2025-06-30 ENCOUNTER — OFFICE VISIT (OUTPATIENT)
Dept: ORTHOPEDIC SURGERY | Age: 80
End: 2025-06-30
Payer: MEDICARE

## 2025-06-30 VITALS
RESPIRATION RATE: 16 BRPM | OXYGEN SATURATION: 95 % | TEMPERATURE: 98 F | HEART RATE: 84 BPM | SYSTOLIC BLOOD PRESSURE: 134 MMHG | DIASTOLIC BLOOD PRESSURE: 62 MMHG

## 2025-06-30 DIAGNOSIS — S72.142A CLOSED INTERTROCHANTERIC FRACTURE, LEFT, INITIAL ENCOUNTER (HCC): ICD-10-CM

## 2025-06-30 DIAGNOSIS — S42.202A CLOSED FRACTURE OF PROXIMAL END OF LEFT HUMERUS, UNSPECIFIED FRACTURE MORPHOLOGY, INITIAL ENCOUNTER: Primary | ICD-10-CM

## 2025-06-30 PROCEDURE — 1159F MED LIST DOCD IN RCRD: CPT | Performed by: STUDENT IN AN ORGANIZED HEALTH CARE EDUCATION/TRAINING PROGRAM

## 2025-06-30 PROCEDURE — G8400 PT W/DXA NO RESULTS DOC: HCPCS | Performed by: STUDENT IN AN ORGANIZED HEALTH CARE EDUCATION/TRAINING PROGRAM

## 2025-06-30 PROCEDURE — 1126F AMNT PAIN NOTED NONE PRSNT: CPT | Performed by: STUDENT IN AN ORGANIZED HEALTH CARE EDUCATION/TRAINING PROGRAM

## 2025-06-30 PROCEDURE — 1123F ACP DISCUSS/DSCN MKR DOCD: CPT | Performed by: STUDENT IN AN ORGANIZED HEALTH CARE EDUCATION/TRAINING PROGRAM

## 2025-06-30 PROCEDURE — G8427 DOCREV CUR MEDS BY ELIG CLIN: HCPCS | Performed by: STUDENT IN AN ORGANIZED HEALTH CARE EDUCATION/TRAINING PROGRAM

## 2025-06-30 PROCEDURE — 1090F PRES/ABSN URINE INCON ASSESS: CPT | Performed by: STUDENT IN AN ORGANIZED HEALTH CARE EDUCATION/TRAINING PROGRAM

## 2025-06-30 PROCEDURE — 1036F TOBACCO NON-USER: CPT | Performed by: STUDENT IN AN ORGANIZED HEALTH CARE EDUCATION/TRAINING PROGRAM

## 2025-06-30 PROCEDURE — 3078F DIAST BP <80 MM HG: CPT | Performed by: STUDENT IN AN ORGANIZED HEALTH CARE EDUCATION/TRAINING PROGRAM

## 2025-06-30 PROCEDURE — 3075F SYST BP GE 130 - 139MM HG: CPT | Performed by: STUDENT IN AN ORGANIZED HEALTH CARE EDUCATION/TRAINING PROGRAM

## 2025-06-30 PROCEDURE — 99213 OFFICE O/P EST LOW 20 MIN: CPT | Performed by: STUDENT IN AN ORGANIZED HEALTH CARE EDUCATION/TRAINING PROGRAM

## 2025-06-30 PROCEDURE — G8420 CALC BMI NORM PARAMETERS: HCPCS | Performed by: STUDENT IN AN ORGANIZED HEALTH CARE EDUCATION/TRAINING PROGRAM

## 2025-06-30 RX ORDER — ONDANSETRON 4 MG/1
4 TABLET, FILM COATED ORAL EVERY 8 HOURS PRN
COMMUNITY

## 2025-06-30 RX ORDER — OXYCODONE AND ACETAMINOPHEN 5; 325 MG/1; MG/1
1 TABLET ORAL EVERY 4 HOURS PRN
COMMUNITY

## 2025-06-30 RX ORDER — FOLIC ACID 1 MG/1
1 TABLET ORAL DAILY
COMMUNITY

## 2025-06-30 NOTE — PROGRESS NOTES
[facility]  ORTHOPAEDICS    Follow Up Visit     CHIEF COMPLAINT:   Chief Complaint   Patient presents with    Follow-up     Lt Prox Humerus Fx        Marilu Marcos returns today for follow up visit regarding left proximal humerus fracture. Treatment thus far has included sling and PT.  She reports symptoms are improved.     History of Present Illness  The patient presents for evaluation of a fracture.    She reports an improvement in her condition, with a decrease in swelling. She has been able to move her elbow more than before, although not to its full range of motion. She experiences mild soreness in her elbow, which she attributes to occasional movements during sleep. She also notes that her other hand, which she has been using predominantly due to the injury, has developed an ache, but it is not painful. She expresses a desire to discontinue the use of the sling.    Physical Exam:     BP: 134/62    General: Alert and oriented x3, no acute distress  Cardiovascular/pulmonary: No labored breathing, peripheral perfusion intact  Musculoskeletal:    Physical Exam  Musculoskeletal:  LUE  Skin intact, much less bruising and ecchymosis compared to original examination  Compartment soft compressible  +2 radial pulse  Sensation tact light touch about median, radial, ulnar nerves  Positive and, PIN, ulnar nerve function  No tenderness with patient over distal humerus elbow forearm or hand  No longer any tenderness to palpation over the proximal humerus      Controlled Substances Monitoring:      Imaging:    Results  Imaging  X-rays reviewed today of the left shoulder Multi views demonstrating prior noted osteoarthritis and heterotopic ossification.  Proximal humerus fracture still present but unchanged in alignment from earlier studies.              Assesment/Plan:     Assessment & Plan  1.  Left proximal humerus fracture  The fracture is not fully healed yet, but there is early evidence of bone healing. Significant

## 2025-06-30 NOTE — PATIENT INSTRUCTIONS
INSTRUCTIONS FOR FACILITY      Weight Bearing: Non-weight bearing left upper extremity. Use assistive devices when needed.    Therapy: Continue PT/OT  Please follow Proximal Humerus/Daniel protocol at the 6 week shireen  ROM    Pain control: per facility physician      DVT Prophylaxis: Continue with per facility    Follow up: in 6 weeks      No future appointments.    Call office with any questions or concerns.

## 2025-07-10 ENCOUNTER — HOSPITAL ENCOUNTER (OUTPATIENT)
Age: 80
Discharge: HOME | End: 2025-07-12

## 2025-07-16 LAB — SURGICAL PATHOLOGY REPORT: NORMAL

## 2025-07-21 ENCOUNTER — OUTSIDE SERVICES (OUTPATIENT)
Dept: INTERNAL MEDICINE CLINIC | Age: 80
End: 2025-07-21

## 2025-07-21 ASSESSMENT — ENCOUNTER SYMPTOMS
EYES NEGATIVE: 1
RESPIRATORY NEGATIVE: 1
GASTROINTESTINAL NEGATIVE: 1
ALLERGIC/IMMUNOLOGIC NEGATIVE: 1

## 2025-07-21 NOTE — PROGRESS NOTES
[facility]        Marilu Marcos  : 1945  Visit Date: 2025  Facility:  [facility]      CC: Medical Reconcilation    History of Present Illness:      Hospital Course:  Marilu Marcos is a 80 y.o. female with a past medical history of Arthritis, crohn disease, ulcerative colitis, DM, hyperlipidemia, hypertension, insomnia, myelodysplastic disease. Patient was admitted to hospital for fall with left humerus fracture,and evaluated for potential syncopal episode.  She was discharged to [facility], transferred and admitted to [facility] on 24 for skilled rehabilitation.     Functional Status Prior to Admission: Limited     Interval History: Patient seen sitting up in chair. Patient is awake and alert. Denies chest pain or shortness of breath, endorses left shoulder pain, well controlled on current medication. Is concerned that family will want her to stay long term.     Advance Care Planning: Full Code     Lab Results   Component Value Date    WBC 6.0 2025    HGB 8.2 (L) 2025    HCT 28.4 (L) 2025    .5 (H) 2025     2025    LYMPHOPCT 13 (L) 2025    RBC 2.21 (L) 2025    MCH 37.1 (H) 2025    MCHC 28.9 (L) 2025    RDW 15.2 (H) 2025       Lab Results   Component Value Date     2025    K 4.4 2025     2025    CO2 24 2025    BUN 18 2025    CREATININE 0.7 2025    GLUCOSE 87 2025    CALCIUM 8.7 2025    BILITOT 0.9 2025    ALKPHOS 85 2025    AST 36 (H) 2025    ALT 17 2025    LABGLOM 83 2025    GFRAA >60 2022    AGRATIO 1.5 2025    GLOB 2.8 2025       Lab Results   Component Value Date    VITD25 42.3 2025    TSH 1.39 2025       Hemoglobin A1C   Date Value Ref Range Status   05/10/2025 4.8 4.0 - 5.6 % Final       Lab Results   Component Value Date    INR 1.2 2025    INR 1.1

## 2025-07-29 DIAGNOSIS — G47.9 SLEEP DISTURBANCE: Primary | ICD-10-CM

## 2025-07-29 RX ORDER — ZOLPIDEM TARTRATE 5 MG/1
5 TABLET ORAL NIGHTLY
Qty: 30 TABLET | Refills: 0 | Status: SHIPPED | OUTPATIENT
Start: 2025-07-29 | End: 2025-08-28

## 2025-08-01 DIAGNOSIS — G47.9 SLEEP DISTURBANCE: ICD-10-CM

## 2025-08-11 ENCOUNTER — OFFICE VISIT (OUTPATIENT)
Dept: ORTHOPEDIC SURGERY | Age: 80
End: 2025-08-11
Payer: MEDICARE

## 2025-08-11 VITALS
TEMPERATURE: 98.1 F | RESPIRATION RATE: 12 BRPM | SYSTOLIC BLOOD PRESSURE: 118 MMHG | DIASTOLIC BLOOD PRESSURE: 76 MMHG | OXYGEN SATURATION: 97 % | HEART RATE: 72 BPM

## 2025-08-11 DIAGNOSIS — S72.142A CLOSED INTERTROCHANTERIC FRACTURE, LEFT, INITIAL ENCOUNTER (HCC): ICD-10-CM

## 2025-08-11 DIAGNOSIS — S42.202A CLOSED FRACTURE OF PROXIMAL END OF LEFT HUMERUS, UNSPECIFIED FRACTURE MORPHOLOGY, INITIAL ENCOUNTER: Primary | ICD-10-CM

## 2025-08-11 PROCEDURE — 1125F AMNT PAIN NOTED PAIN PRSNT: CPT | Performed by: STUDENT IN AN ORGANIZED HEALTH CARE EDUCATION/TRAINING PROGRAM

## 2025-08-11 PROCEDURE — G8400 PT W/DXA NO RESULTS DOC: HCPCS | Performed by: STUDENT IN AN ORGANIZED HEALTH CARE EDUCATION/TRAINING PROGRAM

## 2025-08-11 PROCEDURE — 1090F PRES/ABSN URINE INCON ASSESS: CPT | Performed by: STUDENT IN AN ORGANIZED HEALTH CARE EDUCATION/TRAINING PROGRAM

## 2025-08-11 PROCEDURE — 1159F MED LIST DOCD IN RCRD: CPT | Performed by: STUDENT IN AN ORGANIZED HEALTH CARE EDUCATION/TRAINING PROGRAM

## 2025-08-11 PROCEDURE — G8420 CALC BMI NORM PARAMETERS: HCPCS | Performed by: STUDENT IN AN ORGANIZED HEALTH CARE EDUCATION/TRAINING PROGRAM

## 2025-08-11 PROCEDURE — 3078F DIAST BP <80 MM HG: CPT | Performed by: STUDENT IN AN ORGANIZED HEALTH CARE EDUCATION/TRAINING PROGRAM

## 2025-08-11 PROCEDURE — G8427 DOCREV CUR MEDS BY ELIG CLIN: HCPCS | Performed by: STUDENT IN AN ORGANIZED HEALTH CARE EDUCATION/TRAINING PROGRAM

## 2025-08-11 PROCEDURE — 1123F ACP DISCUSS/DSCN MKR DOCD: CPT | Performed by: STUDENT IN AN ORGANIZED HEALTH CARE EDUCATION/TRAINING PROGRAM

## 2025-08-11 PROCEDURE — 3074F SYST BP LT 130 MM HG: CPT | Performed by: STUDENT IN AN ORGANIZED HEALTH CARE EDUCATION/TRAINING PROGRAM

## 2025-08-11 PROCEDURE — 1036F TOBACCO NON-USER: CPT | Performed by: STUDENT IN AN ORGANIZED HEALTH CARE EDUCATION/TRAINING PROGRAM

## 2025-08-11 PROCEDURE — 99213 OFFICE O/P EST LOW 20 MIN: CPT | Performed by: STUDENT IN AN ORGANIZED HEALTH CARE EDUCATION/TRAINING PROGRAM

## 2025-08-11 RX ORDER — BISACODYL 10 MG
10 SUPPOSITORY, RECTAL RECTAL DAILY
COMMUNITY

## 2025-08-11 RX ORDER — ACETAMINOPHEN 325 MG/1
650 TABLET ORAL EVERY 6 HOURS PRN
COMMUNITY

## 2025-08-11 RX ORDER — MIRTAZAPINE 15 MG/1
15 TABLET, FILM COATED ORAL NIGHTLY
COMMUNITY

## 2025-08-11 RX ORDER — ZOLPIDEM TARTRATE 5 MG/1
5 TABLET ORAL NIGHTLY PRN
COMMUNITY

## 2025-08-11 RX ORDER — LORAZEPAM 0.5 MG/1
0.5 TABLET ORAL EVERY 6 HOURS PRN
COMMUNITY

## 2025-08-20 ENCOUNTER — OUTSIDE SERVICES (OUTPATIENT)
Dept: INTERNAL MEDICINE CLINIC | Age: 80
End: 2025-08-20

## 2025-09-04 ASSESSMENT — ENCOUNTER SYMPTOMS
EYES NEGATIVE: 1
ALLERGIC/IMMUNOLOGIC NEGATIVE: 1
GASTROINTESTINAL NEGATIVE: 1
RESPIRATORY NEGATIVE: 1

## (undated) DEVICE — PADDING UNDERCAST W6INXL4YD WYTEX 6 PER BG

## (undated) DEVICE — DRAPE ISOLATN PT ST W/POCKET

## (undated) DEVICE — TOWEL,OR,DSP,ST,BLUE,STD,6/PK,12PK/CS: Brand: MEDLINE

## (undated) DEVICE — Device: Brand: PROTECTORS, LEG SPAR BALL JOINT, 12/PR

## (undated) DEVICE — GLOVE SURG SZ 8 L12IN FNGR THK94MIL STD WHT LTX FREE

## (undated) DEVICE — 4-PORT MANIFOLD: Brand: NEPTUNE 2

## (undated) DEVICE — COVER HNDL LT DISP

## (undated) DEVICE — DRESSING FOAM ADH POLYUR W/ SIL WND SHT 4IN LEN 4IN W

## (undated) DEVICE — 3M™ COBAN™ NL STERILE NON-LATEX SELF-ADHERENT WRAP, 2084S, 4 IN X 5 YD (10 CM X 4,5 M), 18 ROLLS/CASE: Brand: 3M™ COBAN™

## (undated) DEVICE — GLOVE SURG SZ 8 CRM LTX FREE POLYISOPRENE POLYMER BEAD ANTI

## (undated) DEVICE — Device

## (undated) DEVICE — GUIDEWIRE ORTH L400MM DIA3.2MM FOR TFN

## (undated) DEVICE — SOLUTION IV IRRIG POUR BRL 0.9% SODIUM CHL 2F7124

## (undated) DEVICE — 1000 S-DRAPE TOWEL DRAPE 10/BX: Brand: STERI-DRAPE™

## (undated) DEVICE — INTENDED FOR TISSUE SEPARATION, AND OTHER PROCEDURES THAT REQUIRE A SHARP SURGICAL BLADE TO PUNCTURE OR CUT.: Brand: BARD-PARKER ® STAINLESS STEEL BLADES

## (undated) DEVICE — BNDG,ELSTC,MATRIX,STRL,6"X5YD,LF,HOOK&LP: Brand: MEDLINE

## (undated) DEVICE — ELECTRODE PT RET AD L9FT HI MOIST COND ADH HYDRGEL CORDED

## (undated) DEVICE — GAUZE,SPONGE,4"X4",8PLY,STRL,LF,10/TRAY: Brand: MEDLINE

## (undated) DEVICE — Device: Brand: COVER, PERINEAL POST, 12 PK

## (undated) DEVICE — DRESSING HYDROFIBER AQUACEL AG ADVANTAGE 3.5X6 IN

## (undated) DEVICE — SPONGE GZ W4XL4IN RAYON POLY CVR W/NONWOVEN FAB STRL 2/PK

## (undated) DEVICE — GOWN,SIRUS,FABRNF,XL,20/CS: Brand: MEDLINE

## (undated) DEVICE — PACK PROCEDURE SURG GEN CUST

## (undated) DEVICE — STERILE POLYISOPRENE POWDER-FREE SURGICAL GLOVES: Brand: PROTEXIS

## (undated) DEVICE — SYRINGE 20ML LL S/C 50

## (undated) DEVICE — SOLUTION IV IRRIG WATER 1000ML POUR BRL 2F7114

## (undated) DEVICE — BIT DRL L500MM DIA6X9MM CANN STP L QUIK CPL FOR DH DC TFN

## (undated) DEVICE — DOUBLE BASIN SET: Brand: MEDLINE INDUSTRIES, INC.

## (undated) DEVICE — SPONGE LAP W18XL18IN WHT COT 4 PLY FLD STRUNG RADPQ DISP ST

## (undated) DEVICE — TUBING, SUCTION, 9/32" X 10', STRAIGHT: Brand: MEDLINE

## (undated) DEVICE — TAPE ADH W3INXL10YD WHT COT WVN BK POWERFUL RUB BASE HIGHLY

## (undated) DEVICE — BIT DRL L L266MM DIA16MM FEM FLX CANN QUIK CPL

## (undated) DEVICE — FORCEPS BX OVL CUP FEN DISPOSABLE CAP L 160CM RAD JAW 4

## (undated) DEVICE — GOWN,SIRUS,POLYRNF,BRTHSLV,XL,30/CS: Brand: MEDLINE

## (undated) DEVICE — BLOCK BITE 60FR RUBBER ADLT DENTAL

## (undated) DEVICE — CHLORAPREP 26ML ORANGE

## (undated) DEVICE — 3M™ STERI-DRAPE™ U-DRAPE 1015: Brand: STERI-DRAPE™

## (undated) DEVICE — DRAPE,REIN 53X77,STERILE: Brand: MEDLINE

## (undated) DEVICE — NEEDLE FLTR 18GA L1.5IN MEM THK5UM BLNT DISP

## (undated) DEVICE — C-ARM: Brand: UNBRANDED

## (undated) DEVICE — GLOVE ORTHO 8   MSG9480

## (undated) DEVICE — GRADUATE TRIANG MEASURE 1000ML BLK PRNT

## (undated) DEVICE — BIT DRL L330MM DIA4.2MM CALIB L100MM ST 3 FLUT QUIK CPL FOR

## (undated) DEVICE — BNDG,ELSTC,MATRIX,STRL,4"X5YD,LF,HOOK&LP: Brand: MEDLINE